# Patient Record
Sex: FEMALE | Race: BLACK OR AFRICAN AMERICAN | Employment: FULL TIME | URBAN - METROPOLITAN AREA
[De-identification: names, ages, dates, MRNs, and addresses within clinical notes are randomized per-mention and may not be internally consistent; named-entity substitution may affect disease eponyms.]

---

## 2017-02-10 DIAGNOSIS — F98.8 ADD (ATTENTION DEFICIT DISORDER): ICD-10-CM

## 2017-02-14 RX ORDER — DEXTROAMPHETAMINE SACCHARATE, AMPHETAMINE ASPARTATE, DEXTROAMPHETAMINE SULFATE AND AMPHETAMINE SULFATE 2.5; 2.5; 2.5; 2.5 MG/1; MG/1; MG/1; MG/1
TABLET ORAL
Qty: 60 TAB | Refills: 0 | Status: SHIPPED | OUTPATIENT
Start: 2017-02-14 | End: 2017-04-06 | Stop reason: SDUPTHER

## 2017-02-15 ENCOUNTER — TELEPHONE (OUTPATIENT)
Dept: FAMILY MEDICINE CLINIC | Age: 42
End: 2017-02-15

## 2017-02-15 NOTE — TELEPHONE ENCOUNTER
Called patient at [phone] (home)  (non-secure line) and unable to leave a message due to caller was unavailable at this time as per Guzman Araya. Patient needs to be informed that medication order Adderall is ready for .

## 2017-04-06 ENCOUNTER — OFFICE VISIT (OUTPATIENT)
Dept: FAMILY MEDICINE CLINIC | Age: 42
End: 2017-04-06

## 2017-04-06 VITALS
SYSTOLIC BLOOD PRESSURE: 140 MMHG | TEMPERATURE: 98.3 F | DIASTOLIC BLOOD PRESSURE: 80 MMHG | OXYGEN SATURATION: 98 % | HEART RATE: 110 BPM | RESPIRATION RATE: 16 BRPM | WEIGHT: 217 LBS | HEIGHT: 66 IN | BODY MASS INDEX: 34.87 KG/M2

## 2017-04-06 DIAGNOSIS — F98.8 ADD (ATTENTION DEFICIT DISORDER): ICD-10-CM

## 2017-04-06 DIAGNOSIS — E11.9 DIABETES MELLITUS WITHOUT COMPLICATION (HCC): ICD-10-CM

## 2017-04-06 DIAGNOSIS — F90.0 ATTENTION DEFICIT HYPERACTIVITY DISORDER (ADHD), PREDOMINANTLY INATTENTIVE TYPE: ICD-10-CM

## 2017-04-06 DIAGNOSIS — E11.9 DIABETES MELLITUS WITHOUT COMPLICATION (HCC): Primary | ICD-10-CM

## 2017-04-06 RX ORDER — SIMVASTATIN 10 MG/1
10 TABLET, FILM COATED ORAL
Qty: 30 TAB | Refills: 6 | Status: SHIPPED | OUTPATIENT
Start: 2017-04-06 | End: 2018-01-22 | Stop reason: SDUPTHER

## 2017-04-06 RX ORDER — DEXTROAMPHETAMINE SACCHARATE, AMPHETAMINE ASPARTATE, DEXTROAMPHETAMINE SULFATE AND AMPHETAMINE SULFATE 5; 5; 5; 5 MG/1; MG/1; MG/1; MG/1
TABLET ORAL
Qty: 60 TAB | Refills: 0 | Status: SHIPPED | OUTPATIENT
Start: 2017-04-06 | End: 2017-08-09 | Stop reason: SDUPTHER

## 2017-04-06 NOTE — PATIENT INSTRUCTIONS
Attention Deficit Hyperactivity Disorder (ADHD) in Adults: Care Instructions  Your Care Instructions  Attention deficit hyperactivity disorder, or ADHD, is a condition that makes it hard to pay attention. So you may have problems when you try to focus, get organized, and finish tasks. It might make you more active than other people. Or you might do things without thinking first.  ADHD is very common. It usually starts in early childhood. Many adults don't realize they have it until their children are diagnosed. Then they become aware of their own symptoms. Doctors don't know what causes ADHD. But it often runs in families. ADHD can be treated with medicines, behavior training, and counseling. Treatment can improve your life. Follow-up care is a key part of your treatment and safety. Be sure to make and go to all appointments, and call your doctor if you are having problems. It's also a good idea to know your test results and keep a list of the medicines you take. How can you care for yourself at home? · Learn all you can about ADHD. This will help you and your family understand it better. · Take your medicines exactly as prescribed. Call your doctor if you think you are having a problem with your medicine. You will get more details on the specific medicines your doctor prescribes. · If you miss a dose of your medicine, do not take an extra dose. · If your doctor suggests counseling, find a counselor you like and trust. Talk openly and honestly. Be willing to make some changes. · Find a support group for adults with ADHD. Talking to others with the same problems can help you feel better. It can also give you ideas about how to best cope with the condition. · Get rid of distractions at your work space. Keep your desk clean. Try not to face a window or busy hallway. · Use files, planners, and other tools to keep you organized. · Limit use of alcohol, and do not use illegal drugs.  People with ADHD tend to become addicted more easily than others. Tell your doctor if you need help to quit. Counseling, support groups, and sometimes medicines can help you stay free of alcohol or drugs. · Get at least 30 minutes of physical activity on most days of the week. Exercise has been shown to help people cope with ADHD. Walking is a good choice. You also may want to do other activities, such as running, swimming, cycling, or playing tennis or team sports. When should you call for help? Watch closely for changes in your health, and be sure to contact your doctor if:  · You feel sad a lot or cry all the time. · You have trouble sleeping, or you sleep too much. · You find it hard to concentrate, make decisions, or remember things. · You change how you normally eat. · You feel guilty for no reason. Where can you learn more? Go to http://jakob-niko.info/. Enter B196 in the search box to learn more about \"Attention Deficit Hyperactivity Disorder (ADHD) in Adults: Care Instructions. \"  Current as of: July 26, 2016  Content Version: 11.2  © 1438-9185 [employer]. Care instructions adapted under license by [employer] (which disclaims liability or warranty for this information). If you have questions about a medical condition or this instruction, always ask your healthcare professional. [address] any warranty or liability for your use of this information.

## 2017-04-06 NOTE — PROGRESS NOTES
1. Have you been to the ER, urgent care clinic since your last visit? Hospitalized since your last visit? No    2. Have you seen or consulted any other health care providers outside of the [facility] since your last visit? Include any pap smears or colon screening.  Yes Where: [facility] - gynecology

## 2017-04-06 NOTE — PROGRESS NOTES
HISTORY OF PRESENT ILLNESS  Tarah Coombs is a 37 y.o. female. HPI  Patient is here today for evaluation and treatment of: Diabetes    Diabetes:   States that recently she has had some elevated blood sugars; She inquires about if crestor could be the cause. She stopped the crestor and her blood sugars in the a.m. improved; She  She has been out her [place] [facility] [facility] [place] fro the last two weeks. She needs a refill. She also needs to see ophthalmology and needs  Diabetic foot exam and urine microalbumin. Plans to start exercising. She continues on glipizide. She thinks her adderall is not as effective as before; She has difficulty focussing and concentrating. Reports being at work and having difficulty concentrating at work. PMH,  Meds, Allergies, Family History, Social history reviewed        Current Outpatient Prescriptions:     dextroamphetamine-amphetamine (ADDERALL) 10 mg tablet, Earliest Fill Date: 2/14/17. Take 1 tab every morning. May take 1 tab between 12noon-3pm, Disp: 60 Tab, Rfl: 0    glipiZIDE (GLUCOTROL) 10 mg tablet, TAKE 1 TABLET BY MOUTH TWICE DAILY, Disp: 60 Tab, Rfl: 6    ergocalciferol (VITAMIN D2) 50,000 unit capsule, Take 1 Cap by mouth every seven (7) days. , Disp: 12 Cap, Rfl: 1    losartan (COZAAR) 50 mg tablet, Take 1 Tab by mouth daily. , Disp: 30 Tab, Rfl: 6    sitaGLIPtin (JANUVIA) 100 mg tablet, Take 1 Tab by mouth daily. , Disp: 30 Tab, Rfl: 5    Lancets (ONE TOUCH DELICA) misc, To use with one touch mini delica device. Check glucose 3 times daily, Disp: 1 Package, Rfl: 11    hydrochlorothiazide (HYDRODIURIL) 25 mg tablet, Take 1 Tab by mouth daily as needed. , Disp: 90 Tab, Rfl: 3    butalbital-acetaminophen-caffeine (FIORICET, ESGIC) -40 mg per tablet, 1-2 tabs every 4-6 hours as needed for headaches not to exceed 6 tabs in 24 hours, Disp: 60 Tab, Rfl: 0    glucose blood VI test strips (BLOOD GLUCOSE TEST) strip, To use with freestyle insulinx meter, Disp: 100 Strip, Rfl: 11    rosuvastatin (CRESTOR) 10 mg tablet, Take 1 Tab by mouth nightly., Disp: 30 Tab, Rfl: 5        Review of Systems   Constitutional: Negative for chills and fever. Cardiovascular: Negative for chest pain, claudication and leg swelling. Genitourinary: Negative for dysuria and urgency. Physical Exam   Visit Vitals    /80    Pulse (!) 110    Temp 98.3 °F (36.8 °C) (Oral)    Resp 16    Ht 5' 6\" (1.676 m)    Wt 217 lb (98.4 kg)    LMP 01/01/2007    SpO2 98%    BMI 35.02 kg/m2   General appearance: alert, cooperative, no distress, appears stated age  Neck: supple, symmetrical, trachea midline, no adenopathy, thyroid: not enlarged, symmetric, no tenderness/mass/nodules, no carotid bruit and no JVD  Lungs: clear to auscultation bilaterally  Heart: regular rate and rhythm, S1, S2 normal, no murmur, click, rub or gallop  Abdomen: soft, non-tender. Bowel sounds normal. No masses,  no organomegaly  Extremities: extremities normal, atraumatic, no cyanosis or edema   Sensory exam of the foot is normal, tested with the monofilament. Good pulses, no lesions or ulcers, good peripheral pulses. Lab Results   Component Value Date/Time    Cholesterol, total 238 12/14/2016 12:00 PM    HDL Cholesterol 41 12/14/2016 12:00 PM    LDL, calculated 163 12/14/2016 12:00 PM    VLDL, calculated 34 12/14/2016 12:00 PM    Triglyceride 171 12/14/2016 12:00 PM    CHOL/HDL Ratio 5.3 01/21/2016 09:00 AM     Lab Results   Component Value Date/Time    Hemoglobin A1c 12.4 12/14/2016 12:00 PM         ASSESSMENT and PLAN    ICD-10-CM ICD-9-CM    1. Diabetes mellitus without complication ([facility] [address]- not controlled E11.9 250.00 HEMOGLOBIN A1C WITH EAG      LIPID PANEL      METABOLIC PANEL, BASIC       DIABETES FOOT EXAM      MICROALBUMIN, UR, RAND W/ MICROALBUMIN/CREA RATIO      REFERRAL TO OPHTHALMOLOGY   2. Attention deficit hyperactivity disorder (ADHD), predominantly inattentive type- not controlled F90.0 314.00    3. ADD (attention deficit disorder) F98.8 314.00 dextroamphetamine-amphetamine (ADDERALL) 20 mg tablet     As above,    treatment plan as listed below  Orders Placed This Encounter    HEMOGLOBIN A1C WITH EAG    LIPID PANEL    METABOLIC PANEL, BASIC    MICROALBUMIN, UR, RAND W/ MICROALBUMIN/CREA RATIO    REFERRAL TO OPHTHALMOLOGY    HM DIABETES FOOT EXAM    SITagliptin (JANUVIA) 100 mg tablet    dextroamphetamine-amphetamine (ADDERALL) 20 mg tablet    simvastatin (ZOCOR) 10 mg tablet       above all stable unless otherwise noted  Increase adderall to 20 mg daily; may take another in afternoon as ordered  Refilled [employer]  Labs as ordered  Continue current meds as ordered  Follow-up Disposition:  Return in about 3 months (around 7/6/2017) for dm/adhd. An After Visit Summary was printed and given to the patient. This has been fully explained to the patient, who indicates understanding.

## 2017-04-06 NOTE — MR AVS SNAPSHOT
Visit Information Date & Time Provider Department Dept. Phone Encounter #  
 4/6/2017  6:00 PM Anjana Hoskins [facility] [phone] [id number] Follow-up Instructions Return in about 3 months (around 7/6/2017) for dm/adhd. Upcoming Health Maintenance Date Due  
 PAP AKA CERVICAL CYTOLOGY 7/29/2017 EYE EXAM RETINAL OR DILATED Q1 5/25/2017* HEMOGLOBIN A1C Q6M 6/14/2017 LIPID PANEL Q1 12/14/2017 FOOT EXAM Q1 4/6/2018 MICROALBUMIN Q1 4/6/2018 DTaP/Tdap/Td series (2 - Td) 1/1/2022 *Topic was postponed. The date shown is not the original due date. Allergies as of 4/6/2017  Review Complete On: 4/6/2017 By: Adan Rush LPN Severity Noted Reaction Type Reactions Lisinopril  12/29/2016    Cough Current Immunizations  Never Reviewed Name Date Influenza Vaccine PF 10/14/2015 TB Skin Test (PPD) Intradermal 12/14/2016  9:05 AM, 9/30/2015, 9/22/2015, 3/11/2013 Not reviewed this visit You Were Diagnosed With   
  
 Codes Comments [facility]    -  Primary ICD-10-CM: E11.9 ICD-9-CM: 250.00 Attention deficit hyperactivity disorder (ADHD), predominantly inattentive type     ICD-10-CM: F90.0 ICD-9-CM: 314.00   
 ADD (attention deficit disorder)     ICD-10-CM: F98.8 ICD-9-CM: 314.00 Vitals BP Pulse Temp Resp Height(growth percentile) Weight(growth percentile) 140/80 (!) 110 98.3 °F (36.8 °C) (Oral) 16 5' 6\" (1.676 m) 217 lb (98.4 kg) LMP SpO2 BMI OB Status Smoking Status 01/01/2007 98% 35.02 kg/m2 Hysterectomy Former Smoker Vitals History BMI and BSA Data Body Mass Index Body Surface Area 35.02 kg/m 2 2.14 m 2 Preferred Pharmacy Pharmacy Name Phone [address], [address] [facility] [phone] Your Updated Medication List  
  
   
 This list is accurate as of: 4/6/17  6:42 PM.  Always use your most recent med list.  
  
  
  
  
 butalbital-acetaminophen-caffeine -40 mg per tablet Commonly known as:  FIORICET, ESGIC  
1-2 tabs every 4-6 hours as needed for headaches not to exceed 6 tabs in 24 hours  
  
 dextroamphetamine-amphetamine 20 mg tablet Commonly known as:  ADDERALL Take 1 tab every morning. May take 1 tab between 12noon-3pm  
  
 ergocalciferol 50,000 unit capsule Commonly known as:  VITAMIN D2 Take 1 Cap by mouth every seven (7) days. glipiZIDE 10 mg tablet Commonly known as:  GLUCOTROL  
TAKE 1 TABLET BY MOUTH TWICE DAILY  
  
 glucose blood VI test strips strip Commonly known as:  blood glucose test  
To use with freestyle insulinx meter  
  
 hydroCHLOROthiazide 25 mg tablet Commonly known as:  HYDRODIURIL Take 1 Tab by mouth daily as needed. Lancets Misc Commonly known as: One Touch Lorelee Waylon To use with one touch mini delica device. Check glucose 3 times daily  
  
 losartan 50 mg tablet Commonly known as:  COZAAR Take 1 Tab by mouth daily. simvastatin 10 mg tablet Commonly known as:  ZOCOR Take 1 Tab by mouth nightly. SITagliptin 100 mg tablet Commonly known as:  Chemo Haggis Take 1 Tab by mouth daily. Prescriptions Printed Refills  
 dextroamphetamine-amphetamine (ADDERALL) 20 mg tablet 0 Sig: Take 1 tab every morning. May take 1 tab between 12noon-3pm  
 Class: Print Prescriptions Sent to Pharmacy Refills SITagliptin (JANUVIA) 100 mg tablet 6 Sig: Take 1 Tab by mouth daily. Class: Normal  
 Pharmacy: [address] [place] [address] #: [phone] Route: Oral  
 simvastatin (ZOCOR) 10 mg tablet 6 Sig: Take 1 Tab by mouth nightly.   
 Class: Normal  
 Pharmacy: [place] [address] [address] [facility]  #: [phone] Route: Oral  
  
We Performed the Following  DIABETES FOOT EXAM [HM7 Custom] REFERRAL TO OPHTHALMOLOGY [REF57 Custom] Comments:  
 Please evaluate patient for diabetic eye exam  
  
Follow-up Instructions Return in about 3 months (around 7/6/2017) for dm/adhd. To-Do List   
 04/06/2017 Lab:  HEMOGLOBIN A1C WITH EAG   
  
 04/06/2017 Lab:  LIPID PANEL   
  
 04/06/2017 Lab:  METABOLIC PANEL, BASIC   
  
 04/06/2017 Lab:  MICROALBUMIN, UR, RAND W/ MICROALBUMIN/CREA RATIO Referral Information Referral ID Referred By Referred To  
  
 [id number] [address], [address] Phone: [phone] Fax: [phone] Visits Status Start Date End Date 1 New Request 4/6/17 4/6/18 If your referral has a status of pending review or denied, additional information will be sent to support the outcome of this decision. Patient Instructions Attention Deficit Hyperactivity Disorder (ADHD) in Adults: Care Instructions Your Care Instructions Attention deficit hyperactivity disorder, or ADHD, is a condition that makes it hard to pay attention. So you may have problems when you try to focus, get organized, and finish tasks. It might make you more active than other people. Or you might do things without thinking first. 
ADHD is very common. It usually starts in early childhood. Many adults don't realize they have it until their children are diagnosed. Then they become aware of their own symptoms. Doctors don't know what causes ADHD. But it often runs in families. ADHD can be treated with medicines, behavior training, and counseling. Treatment can improve your life. Follow-up care is a key part of your treatment and safety.  Be sure to make and go to all appointments, and call your doctor if you are having problems. It's also a good idea to know your test results and keep a list of the medicines you take. How can you care for yourself at home? · Learn all you can about ADHD. This will help you and your family understand it better. · Take your medicines exactly as prescribed. Call your doctor if you think you are having a problem with your medicine. You will get more details on the specific medicines your doctor prescribes. · If you miss a dose of your medicine, do not take an extra dose. · If your doctor suggests counseling, find a counselor you like and trust. Talk openly and honestly. Be willing to make some changes. · Find a support group for adults with ADHD. Talking to others with the same problems can help you feel better. It can also give you ideas about how to best cope with the condition. · Get rid of distractions at your work space. Keep your desk clean. Try not to face a window or busy hallway. · Use files, planners, and other tools to keep you organized. · Limit use of alcohol, and do not use illegal drugs. People with ADHD tend to become addicted more easily than others. Tell your doctor if you need help to quit. Counseling, support groups, and sometimes medicines can help you stay free of alcohol or drugs. · Get at least 30 minutes of physical activity on most days of the week. Exercise has been shown to help people cope with ADHD. Walking is a good choice. You also may want to do other activities, such as running, swimming, cycling, or playing tennis or team sports. When should you call for help? Watch closely for changes in your health, and be sure to contact your doctor if: 
· You feel sad a lot or cry all the time. · You have trouble sleeping, or you sleep too much. · You find it hard to concentrate, make decisions, or remember things. · You change how you normally eat. · You feel guilty for no reason. Where can you learn more? Go to http://jakob-niko.info/. Enter B196 in the search box to learn more about \"Attention Deficit Hyperactivity Disorder (ADHD) in Adults: Care Instructions. \" Current as of: July 26, 2016 Content Version: 11.2 © 0645-1510 [employer]. Care instructions adapted under license by [employer] (which disclaims liability or warranty for this information). If you have questions about a medical condition or this instruction, always ask your healthcare professional. [place] [address] any warranty or liability for your use of this information. Introducing [address]! Dear Rosario Gunderson: Thank you for requesting a Zaplox account. Our records indicate that you already have an active Zaplox account. You can access your account anytime at [email or web address] Did you know that you can access your hospital and ER discharge instructions at any time in Zaplox? You can also review all of your test results from your hospital stay or ER visit. Additional Information If you have questions, please visit the Frequently Asked Questions section of the Zaplox website at [email or web address] Remember, Zaplox is NOT to be used for urgent needs. For medical emergencies, dial 911. Now available from your iPhone and Android! Please provide this summary of care documentation to your next provider. Your primary care clinician is listed as [address]. If you have any questions after today's visit, please call [phone].

## 2017-04-08 LAB
CREATININE, URINE: 42 MG/DL
MICROALB/CREAT RATIO, 140286: 2294.3 MCG/MG OF CREATININE (ref 0–30)
MICROALBUMIN,URINE RANDOM 140054: 963.6 UG/ML (ref 0.1–17)

## 2017-04-15 ENCOUNTER — HOSPITAL ENCOUNTER (OUTPATIENT)
Dept: LAB | Age: 42
Discharge: HOME | End: 2017-04-15

## 2017-04-15 LAB
ANION GAP SERPL CALC-SCNC: 15 MMOL/L
AVG GLU, 10930: 315 MG/DL (ref 91–123)
BUN SERPL-MCNC: 13 MG/DL (ref 6–22)
CALCIUM SERPL-MCNC: 9.5 MG/DL (ref 8.4–10.5)
CHLORIDE SERPL-SCNC: 100 MMOL/L (ref 98–110)
CHOLEST SERPL-MCNC: 219 MG/DL (ref 110–200)
CO2 SERPL-SCNC: 24 MMOL/L (ref 20–32)
CREAT SERPL-MCNC: 0.4 MG/DL (ref 0.5–1.2)
GFRAA, 66117: >60
GFRNA, 66118: >60
GLUCOSE SERPL-MCNC: 200 MG/DL (ref 65–99)
HBA1C MFR BLD HPLC: 12.6 % (ref 4.8–5.9)
HDLC SERPL-MCNC: 37 MG/DL (ref 40–59)
LDLC SERPL CALC-MCNC: 155 MG/DL (ref 50–99)
POTASSIUM SERPL-SCNC: 4.4 MMOL/L (ref 3.5–5.5)
SENTARA SPECIMEN COL,SENBCF: NORMAL
SODIUM SERPL-SCNC: 139 MMOL/L (ref 133–145)
TRIGL SERPL-MCNC: 138 MG/DL (ref 40–149)
VLDLC SERPL CALC-MCNC: 28 MG/DL (ref 8–30)

## 2017-04-15 PROCEDURE — 99001 SPECIMEN HANDLING PT-LAB: CPT | Performed by: FAMILY MEDICINE

## 2017-04-20 ENCOUNTER — OFFICE VISIT (OUTPATIENT)
Dept: FAMILY MEDICINE CLINIC | Age: 42
End: 2017-04-20

## 2017-04-20 VITALS
TEMPERATURE: 98.6 F | OXYGEN SATURATION: 98 % | HEART RATE: 92 BPM | WEIGHT: 217 LBS | RESPIRATION RATE: 16 BRPM | HEIGHT: 66 IN | SYSTOLIC BLOOD PRESSURE: 130 MMHG | DIASTOLIC BLOOD PRESSURE: 82 MMHG | BODY MASS INDEX: 34.87 KG/M2

## 2017-04-20 DIAGNOSIS — E11.9 DIABETES MELLITUS WITHOUT COMPLICATION (HCC): Primary | ICD-10-CM

## 2017-04-20 DIAGNOSIS — G43.909 MIGRAINE WITHOUT STATUS MIGRAINOSUS, NOT INTRACTABLE, UNSPECIFIED MIGRAINE TYPE: ICD-10-CM

## 2017-04-20 RX ORDER — LANCETS
EACH MISCELLANEOUS
Qty: 200 EACH | Refills: 11 | Status: SHIPPED | OUTPATIENT
Start: 2017-04-20 | End: 2018-12-11 | Stop reason: SDUPTHER

## 2017-04-20 RX ORDER — INSULIN DEGLUDEC 200 U/ML
10 INJECTION, SOLUTION SUBCUTANEOUS DAILY
Qty: 1 ADJUSTABLE DOSE PRE-FILLED PEN SYRINGE | Refills: 3 | Status: SHIPPED | OUTPATIENT
Start: 2017-04-20 | End: 2019-02-28 | Stop reason: SDUPTHER

## 2017-04-20 RX ORDER — BUTALBITAL, ACETAMINOPHEN AND CAFFEINE 50; 325; 40 MG/1; MG/1; MG/1
1 TABLET ORAL
Qty: 60 TAB | Refills: 0 | Status: SHIPPED | OUTPATIENT
Start: 2017-04-20 | End: 2019-02-03 | Stop reason: SDUPTHER

## 2017-04-20 NOTE — PROGRESS NOTES
HISTORY OF PRESENT ILLNESS  Heide Álvarez is a 40 y.o. female. HPI  Patient is here today for follow up on: Abnormal Lab Results    Results: lowest blood sugars have been in the 160's and the highest blood sugar was 194. This is better since resuming Januvia. She has already been on bydureon, trulicity and victoza. She has been unable to tolerate  Metformin. Diarrhea debilitated her with respect to having diarrhea. She is hear to develop a treatment plan for her uncontrolled diabetes. Pt also has a h/o migraines for which she takes fioricet. Fioricet is helpful and patients needs a refill on med. PMH,  Meds, Allergies, Family History, Social history reviewed        Current Outpatient Prescriptions:     SITagliptin (JANUVIA) 100 mg tablet, Take 1 Tab by mouth daily. , Disp: 30 Tab, Rfl: 6    dextroamphetamine-amphetamine (ADDERALL) 20 mg tablet, Take 1 tab every morning. May take 1 tab between 12noon-3pm, Disp: 60 Tab, Rfl: 0    simvastatin (ZOCOR) 10 mg tablet, Take 1 Tab by mouth nightly., Disp: 30 Tab, Rfl: 6    glipiZIDE (GLUCOTROL) 10 mg tablet, TAKE 1 TABLET BY MOUTH TWICE DAILY, Disp: 60 Tab, Rfl: 6    ergocalciferol (VITAMIN D2) 50,000 unit capsule, Take 1 Cap by mouth every seven (7) days. , Disp: 12 Cap, Rfl: 1    losartan (COZAAR) 50 mg tablet, Take 1 Tab by mouth daily. , Disp: 30 Tab, Rfl: 6    Lancets (ONE TOUCH DELICA) misc, To use with one touch mini delica device. Check glucose 3 times daily, Disp: 1 Package, Rfl: 11    hydrochlorothiazide (HYDRODIURIL) 25 mg tablet, Take 1 Tab by mouth daily as needed. , Disp: 90 Tab, Rfl: 3    butalbital-acetaminophen-caffeine (FIORICET, ESGIC) -40 mg per tablet, 1-2 tabs every 4-6 hours as needed for headaches not to exceed 6 tabs in 24 hours, Disp: 60 Tab, Rfl: 0    glucose blood VI test strips (BLOOD GLUCOSE TEST) strip, To use with freestyle insulinx meter, Disp: 100 Strip, Rfl: 11      PMH,  Meds, Allergies, Family History, Social history reviewed    Review of Systems   Constitutional: Negative for chills and fever. Cardiovascular: Negative for chest pain and palpitations. Physical Exam   Constitutional: She appears well-developed and well-nourished. No distress. Cardiovascular: Normal rate. Exam reveals no gallop and no friction rub. No murmur heard. Pulmonary/Chest: Breath sounds normal. No respiratory distress. She has no wheezes. She has no rales. Musculoskeletal: She exhibits no edema. Nursing note and vitals reviewed. Lab Results   Component Value Date/Time    Hemoglobin A1c 12.6 04/15/2017 09:15 AM     Lab Results   Component Value Date/Time    Glucose 200 04/15/2017 09:15 AM    Glucose (POC) 240 08/22/2014 09:38 AM     Lab Results   Component Value Date/Time    Sodium 139 04/15/2017 09:15 AM    Potassium 4.4 04/15/2017 09:15 AM    Chloride 100 04/15/2017 09:15 AM    CO2 24 04/15/2017 09:15 AM    Anion gap 15.0 04/15/2017 09:15 AM    Glucose 200 04/15/2017 09:15 AM    BUN 13 04/15/2017 09:15 AM    Creatinine 0.4 04/15/2017 09:15 AM    BUN/Creatinine ratio 19 01/21/2016 09:00 AM    GFR est AA >60 01/21/2016 09:00 AM    GFR est non-AA >60 01/21/2016 09:00 AM    Calcium 9.5 04/15/2017 09:15 AM       ASSESSMENT and PLAN    ICD-10-CM ICD-9-CM    1. Diabetes mellitus without complication (HCC) K07.3 250.00    2.  Migraine without status migrainosus, not intractable, unspecified migraine type G43.909 346.90 butalbital-acetaminophen-caffeine (FIORICET, ESGIC) -40 mg per tablet       As above, #1 not controlled  # 2 stable   treatment plan as listed below  Orders Placed This Encounter    insulin degludec (TRESIBA FLEXTOUCH U-200) 200 unit/mL (3 mL) inpn    Lancets misc    butalbital-acetaminophen-caffeine (FIORICET, ESGIC) -40 mg per tablet     Start tresiba as ordered  Refilled fioricet  Strict diabetic diet advised, risks reviewed  Follow-up Disposition:  Return in about 2 months (around 6/20/2017) for dm. An After Visit Summary was printed and given to the patient. This has been fully explained to the patient, who indicates understanding.

## 2017-04-20 NOTE — PATIENT INSTRUCTIONS

## 2017-04-20 NOTE — PROGRESS NOTES
1. Have you been to the ER, urgent care clinic since your last visit? Hospitalized since your last visit? No    2. Have you seen or consulted any other health care providers outside of the [address] [place] [address] since your last visit? Include any pap smears or colon screening.  No

## 2017-04-20 NOTE — MR AVS SNAPSHOT
Visit Information Date & Time Provider Department Dept. Phone Encounter #  
 4/20/2017  6:40 PM Lou Fink, [address] [phone] [id number] Follow-up Instructions Return in about 2 months (around 6/20/2017) for dm. Upcoming Health Maintenance Date Due  
 PAP AKA CERVICAL CYTOLOGY 7/29/2017 EYE EXAM RETINAL OR DILATED Q1 5/25/2017* HEMOGLOBIN A1C Q6M 10/15/2017 FOOT EXAM Q1 4/6/2018 MICROALBUMIN Q1 4/7/2018 LIPID PANEL Q1 4/15/2018 DTaP/Tdap/Td series (2 - Td) 1/1/2022 *Topic was postponed. The date shown is not the original due date. Allergies as of 4/20/2017  Review Complete On: 4/20/2017 By: Lou Fink MD  
  
 Severity Noted Reaction Type Reactions Lisinopril  12/29/2016    Cough Current Immunizations  Never Reviewed Name Date Influenza Vaccine PF 10/14/2015 TB Skin Test (PPD) Intradermal 12/14/2016  9:05 AM, 9/30/2015, 9/22/2015, 3/11/2013 Not reviewed this visit You Were Diagnosed With   
  
 Codes Comments [facility]    -  Primary ICD-10-CM: E11.9 ICD-9-CM: 250.00 Migraine without status migrainosus, not intractable, unspecified migraine type     ICD-10-CM: G43.909 ICD-9-CM: 346.90 Vitals BP Pulse Temp Resp Height(growth percentile) Weight(growth percentile) 130/82 92 98.6 °F (37 °C) (Oral) 16 5' 6\" (1.676 m) 217 lb (98.4 kg) LMP SpO2 BMI OB Status Smoking Status 01/01/2007 98% 35.02 kg/m2 Hysterectomy Former Smoker Vitals History BMI and BSA Data Body Mass Index Body Surface Area 35.02 kg/m 2 2.14 m 2 Preferred Pharmacy Pharmacy Name Phone [address], [address] [facility] [phone]  [place] [facility] [phone] Your Updated Medication List  
  
   
This list is accurate as of: 4/20/17  7:39 PM.  Always use your most recent med list.  
  
  
  
  
 butalbital-acetaminophen-caffeine -40 mg per tablet Commonly known as:  Stacey Ades Take 1 Tab by mouth every six (6) hours as needed for Pain. Max Daily Amount: 4 Tabs. dextroamphetamine-amphetamine 20 mg tablet Commonly known as:  ADDERALL Take 1 tab every morning. May take 1 tab between 12noon-3pm  
  
 ergocalciferol 50,000 unit capsule Commonly known as:  VITAMIN D2 Take 1 Cap by mouth every seven (7) days. glipiZIDE 10 mg tablet Commonly known as:  GLUCOTROL  
TAKE 1 TABLET BY MOUTH TWICE DAILY  
  
 glucose blood VI test strips strip Commonly known as:  blood glucose test  
To use with freestyle insulinx meter  
  
 hydroCHLOROthiazide 25 mg tablet Commonly known as:  HYDRODIURIL Take 1 Tab by mouth daily as needed. insulin degludec 200 unit/mL (3 mL) Inpn Commonly known as:  TRESIBA FLEXTOUCH U-200  
10 Units by SubCUTAneous route daily. * Lancets Misc Commonly known as: One Touch Verlan Sea To use with one touch mini delica device. Check glucose 3 times daily * Lancets Misc Blood sugar check TID  
  
 losartan 50 mg tablet Commonly known as:  COZAAR Take 1 Tab by mouth daily. simvastatin 10 mg tablet Commonly known as:  ZOCOR Take 1 Tab by mouth nightly. SITagliptin 100 mg tablet Commonly known as:  Yaritza Abhijeet Take 1 Tab by mouth daily. * Notice: This list has 2 medication(s) that are the same as other medications prescribed for you. Read the directions carefully, and ask your doctor or other care provider to review them with you. Prescriptions Printed Refills  
 butalbital-acetaminophen-caffeine (FIORICET, ESGIC) -40 mg per tablet 0 Sig: Take 1 Tab by mouth every six (6) hours as needed for Pain. Max Daily Amount: 4 Tabs. Class: Print Route: Oral  
  
Prescriptions Sent to Pharmacy  Refills  
 insulin degludec (TRESIBA FLEXTOUCH U-200) 200 unit/mL (3 mL) inpn 3  
 Sig: 10 Units by SubCUTAneous route daily. Class: Normal  
 Pharmacy: [address] [place] [address] [facility] Ph #: [phone] Route: SubCUTAneous Lancets misc 11 Sig: Blood sugar check TID Class: Normal  
 Pharmacy: [address] Ph #: [phone] Follow-up Instructions Return in about 2 months (around 6/20/2017) for dm. Patient Instructions Learning About Meal Planning for Diabetes Why plan your meals? Meal planning can be a key part of managing diabetes. Planning meals and snacks with the right balance of carbohydrate, protein, and fat can help you keep your blood sugar at the target level you set with your doctor. You don't have to eat special foods. You can eat what your family eats, including sweets once in a while. But you do have to pay attention to how often you eat and how much you eat of certain foods. You may want to work with a dietitian or a certified diabetes educator. He or she can give you tips and meal ideas and can answer your questions about meal planning. This health professional can also help you reach a healthy weight if that is one of your goals. What plan is right for you? Your dietitian or diabetes educator may suggest that you start with the plate format or carbohydrate counting. The plate format The plate format is a simple way to help you manage how you eat. You plan meals by learning how much space each food should take on a plate. Using the plate format helps you spread carbohydrate throughout the day. It can make it easier to keep your blood sugar level within your target range. It also helps you see if you're eating healthy portion sizes. To use the plate format, you put non-starchy vegetables on half your plate. Add meat or meat substitutes on one-quarter of the plate.  Put a grain or starchy vegetable (such as brown rice or a potato) on the final quarter of the plate. You can add a small piece of fruit and some low-fat or fat-free milk or yogurt, depending on your carbohydrate goal for each meal. 
Here are some tips for using the plate format: · Make sure that you are not using an oversized plate. A 9-inch plate is best. Many restaurants use larger plates. · Get used to using the plate format at home. Then you can use it when you eat out. · Write down your questions about using the plate format. Talk to your doctor, a dietitian, or a diabetes educator about your concerns. Carbohydrate counting With carbohydrate counting, you plan meals based on the amount of carbohydrate in each food. Carbohydrate raises blood sugar higher and more quickly than any other nutrient. It is found in desserts, breads and cereals, and fruit. It's also found in starchy vegetables such as potatoes and corn, grains such as rice and pasta, and milk and yogurt. Spreading carbohydrate throughout the day helps keep your blood sugar levels within your target range. Your daily amount depends on several things, including your weight, how active you are, which diabetes medicines you take, and what your goals are for your blood sugar levels. A registered dietitian or diabetes educator can help you plan how much carbohydrate to include in each meal and snack. A guideline for your daily amount of carbohydrate is: · 45 to 60 grams at each meal. That's about the same as 3 to 4 carbohydrate servings. · 15 to 20 grams at each snack. That's about the same as 1 carbohydrate serving. The Nutrition Facts label on packaged foods tells you how much carbohydrate is in a serving of the food. First, look at the serving size on the food label. Is that the amount you eat in a serving? All of the nutrition information on a food label is based on that serving size.  So if you eat more or less than that, you'll need to adjust the other numbers. Total carbohydrate is the next thing you need to look for on the label. If you count carbohydrate servings, one serving of carbohydrate is 15 grams. For foods that don't come with labels, such as fresh fruits and vegetables, you'll need a guide that lists carbohydrate in these foods. Ask your doctor, dietitian, or diabetes educator about books or other nutrition guides you can use. If you take insulin, you need to know how many grams of carbohydrate are in a meal. This lets you know how much rapid-acting insulin to take before you eat. If you use an insulin pump, you get a constant rate of insulin during the day. So the pump must be programmed at meals to give you extra insulin to cover the rise in blood sugar after meals. When you know how much carbohydrate you will eat, you can take the right amount of insulin. Or, if you always use the same amount of insulin, you need to make sure that you eat the same amount of carbohydrate at meals. If you need more help to understand carbohydrate counting and food labels, ask your doctor, dietitian, or diabetes educator. How do you get started with meal planning? Here are some tips to get started: 
· Plan your meals a week at a time. Don't forget to include snacks too. · Use cookbooks or online recipes to plan several main meals. Plan some quick meals for busy nights. You also can double some recipes that freeze well. Then you can save half for other busy nights when you don't have time to cook. · Make sure you have the ingredients you need for your recipes. If you're running low on basic items, put these items on your shopping list too. · List foods that you use to make breakfasts, lunches, and snacks. List plenty of fruits and vegetables. · Post this list on the refrigerator. Add to it as you think of more things you need. · Take the list to the store to do your weekly shopping. Follow-up care is a key part of your treatment and safety. Be sure to make and go to all appointments, and call your doctor if you are having problems. It's also a good idea to know your test results and keep a list of the medicines you take. Where can you learn more? Go to http://jakob-niko.info/. Viet Valdez in the search box to learn more about \"Learning About Meal Planning for Diabetes. \" Current as of: October 26, 2016 Content Version: 11.2 © 7339-1644 [employer]. Care instructions adapted under license by [employer] (which disclaims liability or warranty for this information). If you have questions about a medical condition or this instruction, always ask your healthcare professional. [address] any warranty or liability for your use of this information. Introducing [facility] & HEALTH SERVICES! Dear Shanta Antunez: Thank you for requesting a Sprio account. Our records indicate that you already have an active Sprio account. You can access your account anytime at [email or web address] Did you know that you can access your hospital and ER discharge instructions at any time in Sprio? You can also review all of your test results from your hospital stay or ER visit. Additional Information If you have questions, please visit the Frequently Asked Questions section of the Sprio website at [email or web address] Remember, Sprio is NOT to be used for urgent needs. For medical emergencies, dial 911. Now available from your iPhone and Android! Please provide this summary of care documentation to your next provider. Your primary care clinician is listed as [address]. If you have any questions after today's visit, please call [phone].

## 2017-04-25 RX ORDER — PEN NEEDLE, DIABETIC 31 GX3/16"
NEEDLE, DISPOSABLE MISCELLANEOUS
Qty: 100 PEN NEEDLE | Refills: 3 | Status: SHIPPED | OUTPATIENT
Start: 2017-04-25 | End: 2019-02-28 | Stop reason: SDUPTHER

## 2017-05-16 DIAGNOSIS — E55.9 HYPOVITAMINOSIS D: ICD-10-CM

## 2017-05-18 RX ORDER — ERGOCALCIFEROL 1.25 MG/1
CAPSULE ORAL
Qty: 12 CAP | Refills: 0 | Status: SHIPPED | OUTPATIENT
Start: 2017-05-18 | End: 2017-08-13 | Stop reason: SDUPTHER

## 2017-06-21 ENCOUNTER — OFFICE VISIT (OUTPATIENT)
Dept: FAMILY MEDICINE CLINIC | Age: 42
End: 2017-06-21

## 2017-06-21 VITALS
RESPIRATION RATE: 16 BRPM | HEART RATE: 103 BPM | DIASTOLIC BLOOD PRESSURE: 90 MMHG | WEIGHT: 207 LBS | SYSTOLIC BLOOD PRESSURE: 134 MMHG | TEMPERATURE: 98.2 F | OXYGEN SATURATION: 98 % | HEIGHT: 66 IN | BODY MASS INDEX: 33.27 KG/M2

## 2017-06-21 DIAGNOSIS — E55.9 VITAMIN D DEFICIENCY: ICD-10-CM

## 2017-06-21 DIAGNOSIS — E78.00 PURE HYPERCHOLESTEROLEMIA: ICD-10-CM

## 2017-06-21 DIAGNOSIS — E11.9 DIABETES MELLITUS WITHOUT COMPLICATION (HCC): Primary | ICD-10-CM

## 2017-06-21 LAB — HBA1C MFR BLD HPLC: 6.9 %

## 2017-06-21 NOTE — MR AVS SNAPSHOT
Visit Information Date & Time Provider Department Dept. Phone Encounter #  
 6/21/2017  1:00 PM Cody Ponce [facility] [phone] [id number] Follow-up Instructions Return in about 4 months (around 10/21/2017) for dm/htn. Upcoming Health Maintenance Date Due  
 EYE EXAM RETINAL OR DILATED Q1 4/1/2015 PAP AKA CERVICAL CYTOLOGY 7/29/2017 INFLUENZA AGE 9 TO ADULT 8/1/2017 HEMOGLOBIN A1C Q6M 10/15/2017 FOOT EXAM Q1 4/6/2018 MICROALBUMIN Q1 4/7/2018 LIPID PANEL Q1 4/15/2018 DTaP/Tdap/Td series (2 - Td) 1/1/2022 Allergies as of 6/21/2017  Review Complete On: 4/20/2017 By: Cody Ponce MD  
  
 Severity Noted Reaction Type Reactions Lisinopril  12/29/2016    Cough Current Immunizations  Never Reviewed Name Date Influenza Vaccine PF 10/14/2015 TB Skin Test (PPD) Intradermal 12/14/2016  9:05 AM, 9/30/2015, 9/22/2015, 3/11/2013 Not reviewed this visit You Were Diagnosed With   
  
 Codes Comments Diabetes mellitus without complication ([facility]    -  Primary ICD-10-CM: E11.9 ICD-9-CM: 250.00 Pure hypercholesterolemia     ICD-10-CM: E78.00 ICD-9-CM: 272.0 Vitamin D deficiency     ICD-10-CM: E55.9 ICD-9-CM: 268.9 Vitals BP Pulse Temp Resp Height(growth percentile) Weight(growth percentile) 134/90 (BP 1 Location: Right arm, BP Patient Position: Sitting) (!) 103 98.2 °F (36.8 °C) (Oral) 16 5' 6\" (1.676 m) 207 lb (93.9 kg) LMP SpO2 BMI OB Status Smoking Status 01/01/2007 98% 33.41 kg/m2 Hysterectomy Former Smoker BMI and BSA Data Body Mass Index Body Surface Area  
 33.41 kg/m 2 2.09 m 2 Preferred Pharmacy Pharmacy Name Phone [address], [address] [facility] [phone] [facility] [phone] Your Updated Medication List  
  
   
This list is accurate as of: 6/21/17  1:38 PM.  Always use your most recent med list.  
 butalbital-acetaminophen-caffeine -40 mg per tablet Commonly known as:  Ofilia Coahoma Take 1 Tab by mouth every six (6) hours as needed for Pain. Max Daily Amount: 4 Tabs. dextroamphetamine-amphetamine 20 mg tablet Commonly known as:  ADDERALL Take 1 tab every morning. May take 1 tab between 12noon-3pm  
  
 ergocalciferol 50,000 unit capsule Commonly known as:  ERGOCALCIFEROL  
TAKE 1 CAPSULE BY MOUTH EVERY 7 DAYS  
  
 glipiZIDE 10 mg tablet Commonly known as:  GLUCOTROL  
TAKE 1 TABLET BY MOUTH TWICE DAILY  
  
 glucose blood VI test strips strip Commonly known as:  blood glucose test  
To use with freestyle insulinx meter Check glucose three times daily. hydroCHLOROthiazide 25 mg tablet Commonly known as:  HYDRODIURIL Take 1 Tab by mouth daily as needed. insulin degludec 200 unit/mL (3 mL) Inpn Commonly known as:  TRESIBA FLEXTOUCH U-200  
10 Units by SubCUTAneous route daily. Insulin Needles (Disposable) 32 gauge x 5/32\" Ndle Commonly known as:  Miranda Pen Needle To be used once daily with Cre[employer] Circles Polite FlexTouch Pen. * Lancets Misc Commonly known as: One Touch Sandre Rumple To use with one touch mini delica device. Check glucose 3 times daily * Lancets Misc Blood sugar check TID  
  
 losartan 50 mg tablet Commonly known as:  COZAAR Take 1 Tab by mouth daily. simvastatin 10 mg tablet Commonly known as:  ZOCOR Take 1 Tab by mouth nightly. SITagliptin 100 mg tablet Commonly known as:  Sumit Starkeyer Take 1 Tab by mouth daily. * Notice: This list has 2 medication(s) that are the same as other medications prescribed for you. Read the directions carefully, and ask your doctor or other care provider to review them with you. We Performed the Following AMB POC HEMOGLOBIN A1C [29010 CPT(R)] Follow-up Instructions Return in about 4 months (around 10/21/2017) for dm/htn.   
  
To-Do List   
 06/28/2017 Lab:  ALT   
  
 06/28/2017 Lab:  AST   
  
 06/28/2017 Lab:  LIPID PANEL   
  
 06/28/2017 Lab:  METABOLIC PANEL, BASIC   
  
 06/28/2017 Lab:  MICROALBUMIN, UR, RAND W/ MICROALBUMIN/CREA RATIO   
  
 06/28/2017 Lab:  VITAMIN D, 25 HYDROXY Patient Instructions Learning About Diabetes Food Guidelines Your Care Instructions Meal planning is important to manage diabetes. It helps keep your blood sugar at a target level (which you set with your doctor). You don't have to eat special foods. You can eat what your family eats, including sweets once in a while. But you do have to pay attention to how often you eat and how much you eat of certain foods. You may want to work with a dietitian or a certified diabetes educator (CDE) to help you plan meals and snacks. A dietitian or CDE can also help you lose weight if that is one of your goals. What should you know about eating carbs? Managing the amount of carbohydrate (carbs) you eat is an important part of healthy meals when you have diabetes. Carbohydrate is found in many foods. · Learn which foods have carbs. And learn the amounts of carbs in different foods. ¨ Bread, cereal, pasta, and rice have about 15 grams of carbs in a serving. A serving is 1 slice of bread (1 ounce), ½ cup of cooked cereal, or 1/3 cup of cooked pasta or rice. ¨ Fruits have 15 grams of carbs in a serving. A serving is 1 small fresh fruit, such as an apple or orange; ½ of a banana; ½ cup of cooked or canned fruit; ½ cup of fruit juice; 1 cup of melon or raspberries; or 2 tablespoons of dried fruit. ¨ Milk and no-sugar-added yogurt have 15 grams of carbs in a serving. A serving is 1 cup of milk or 2/3 cup of no-sugar-added yogurt. ¨ Starchy vegetables have 15 grams of carbs in a serving.  A serving is ½ cup of mashed potatoes or sweet potato; 1 cup winter squash; ½ of a small baked potato; ½ cup of cooked beans; or ½ cup cooked corn or green peas. · Learn how much carbs to eat each day and at each meal. A dietitian or CDE can teach you how to keep track of the amount of carbs you eat. This is called carbohydrate counting. · If you are not sure how to count carbohydrate grams, use the Plate Method to plan meals. It is a good, quick way to make sure that you have a balanced meal. It also helps you spread carbs throughout the day. ¨ Divide your plate by types of foods. Put non-starchy vegetables on half the plate, meat or other protein food on one-quarter of the plate, and a grain or starchy vegetable in the final quarter of the plate. To this you can add a small piece of fruit and 1 cup of milk or yogurt, depending on how many carbs you are supposed to eat at a meal. 
· Try to eat about the same amount of carbs at each meal. Do not \"save up\" your daily allowance of carbs to eat at one meal. 
· Proteins have very little or no carbs per serving. Examples of proteins are beef, chicken, turkey, fish, eggs, tofu, cheese, cottage cheese, and peanut butter. A serving size of meat is 3 ounces, which is about the size of a deck of cards. Examples of meat substitute serving sizes (equal to 1 ounce of meat) are 1/4 cup of cottage cheese, 1 egg, 1 tablespoon of peanut butter, and ½ cup of tofu. How can you eat out and still eat healthy? · Learn to estimate the serving sizes of foods that have carbohydrate. If you measure food at home, it will be easier to estimate the amount in a serving of restaurant food. · If the meal you order has too much carbohydrate (such as potatoes, corn, or baked beans), ask to have a low-carbohydrate food instead. Ask for a salad or green vegetables. · If you use insulin, check your blood sugar before and after eating out to help you plan how much to eat in the future.  
· If you eat more carbohydrate at a meal than you had planned, take a walk or do other exercise. This will help lower your blood sugar. What else should you know? · Limit saturated fat, such as the fat from meat and dairy products. This is a healthy choice because people who have diabetes are at higher risk of heart disease. So choose lean cuts of meat and nonfat or low-fat dairy products. Use olive or canola oil instead of butter or shortening when cooking. · Don't skip meals. Your blood sugar may drop too low if you skip meals and take insulin or certain medicines for diabetes. · Check with your doctor before you drink alcohol. Alcohol can cause your blood sugar to drop too low. Alcohol can also cause a bad reaction if you take certain diabetes medicines. Follow-up care is a key part of your treatment and safety. Be sure to make and go to all appointments, and call your doctor if you are having problems. It's also a good idea to know your test results and keep a list of the medicines you take. Where can you learn more? Go to http://jakob-niko.info/. Enter J669 in the search box to learn more about \"Learning About Diabetes Food Guidelines. \" Current as of: March 13, 2017 Content Version: 11.3 © 0206-9804 [employer] Care instructions adapted under license by [employer] (which disclaims liability or warranty for this information). If you have questions about a medical condition or this instruction, always ask your healthcare professional. [address] any warranty or liability for your use of this information. Introducing [facility] & HEALTH SERVICES! Dear Thomos Sicard: Thank you for requesting a [employer] account. Our records indicate that you already have an active [employer] account. You can access your account anytime at [email or web address] Delivery [email or web address] ([email or web address]) Did you know that you can access your hospital and ER discharge instructions at any time in [employer]?   You can also review all of your test results from your hospital stay or ER visit. Additional Information If you have questions, please visit the Frequently Asked Questions section of the [employer] website at [email or web address] Remember, [employer] is NOT to be used for urgent needs. For medical emergencies, dial 911. Now available from your iPhone and Android! Please provide this summary of care documentation to your next provider. Your primary care clinician is listed as [address]. If you have any questions after today's visit, please call [phone].

## 2017-06-21 NOTE — PATIENT INSTRUCTIONS

## 2017-06-21 NOTE — PROGRESS NOTES
1. Have you been to the ER, urgent care clinic since your last visit? Hospitalized since your last visit? No    2. Have you seen or consulted any other health care providers outside of the [address] [place] [address] since your last visit? Include any pap smears or colon screening.  No

## 2017-06-21 NOTE — PROGRESS NOTES
HISTORY OF PRESENT ILLNESS  Lynn Lemus is a 40 y.o. female. HPI  Patient is here today for evaluation and treatment of: Diabetes    Diabetes:   Pt was started on tresiba at her last visit. She has tolerated med well; She did do a green smooth cleanse. She has lost 10 pounds since her last visit. Chio Ling Has not started exercising. Blood sugars have been in the 80s and 90s with the highest being around 132. She is not fasting. She feels much better than she previously had. Wt Readings from Last 3 Encounters:   06/21/17 207 lb (93.9 kg)   04/20/17 217 lb (98.4 kg)   04/06/17 217 lb (98.4 kg)     She has had replacement of her vitamin D. She is also due to have her cholesterol checked. She has no other complaints. She is on zocor for chol      Current Outpatient Prescriptions:     ergocalciferol (ERGOCALCIFEROL) 50,000 unit capsule, TAKE 1 CAPSULE BY MOUTH EVERY 7 DAYS, Disp: 12 Cap, Rfl: 0    Insulin Needles, Disposable, (REBEKAH PEN NEEDLE) 32 gauge x 5/32\" ndle, To be used once daily with Memonic FlexTouch Pen., Disp: 100 Pen Needle, Rfl: 3    glucose blood VI test strips (BLOOD GLUCOSE TEST) strip, To use with freestyle insulinx meter Check glucose three times daily. , Disp: 300 Strip, Rfl: 3    insulin degludec (TRESIBA FLEXTOUCH U-200) 200 unit/mL (3 mL) inpn, 10 Units by SubCUTAneous route daily. , Disp: 1 Adjustable Dose Pre-filled Pen Syringe, Rfl: 3    Lancets misc, Blood sugar check TID, Disp: 200 Each, Rfl: 11    butalbital-acetaminophen-caffeine (FIORICET, ESGIC) -40 mg per tablet, Take 1 Tab by mouth every six (6) hours as needed for Pain. Max Daily Amount: 4 Tabs., Disp: 60 Tab, Rfl: 0    SITagliptin (JANUVIA) 100 mg tablet, Take 1 Tab by mouth daily. , Disp: 30 Tab, Rfl: 6    dextroamphetamine-amphetamine (ADDERALL) 20 mg tablet, Take 1 tab every morning. May take 1 tab between 12noon-3pm, Disp: 60 Tab, Rfl: 0    simvastatin (ZOCOR) 10 mg tablet, Take 1 Tab by mouth nightly. , Disp: 30 Tab, Rfl: 6    glipiZIDE (GLUCOTROL) 10 mg tablet, TAKE 1 TABLET BY MOUTH TWICE DAILY, Disp: 60 Tab, Rfl: 6    losartan (COZAAR) 50 mg tablet, Take 1 Tab by mouth daily. , Disp: 30 Tab, Rfl: 6    Lancets (ONE TOUCH DELICA) misc, To use with one touch mini delica device. Check glucose 3 times daily, Disp: 1 Package, Rfl: 11    hydrochlorothiazide (HYDRODIURIL) 25 mg tablet, Take 1 Tab by mouth daily as needed. , Disp: 90 Tab, Rfl: 3        Review of Systems   Constitutional: Negative for chills and fever. Cardiovascular: Negative for chest pain and palpitations. Physical Exam   Constitutional: She appears well-developed and well-nourished. No distress. Neck: Neck supple. No thyromegaly present. Cardiovascular: Normal rate and regular rhythm. Exam reveals no gallop and no friction rub. No murmur heard. Pulmonary/Chest: Breath sounds normal. No respiratory distress. She has no wheezes. She has no rales. Musculoskeletal: She exhibits no edema. Nursing note and vitals reviewed. Visit Vitals    /90 (BP 1 Location: Right arm, BP Patient Position: Sitting)    Pulse (!) 103    Temp 98.2 °F (36.8 °C) (Oral)    Resp 16    Ht 5' 6\" (1.676 m)    Wt 207 lb (93.9 kg)    LMP 01/01/2007    SpO2 98%    BMI 33.41 kg/m2       POC A1c 6.9%  ASSESSMENT and PLAN    ICD-10-CM ICD-9-CM    1. Diabetes mellitus without complication ([facility] [address]- much improved E11.9 250.00 AMB POC HEMOGLOBIN Z2W      METABOLIC PANEL, BASIC      MICROALBUMIN, UR, RAND W/ MICROALBUMIN/CREA RATIO   2. Pure hypercholesterolemia- for lab orders only E78.00 272.0 LIPID PANEL      AST      ALT   3. Vitamin D deficiency- for lab orders only E55.9 268.9 VITAMIN D, 25 HYDROXY       As above,   above all stable unless otherwise noted  Labs as ordered  Continue current meds as ordered  Follow-up Disposition:  Return in about 4 months (around 10/21/2017) for dm/htn.   An After Visit Summary was printed and given to the patient. This has been fully explained to the patient, who indicates understanding.

## 2017-06-28 ENCOUNTER — HOSPITAL ENCOUNTER (OUTPATIENT)
Dept: MAMMOGRAPHY | Age: 42
Discharge: HOME | End: 2017-06-28
Attending: OBSTETRICS & GYNECOLOGY
Payer: COMMERCIAL

## 2017-06-28 DIAGNOSIS — E78.00 PURE HYPERCHOLESTEROLEMIA: ICD-10-CM

## 2017-06-28 DIAGNOSIS — E55.9 VITAMIN D DEFICIENCY: ICD-10-CM

## 2017-06-28 DIAGNOSIS — E11.9 DIABETES MELLITUS WITHOUT COMPLICATION (HCC): ICD-10-CM

## 2017-06-28 DIAGNOSIS — Z12.31 VISIT FOR SCREENING MAMMOGRAM: ICD-10-CM

## 2017-06-28 PROCEDURE — 77067 SCR MAMMO BI INCL CAD: CPT

## 2017-07-12 ENCOUNTER — LAB ONLY (OUTPATIENT)
Dept: FAMILY MEDICINE CLINIC | Age: 42
End: 2017-07-12

## 2017-07-12 ENCOUNTER — HOSPITAL ENCOUNTER (OUTPATIENT)
Dept: MAMMOGRAPHY | Age: 42
Discharge: HOME | End: 2017-07-12
Attending: OBSTETRICS & GYNECOLOGY
Payer: COMMERCIAL

## 2017-07-12 ENCOUNTER — HOSPITAL ENCOUNTER (OUTPATIENT)
Dept: ULTRASOUND IMAGING | Age: 42
Discharge: HOME | End: 2017-07-12
Attending: OBSTETRICS & GYNECOLOGY
Payer: COMMERCIAL

## 2017-07-12 DIAGNOSIS — E55.9 VITAMIN D DEFICIENCY: ICD-10-CM

## 2017-07-12 DIAGNOSIS — E78.5 HYPERLIPIDEMIA, UNSPECIFIED HYPERLIPIDEMIA TYPE: ICD-10-CM

## 2017-07-12 DIAGNOSIS — R92.8 ABNORMAL MAMMOGRAM: ICD-10-CM

## 2017-07-12 DIAGNOSIS — E11.9 TYPE 2 DIABETES MELLITUS WITHOUT COMPLICATION, WITHOUT LONG-TERM CURRENT USE OF INSULIN (HCC): Primary | ICD-10-CM

## 2017-07-12 PROCEDURE — 77065 DX MAMMO INCL CAD UNI: CPT

## 2017-07-13 LAB
25(OH)D3 SERPL-MCNC: 15.1 NG/ML (ref 32–100)
ALT SERPL-CCNC: 9 U/L (ref 5–40)
ANION GAP SERPL CALC-SCNC: 15 MMOL/L
AST SERPL W P-5'-P-CCNC: 13 U/L (ref 10–37)
BUN SERPL-MCNC: 11 MG/DL (ref 6–22)
CALCIUM SERPL-MCNC: 9.2 MG/DL (ref 8.4–10.5)
CHLORIDE SERPL-SCNC: 102 MMOL/L (ref 98–110)
CHOLEST SERPL-MCNC: 171 MG/DL (ref 110–200)
CO2 SERPL-SCNC: 24 MMOL/L (ref 20–32)
CREAT SERPL-MCNC: 0.4 MG/DL (ref 0.5–1.2)
CREATININE, URINE: 98 MG/DL
GFRAA, 66117: >60
GFRNA, 66118: >60
GLUCOSE SERPL-MCNC: 101 MG/DL (ref 65–99)
HDLC SERPL-MCNC: 37 MG/DL (ref 40–59)
LDLC SERPL CALC-MCNC: 115 MG/DL (ref 50–99)
MICROALB/CREAT RATIO, 140286: 1218.3 MCG/MG OF CREATININE (ref 0–30)
MICROALBUMIN,URINE RANDOM 140054: 1193.9 UG/ML (ref 0.1–17)
POTASSIUM SERPL-SCNC: 4.6 MMOL/L (ref 3.5–5.5)
SODIUM SERPL-SCNC: 141 MMOL/L (ref 133–145)
TRIGL SERPL-MCNC: 93 MG/DL (ref 40–149)
VLDLC SERPL CALC-MCNC: 19 MG/DL (ref 8–30)

## 2017-07-19 ENCOUNTER — HOSPITAL ENCOUNTER (OUTPATIENT)
Dept: MAMMOGRAPHY | Age: 42
Discharge: HOME | End: 2017-07-19
Attending: OBSTETRICS & GYNECOLOGY
Payer: COMMERCIAL

## 2017-07-19 DIAGNOSIS — R92.0 MAMMOGRAPHIC MICROCALCIFICATION: ICD-10-CM

## 2017-07-19 DIAGNOSIS — R92.8 ABNORMAL MAMMOGRAM: ICD-10-CM

## 2017-07-19 PROCEDURE — 88305 TISSUE EXAM BY PATHOLOGIST: CPT | Performed by: OBSTETRICS & GYNECOLOGY

## 2017-07-19 PROCEDURE — 74011000250 HC RX REV CODE- 250: Performed by: OBSTETRICS & GYNECOLOGY

## 2017-07-19 PROCEDURE — 19081 BX BREAST 1ST LESION STRTCTC: CPT

## 2017-07-19 PROCEDURE — 77065 DX MAMMO INCL CAD UNI: CPT

## 2017-07-19 RX ORDER — LIDOCAINE HYDROCHLORIDE 10 MG/ML
5 INJECTION, SOLUTION EPIDURAL; INFILTRATION; INTRACAUDAL; PERINEURAL
Status: COMPLETED | OUTPATIENT
Start: 2017-07-19 | End: 2017-07-19

## 2017-07-19 RX ORDER — LIDOCAINE HYDROCHLORIDE AND EPINEPHRINE 10; 10 MG/ML; UG/ML
1.5 INJECTION, SOLUTION INFILTRATION; PERINEURAL
Status: COMPLETED | OUTPATIENT
Start: 2017-07-19 | End: 2017-07-19

## 2017-07-19 RX ADMIN — LIDOCAINE HYDROCHLORIDE AND EPINEPHRINE 20 MG: 10; 10 INJECTION, SOLUTION INFILTRATION; PERINEURAL at 13:30

## 2017-07-19 RX ADMIN — LIDOCAINE HYDROCHLORIDE 3 ML: 10 INJECTION, SOLUTION EPIDURAL; INFILTRATION; INTRACAUDAL; PERINEURAL at 01:30

## 2017-07-24 ENCOUNTER — TELEPHONE (OUTPATIENT)
Dept: MAMMOGRAPHY | Age: 42
End: 2017-07-24

## 2017-07-24 NOTE — TELEPHONE ENCOUNTER
Called MsTucker Miguel Ángel Nath to inform her of benign breast biopsy results. Explained pathology findings and recommendation for follow up routine screening mammogram in 12 months. She expressed understanding and her results [facility] has been cancelled. Called referring provider to confirm they received the pathology report.

## 2017-07-26 RX ORDER — LOSARTAN POTASSIUM 50 MG/1
50 TABLET ORAL DAILY
Qty: 30 TAB | Refills: 3 | Status: SHIPPED | OUTPATIENT
Start: 2017-07-26 | End: 2017-12-21 | Stop reason: SDUPTHER

## 2017-07-26 NOTE — TELEPHONE ENCOUNTER
From: Sonja Mendoza  To: Jorje Gonzalez MD  Sent: 7/26/2017 3:54 PM EDT  Subject: Medication Renewal Request    Original authorizing provider: MD Ifrah Camarena.  Ulisses Russell would like a refill of the following medications:  losartan (COZAAR) 50 mg tablet Jorje Gonzalez MD]    Preferred pharmacy: [address], [address] [facility]    Comment:

## 2017-08-10 ENCOUNTER — TELEPHONE (OUTPATIENT)
Dept: FAMILY MEDICINE CLINIC | Age: 42
End: 2017-08-10

## 2017-08-13 DIAGNOSIS — E55.9 HYPOVITAMINOSIS D: ICD-10-CM

## 2017-08-13 RX ORDER — ERGOCALCIFEROL 1.25 MG/1
CAPSULE ORAL
Qty: 12 CAP | Refills: 0 | Status: SHIPPED | OUTPATIENT
Start: 2017-08-13 | End: 2017-11-11 | Stop reason: SDUPTHER

## 2017-10-11 ENCOUNTER — OFFICE VISIT (OUTPATIENT)
Dept: FAMILY MEDICINE CLINIC | Age: 42
End: 2017-10-11

## 2017-10-11 VITALS
SYSTOLIC BLOOD PRESSURE: 121 MMHG | WEIGHT: 208 LBS | DIASTOLIC BLOOD PRESSURE: 93 MMHG | RESPIRATION RATE: 18 BRPM | HEART RATE: 100 BPM | BODY MASS INDEX: 33.43 KG/M2 | HEIGHT: 66 IN | TEMPERATURE: 98.9 F | OXYGEN SATURATION: 99 %

## 2017-10-11 DIAGNOSIS — E78.00 PURE HYPERCHOLESTEROLEMIA: Primary | ICD-10-CM

## 2017-10-11 DIAGNOSIS — J06.9 ACUTE UPPER RESPIRATORY INFECTION: Primary | ICD-10-CM

## 2017-10-11 DIAGNOSIS — J02.9 SORE THROAT: ICD-10-CM

## 2017-10-11 LAB
S PYO AG THROAT QL: NEGATIVE
VALID INTERNAL CONTROL?: YES

## 2017-10-11 RX ORDER — AZITHROMYCIN 250 MG/1
TABLET, FILM COATED ORAL
Qty: 6 TAB | Refills: 0 | Status: SHIPPED | OUTPATIENT
Start: 2017-10-11 | End: 2017-10-16

## 2017-10-11 RX ORDER — LANCETS
EACH MISCELLANEOUS
Qty: 1 PACKAGE | Refills: 11 | Status: SHIPPED | OUTPATIENT
Start: 2017-10-11 | End: 2019-07-02

## 2017-10-11 NOTE — MR AVS SNAPSHOT
Visit Information Date & Time Provider Department Dept. Phone Encounter #  
 10/11/2017 10:00 AM Razia Reynoso[address] Antonella Day [id number] Follow-up Instructions Return if symptoms worsen or fail to improve. Upcoming Health Maintenance Date Due  
 PAP AKA CERVICAL CYTOLOGY 7/29/2017 INFLUENZA AGE 9 TO ADULT 8/1/2017 HEMOGLOBIN A1C Q6M 12/21/2017 FOOT EXAM Q1 4/6/2018 MICROALBUMIN Q1 7/13/2018 LIPID PANEL Q1 7/13/2018 EYE EXAM RETINAL OR DILATED Q1 7/19/2018 DTaP/Tdap/Td series (2 - Td) 1/1/2022 Allergies as of 10/11/2017  Review Complete On: 10/11/2017 By: Alicia Martinez LPN Severity Noted Reaction Type Reactions Lisinopril  12/29/2016    Cough Current Immunizations  Never Reviewed Name Date Influenza Vaccine PF 10/14/2015 TB Skin Test (PPD) Intradermal 12/14/2016  9:05 AM, 9/30/2015, 9/22/2015, 3/11/2013 Not reviewed this visit You Were Diagnosed With   
  
 Codes Comments Acute upper respiratory infection    -  Primary ICD-10-CM: J06.9 ICD-9-CM: 465.9 Sore throat     ICD-10-CM: J02.9 ICD-9-CM: 240 Vitals BP Pulse Temp Resp Height(growth percentile) Weight(growth percentile) (!) 121/93 (BP 1 Location: Left arm, BP Patient Position: Sitting) 100 98.9 °F (37.2 °C) (Oral) 18 5' 6\" (1.676 m) 208 lb (94.3 kg) LMP SpO2 BMI OB Status Smoking Status 01/01/2007 99% 33.57 kg/m2 Hysterectomy Former Smoker BMI and BSA Data Body Mass Index Body Surface Area  
 33.57 kg/m 2 2.1 m 2 Preferred Pharmacy Pharmacy Name Phone [address], [place] [address] [place] [address] [phone] [facility] [phone] Your Updated Medication List  
  
   
This list is accurate as of: 10/11/17 11:05 AM.  Always use your most recent med list.  
  
  
  
  
 azithromycin 250 mg tablet Commonly known as:  Wilder Daugherty Take 2 tablets today, then take 1 tablet daily  
  
 butalbital-acetaminophen-caffeine -40 mg per tablet Commonly known as:  Adri Pardon Take 1 Tab by mouth every six (6) hours as needed for Pain. Max Daily Amount: 4 Tabs. dextroamphetamine-amphetamine 20 mg tablet Commonly known as:  ADDERALL Take 1 Tab (20 mg total) by mouth two (2) times a day. Max Daily Amount: 40 mg  
  
 ergocalciferol 50,000 unit capsule Commonly known as:  ERGOCALCIFEROL  
TAKE 1 CAPSULE BY MOUTH EVERY 7 DAYS  
  
 glipiZIDE 10 mg tablet Commonly known as:  GLUCOTROL  
TAKE 1 TABLET BY MOUTH TWICE DAILY  
  
 glucose blood VI test strips strip Commonly known as:  blood glucose test  
To use with freestyle insulinx meter Check glucose three times daily. hydroCHLOROthiazide 25 mg tablet Commonly known as:  HYDRODIURIL Take 1 Tab by mouth daily as needed. insulin degludec 200 unit/mL (3 mL) Inpn Commonly known as:  TRESIBA FLEXTOUCH U-200  
10 Units by SubCUTAneous route daily. Insulin Needles (Disposable) 32 gauge x 5/32\" Ndle Commonly known as:  Miranda Pen Needle To be used once daily with Valetta Roscoe FlexTouch Pen. * Lancets Misc Blood sugar check TID * Lancets Misc To use with one touch mini delica device. Check glucose 3 times daily  
  
 losartan 50 mg tablet Commonly known as:  COZAAR Take 1 Tab by mouth daily. simvastatin 10 mg tablet Commonly known as:  ZOCOR Take 1 Tab by mouth nightly. SITagliptin 100 mg tablet Commonly known as:  Esa Eliseights Take 1 Tab by mouth daily. * Notice: This list has 2 medication(s) that are the same as other medications prescribed for you. Read the directions carefully, and ask your doctor or other care provider to review them with you. Prescriptions Sent to Pharmacy Refills Lancets misc 11 Sig: To use with one touch mini delica device. Check glucose 3 times daily  Class: Normal  
 Pharmacy: [id number] - Alfredo Miranda [address] [place] [address] AT [address]  [facility] [place] Ph #: [phone]  
 azithromycin (ZITHROMAX) 250 mg tablet 0 Sig: Take 2 tablets today, then take 1 tablet daily Class: Normal  
 Pharmacy: [facility] [address] [place] [address] Ph #: [phone] We Performed the Following AMB POC RAPID STREP A [01046 CPT(R)] Follow-up Instructions Return if symptoms worsen or fail to improve. Patient Instructions Upper Respiratory Infection (Cold): Care Instructions Your Care Instructions An upper respiratory infection, or URI, is an infection of the nose, sinuses, or throat. URIs are spread by coughs, sneezes, and direct contact. The common cold is the most frequent kind of URI. The flu and sinus infections are other kinds of URIs. Almost all URIs are caused by viruses. Antibiotics won't cure them. But you can treat most infections with home care. This may include drinking lots of fluids and taking over-the-counter pain medicine. You will probably feel better in 4 to 10 days. The doctor has checked you carefully, but problems can develop later. If you notice any problems or new symptoms, get medical treatment right away. Follow-up care is a key part of your treatment and safety. Be sure to make and go to all appointments, and call your doctor if you are having problems. It's also a good idea to know your test results and keep a list of the medicines you take. How can you care for yourself at home? · To prevent dehydration, drink plenty of fluids, enough so that your urine is light yellow or clear like water. Choose water and other caffeine-free clear liquids until you feel better. If you have kidney, heart, or liver disease and have to limit fluids, talk with your doctor before you increase the amount of fluids you drink. · Take an over-the-counter pain medicine, such as acetaminophen (Tylenol), ibuprofen (Advil, Motrin), or naproxen (Aleve). Read and follow all instructions on the label. · Before you use cough and cold medicines, check the label. These medicines may not be safe for young children or for people with certain health problems. · Be careful when taking over-the-counter cold or flu medicines and Tylenol at the same time. Many of these medicines have acetaminophen, which is Tylenol. Read the labels to make sure that you are not taking more than the recommended dose. Too much acetaminophen (Tylenol) can be harmful. · Get plenty of rest. 
· Do not smoke or allow others to smoke around you. If you need help quitting, talk to your doctor about stop-smoking programs and medicines. These can increase your chances of quitting for good. When should you call for help? Call 911 anytime you think you may need emergency care. For example, call if: 
· You have severe trouble breathing. Call your doctor now or seek immediate medical care if: 
· You seem to be getting much sicker. · You have new or worse trouble breathing. · You have a new or higher fever. · You have a new rash. Watch closely for changes in your health, and be sure to contact your doctor if: 
· You have a new symptom, such as a sore throat, an earache, or sinus pain. · You cough more deeply or more often, especially if you notice more mucus or a change in the color of your mucus. · You do not get better as expected. Where can you learn more? Go to http://jakob-niko.info/. Enter B382 in the search box to learn more about \"Upper Respiratory Infection (Cold): Care Instructions. \" Current as of: March 25, 2017 Content Version: 11.3 © 4960-2470 [employer], [employer].  Care instructions adapted under license by [employer] (which disclaims liability or warranty for this information). If you have questions about a medical condition or this instruction, always ask your healthcare professional. [address] any warranty or liability for your use of this information. Introducing [facility] HE[facility]! Dear Otilia Kurtz: Thank you for requesting a DooBop account. Our records indicate that you already have an active DooBop account. You can access your account anytime at [email or web address] Did you know that you can access your hospital and ER discharge instructions at any time in DooBop? You can also review all of your test results from your hospital stay or ER visit. Additional Information If you have questions, please visit the Frequently Asked Questions section of the DooBop website at [email or web address] Remember, DooBop is NOT to be used for urgent needs. For medical emergencies, dial 911. Now available from your iPhone and Android! Please provide this summary of care documentation to your next provider. Your primary care clinician is listed as [address]. If you have any questions after today's visit, please call [phone].

## 2017-10-11 NOTE — PROGRESS NOTES
HISTORY OF PRESENT ILLNESS  Joanna Dumont is a 40 y.o. female. HPI  Pt has had some pain in her throat X 2 days. + difficulty swallowing; no drainage. Right ear does hurt. + sick contacts. Pt has tried tylenol for sx , chloraseptic and salt water gargles. Chloraseptic helped more so. Current Outpatient Prescriptions:     Lancets misc, To use with one touch mini delica device. Check glucose 3 times daily, Disp: 1 Package, Rfl: 11    glipiZIDE (GLUCOTROL) 10 mg tablet, TAKE 1 TABLET BY MOUTH TWICE DAILY, Disp: 60 Tab, Rfl: 6    ergocalciferol (ERGOCALCIFEROL) 50,000 unit capsule, TAKE 1 CAPSULE BY MOUTH EVERY 7 DAYS, Disp: 12 Cap, Rfl: 0    dextroamphetamine-amphetamine (ADDERALL) 20 mg tablet, Take 1 Tab (20 mg total) by mouth two (2) times a day. Max Daily Amount: 40 mg, Disp: 60 Tab, Rfl: 0    losartan (COZAAR) 50 mg tablet, Take 1 Tab by mouth daily. , Disp: 30 Tab, Rfl: 3    Insulin Needles, Disposable, (REBEKAH PEN NEEDLE) 32 gauge x 5/32\" ndle, To be used once daily with Marii Snuffer FlexTouch Pen., Disp: 100 Pen Needle, Rfl: 3    glucose blood VI test strips (BLOOD GLUCOSE TEST) strip, To use with freestyle insulinx meter Check glucose three times daily. , Disp: 300 Strip, Rfl: 3    insulin degludec (TRESIBA FLEXTOUCH U-200) 200 unit/mL (3 mL) inpn, 10 Units by SubCUTAneous route daily. , Disp: 1 Adjustable Dose Pre-filled Pen Syringe, Rfl: 3    Lancets misc, Blood sugar check TID, Disp: 200 Each, Rfl: 11    butalbital-acetaminophen-caffeine (FIORICET, ESGIC) -40 mg per tablet, Take 1 Tab by mouth every six (6) hours as needed for Pain. Max Daily Amount: 4 Tabs., Disp: 60 Tab, Rfl: 0    SITagliptin (JANUVIA) 100 mg tablet, Take 1 Tab by mouth daily. , Disp: 30 Tab, Rfl: 6    simvastatin (ZOCOR) 10 mg tablet, Take 1 Tab by mouth nightly., Disp: 30 Tab, Rfl: 6    hydrochlorothiazide (HYDRODIURIL) 25 mg tablet, Take 1 Tab by mouth daily as needed. , Disp: 90 Tab, Rfl: 3    PMH,  Meds, Allergies, Family History, Social history reviewed      Review of Systems   Constitutional: Positive for chills. Negative for fever. Gastrointestinal: Negative for diarrhea and vomiting. Musculoskeletal:        Legs have been achy       Physical Exam   Constitutional: She appears well-developed and well-nourished. No distress. Cardiovascular: Normal rate and regular rhythm. Exam reveals no gallop and no friction rub. No murmur heard. Pulmonary/Chest: Breath sounds normal. No respiratory distress. She has no wheezes. She has no rales. Nursing note and vitals reviewed. + pustular exudate is noted on the right tonsillar pillars. TMs clear bilaterally;  Nares patent  Visit Vitals    BP (!) 121/93 (BP 1 Location: Left arm, BP Patient Position: Sitting)    Pulse 100    Temp 98.9 °F (37.2 °C) (Oral)    Resp 18    Ht 5' 6\" (1.676 m)    Wt 208 lb (94.3 kg)    LMP 01/01/2007    SpO2 99%    BMI 33.57 kg/m2     Strep A neg    ASSESSMENT and PLAN    ICD-10-CM ICD-9-CM    1. Acute upper respiratory infection J06.9 465.9 azithromycin (ZITHROMAX) 250 mg tablet   2. Sore throat J02.9 462 AMB POC RAPID STREP A      azithromycin (ZITHROMAX) 250 mg tablet       As above,   New   treatment plan as listed below  Orders Placed This Encounter    AMB POC RAPID STREP A    Lancets misc    azithromycin (ZITHROMAX) 250 mg tablet     Follow-up Disposition:  Return if symptoms worsen or fail to improve. An After Visit Summary was printed and given to the patient. This has been fully explained to the patient, who indicates understanding. May consider watching sx over the next few days to see if sx improve then start abx.

## 2017-10-11 NOTE — PROGRESS NOTES
Chief Complaint   Patient presents with    Sore Throat    Ear Pain   Patient is here today for sore throat and ear pain x   1. Have you been to the ER, urgent care clinic since your last visit? Hospitalized since your last visit? No    2. Have you seen or consulted any other health care providers outside of the [address] [place] [address] since your last visit? Include any pap smears or colon screening.  No

## 2017-10-11 NOTE — LETTER
NOTIFICATION RETURN TO WORK / SCHOOL 
 
10/11/2017 11:06 AM 
 
Ms. Calista Cabrera [address] [facility] [id number] To Whom It May Concern: 
 
Calista Cabrera [facility] the [facility] She will return to work/school on: 10/13/2017 If there are questions or concerns please have the patient contact our office. Sincerely, Manish Hardin MD

## 2017-10-11 NOTE — PATIENT INSTRUCTIONS

## 2017-10-11 NOTE — PROGRESS NOTES
1. Have you been to the ER, urgent care clinic since your last visit? Hospitalized since your last visit? No    2. Have you seen or consulted any other health care providers outside of the [address] [place] [address] since your last visit? Include any pap smears or colon screening.  No

## 2017-11-01 ENCOUNTER — TELEPHONE (OUTPATIENT)
Dept: FAMILY MEDICINE CLINIC | Age: 42
End: 2017-11-01

## 2017-11-01 ENCOUNTER — CLINICAL SUPPORT (OUTPATIENT)
Dept: FAMILY MEDICINE CLINIC | Age: 42
End: 2017-11-01

## 2017-11-01 VITALS — TEMPERATURE: 98.6 F

## 2017-11-01 DIAGNOSIS — Z23 ENCOUNTER FOR IMMUNIZATION: Primary | ICD-10-CM

## 2017-11-01 NOTE — TELEPHONE ENCOUNTER
I received a insurance wellness form from pt to be signed by PCP [facility]

## 2017-11-11 DIAGNOSIS — E55.9 HYPOVITAMINOSIS D: ICD-10-CM

## 2017-11-11 RX ORDER — ERGOCALCIFEROL 1.25 MG/1
CAPSULE ORAL
Qty: 12 CAP | Refills: 0 | Status: SHIPPED | OUTPATIENT
Start: 2017-11-11 | End: 2018-02-06 | Stop reason: SDUPTHER

## 2018-02-06 DIAGNOSIS — E55.9 HYPOVITAMINOSIS D: ICD-10-CM

## 2018-02-07 RX ORDER — ERGOCALCIFEROL 1.25 MG/1
CAPSULE ORAL
Qty: 12 CAP | Refills: 0 | Status: SHIPPED | OUTPATIENT
Start: 2018-02-07 | End: 2018-05-16 | Stop reason: SDUPTHER

## 2018-05-02 ENCOUNTER — OFFICE VISIT (OUTPATIENT)
Dept: FAMILY MEDICINE CLINIC | Age: 43
End: 2018-05-02

## 2018-05-02 ENCOUNTER — HOSPITAL ENCOUNTER (OUTPATIENT)
Dept: LAB | Age: 43
Discharge: HOME | End: 2018-05-02

## 2018-05-02 DIAGNOSIS — E78.00 PURE HYPERCHOLESTEROLEMIA: ICD-10-CM

## 2018-05-02 DIAGNOSIS — E11.9 TYPE 2 DIABETES MELLITUS WITHOUT COMPLICATION, UNSPECIFIED WHETHER LONG TERM INSULIN USE (HCC): Primary | ICD-10-CM

## 2018-05-02 PROBLEM — E11.21 TYPE 2 DIABETES WITH NEPHROPATHY (HCC): Status: ACTIVE | Noted: 2018-05-02

## 2018-05-02 LAB — HBA1C MFR BLD HPLC: 7.5 %

## 2018-05-02 PROCEDURE — 99001 SPECIMEN HANDLING PT-LAB: CPT | Performed by: NURSE PRACTITIONER

## 2018-05-02 RX ORDER — SIMVASTATIN 10 MG/1
TABLET, FILM COATED ORAL
Qty: 90 TAB | Refills: 1 | Status: SHIPPED | OUTPATIENT
Start: 2018-05-02 | End: 2018-10-26 | Stop reason: SDUPTHER

## 2018-05-02 RX ORDER — LOSARTAN POTASSIUM 50 MG/1
TABLET ORAL
Qty: 90 TAB | Refills: 1 | Status: SHIPPED | OUTPATIENT
Start: 2018-05-02 | End: 2018-12-11 | Stop reason: ALTCHOICE

## 2018-05-02 RX ORDER — GLIPIZIDE 10 MG/1
TABLET ORAL
Qty: 180 TAB | Refills: 1 | Status: SHIPPED | OUTPATIENT
Start: 2018-05-02 | End: 2018-10-26 | Stop reason: SDUPTHER

## 2018-05-02 NOTE — PATIENT INSTRUCTIONS

## 2018-05-02 NOTE — MR AVS SNAPSHOT
[facility] 
 
 
 [address] [address] [address] [address] 
[phone] Patient: Joanna Lane MRN: [id number] CGR:[birth year] Visit Information Date & Time Provider Department Dept. Phone Encounter #  
 5/2/2018  7:00 AM Liane Gr NP [address] [place] [address] [id number] Follow-up Instructions Return in about 3 months (around 8/2/2018) for DM. Upcoming Health Maintenance Date Due  
 PAP AKA CERVICAL CYTOLOGY 7/29/2017 HEMOGLOBIN A1C Q6M 12/21/2017 FOOT EXAM Q1 4/6/2018 MICROALBUMIN Q1 7/13/2018 LIPID PANEL Q1 7/13/2018 EYE EXAM RETINAL OR DILATED Q1 7/19/2018 Influenza Age 5 to Adult 8/1/2018 DTaP/Tdap/Td series (2 - Td) 1/1/2022 Allergies as of 5/2/2018  Review Complete On: 5/2/2018 By: Liane Gr NP Severity Noted Reaction Type Reactions Lisinopril  12/29/2016    Cough Current Immunizations  Never Reviewed Name Date Influenza Vaccine (Quad) PF 11/1/2017 Influenza Vaccine PF 10/14/2015 TB Skin Test (PPD) Intradermal 12/14/2016  9:05 AM, 9/30/2015, 9/22/2015, 3/11/2013 Not reviewed this visit You Were Diagnosed With   
  
 Codes Comments Type 2 diabetes mellitus without complication, unspecified whether long term insulin use (HCC)    -  Primary ICD-10-CM: E11.9 ICD-9-CM: 250.00 Pure hypercholesterolemia     ICD-10-CM: E78.00 ICD-9-CM: 272.0 Vitals BP Pulse Resp Height(growth percentile) Weight(growth percentile) LMP  
 130/80 (BP 1 Location: Right arm, BP Patient Position: Sitting) 88 16 5' 6\" (1.676 m) 209 lb 12.8 oz (95.2 kg) 01/01/2007 SpO2 BMI OB Status Smoking Status 99% 33.86 kg/m2 Hysterectomy Former Smoker Vitals History BMI and BSA Data Body Mass Index Body Surface Area  
 33.86 kg/m 2 2.11 m 2 Preferred Pharmacy Pharmacy Name Phone [address], [facility] [phone] Your Updated Medication List  
  
   
This list is accurate as of 5/2/18  7:44 AM.  Always use your most recent med list.  
  
  
  
  
 butalbital-acetaminophen-caffeine -40 mg per tablet Commonly known as:  Rayray Garcia Take 1 Tab by mouth every six (6) hours as needed for Pain. Max Daily Amount: 4 Tabs. dextroamphetamine-amphetamine 20 mg tablet Commonly known as:  ADDERALL Take 1 Tab (20 mg total) by mouth two (2) times a day. Max Daily Amount: 40 mg  
  
 ergocalciferol 50,000 unit capsule Commonly known as:  ERGOCALCIFEROL  
TAKE 1 CAPSULE BY MOUTH EVERY 7 DAYS  
  
 glipiZIDE 10 mg tablet Commonly known as:  GLUCOTROL  
TAKE 1 TABLET BY MOUTH TWICE DAILY  
  
 glucose blood VI test strips strip Commonly known as:  blood glucose test  
To use with freestyle insulinx meter Check glucose three times daily. hydroCHLOROthiazide 25 mg tablet Commonly known as:  HYDRODIURIL Take 1 Tab by mouth daily as needed. insulin degludec 200 unit/mL (3 mL) Inpn Commonly known as:  TRESIBA FLEXTOUCH U-200  
10 Units by SubCUTAneous route daily. Insulin Needles (Disposable) 32 gauge x 5/32\" Ndle Commonly known as:  Miranda Pen Needle To be used once daily with [place] FlexTouch Pen. * Lancets Misc Blood sugar check TID * Lancets Misc To use with one touch mini delica device. Check glucose 3 times daily  
  
 losartan 50 mg tablet Commonly known as:  COZAAR  
TAKE 1 TABLET BY MOUTH DAILY  
  
 simvastatin 10 mg tablet Commonly known as:  ZOCOR  
TAKE 1 TABLET BY MOUTH EVERY NIGHT SITagliptin 100 mg tablet Commonly known as:  Erum Bodily TAKE 1 TABLET BY MOUTH DAILY * Notice: This list has 2 medication(s) that are the same as other medications prescribed for you.  Read the directions carefully, and ask your doctor or other care provider to review them with you. Prescriptions Sent to Pharmacy Refills SITagliptin (JANUVIA) 100 mg tablet 1 Sig: TAKE 1 TABLET BY MOUTH DAILY Class: Normal  
 Pharmacy: [address], [address] [place] [address] Ph #: [phone]  
 losartan (COZAAR) 50 mg tablet 1 Sig: TAKE 1 TABLET BY MOUTH DAILY Class: Normal  
 Pharmacy: [address], [address] [place] [address] Ph #: [phone]  
 glipiZIDE (GLUCOTROL) 10 mg tablet 1 Sig: TAKE 1 TABLET BY MOUTH TWICE DAILY Class: Normal  
 Pharmacy: [address], [address] [place] [address] Ph #: [phone]  
 simvastatin (ZOCOR) 10 mg tablet 1 Sig: TAKE 1 TABLET BY MOUTH EVERY NIGHT Class: Normal  
 Pharmacy: [address], [facility] Ph #: [phone] We Performed the Following AMB POC HEMOGLOBIN A1C [70416 CPT(R)]  DIABETES FOOT EXAM [[facility]] Follow-up Instructions Return in about 3 months (around 8/2/2018) for DM. To-Do List   
 05/02/2018 Lab:  LIPID PANEL   
  
 05/02/2018 Lab:  METABOLIC PANEL, COMPREHENSIVE   
  
 05/02/2018 Lab:  MICROALBUMIN, UR, RAND W/ MICROALB/CREAT RATIO Patient Instructions Nutrition Tips for Diabetes: After Your Visit Your Care Instructions A healthy diet is important to manage diabetes. It helps you lose weight (if you need to) and keep it off. It gives you the nutrition and energy your body needs and helps prevent heart disease. But a diet for diabetes does not mean that you have to eat special foods. You can eat what your family eats, including occasional sweets and other favorites. But you do have to pay attention to how often you eat and how much you eat of certain foods. The right plan for you will give you meals that help you keep your blood sugar at healthy levels. Try to eat a variety of foods and to spread carbohydrate throughout the day. Carbohydrate raises blood sugar higher and more quickly than any other nutrient does. Carbohydrate is found in sugar, breads and cereals, fruit, starchy vegetables such as potatoes and corn, and milk and yogurt. You may want to work with a dietitian or diabetes educator to help you plan meals and snacks. A dietitian or diabetes educator also can help you lose weight if that is one of your goals. The following tips can help you enjoy your meals and stay healthy. Follow-up care is a key part of your treatment and safety. Be sure to make and go to all appointments, and call your doctor if you are having problems. Its also a good idea to know your test results and keep a list of the medicines you take. How can you care for yourself at home? · Learn which foods have carbohydrate and how much carbohydrate to eat. A dietitian or diabetes educator can help you learn to keep track of how much carbohydrate you eat. · Spread carbohydrate throughout the day. Eat some carbohydrate at all meals, but do not eat too much at any one time. · Plan meals to include food from all the food groups. These are the food groups and some example portion sizes: ¨ Grains: 1 slice of bread (1 ounce), ½ cup of cooked cereal, and 1/3 cup of cooked pasta or rice. These have about 15 grams of carbohydrate in a serving. Choose whole grains such as whole wheat bread or crackers, oatmeal, and brown rice more often than refined grains. ¨ Fruit: 1 small fresh fruit, such as an apple or orange; ½ of a banana; ½ cup of chopped, cooked, or canned fruit; ½ cup of fruit juice; 1 cup of melon or raspberries; and 2 tablespoons of dried fruit. These have about 15 grams of carbohydrate in a serving. ¨ Dairy: 1 cup of nonfat or low-fat milk and 2/3 cup of plain yogurt. These have about 15 grams of carbohydrate in a serving. ¨ Protein foods: Beef, chicken, turkey, fish, eggs, tofu, cheese, cottage cheese, and peanut butter. A serving size of meat is 3 ounces, which is about the size of a deck of cards. Examples of meat substitute serving sizes (equal to 1 ounce of meat) are 1/4 cup of cottage cheese, 1 egg, 1 tablespoon of peanut butter, and ½ cup of tofu. These have very little or no carbohydrate per serving. ¨ Vegetables: Starchy vegetables such as ½ cup of cooked dried beans, peas, potatoes, or corn have about 15 grams of carbohydrate. Nonstarchy vegetables have very little carbohydrate, such as 1 cup of raw leafy vegetables (such as spinach), ½ cup of other vegetables (cooked or chopped), and 3/4 cup of vegetable juice. · Use the plate format to plan meals. It is a good, quick way to make sure that you have a balanced meal. It also helps you spread carbohydrate throughout the day. You divide your plate by types of foods. Put vegetables on half the plate, meat or meat substitutes on one-quarter of the plate, and a grain or starchy vegetable (such as brown rice or a potato) in the final quarter of the plate. To this you can add a small piece of fruit and 1 cup of milk or yogurt, depending on how much carbohydrate you are supposed to eat at a meal. 
· Talk to your dietitian or diabetes educator about ways to add limited amounts of sweets into your meal plan. You can eat these foods now and then, as long as you include the amount of carbohydrate they have in your daily carbohydrate allowance. · If you drink alcohol, limit it to no more than 1 drink a day for women and 2 drinks a day for men. If you are pregnant, no amount of alcohol is known to be safe. · Protein, fat, and fiber do not raise blood sugar as much as carbohydrate does. If you eat a lot of these nutrients in a meal, your blood sugar will rise more slowly than it would otherwise. · Limit saturated fats, such as those from meat and dairy products.  Try to replace it with monounsaturated fat, such as olive oil. This is a healthier choice because people who have diabetes are at higher-than-average risk of heart disease. But use a modest amount of olive oil. A tablespoon of olive oil has 14 grams of fat and 120 calories. · Exercise lowers blood sugar. If you take insulin by shots or pump, you can use less than you would if you were not exercising. Keep in mind that timing matters. If you exercise within 1 hour after a meal, your body may need less insulin for that meal than it would if you exercised 3 hours after the meal. Test your blood sugar to find out how exercise affects your need for insulin. · Exercise on most days of the week. Aim for at least 30 minutes. Exercise helps you stay at a healthy weight and helps your body use insulin. Walking is an easy way to get exercise. Gradually increase the amount you walk every day. You also may want to swim, bike, or do other activities. When you eat out · Learn to estimate the serving sizes of foods that have carbohydrate. If you measure food at home, it will be easier to estimate the amount in a serving of restaurant food. · If the meal you order has too much carbohydrate (such as potatoes, corn, or baked beans), ask to have a low-carbohydrate food instead. Ask for a salad or green vegetables. · If you use insulin, check your blood sugar before and after eating out to help you plan how much to eat in the future. · If you eat more carbohydrate at a meal than you had planned, take a walk or do other exercise. This will help lower your blood sugar. Where can you learn more? Go to [employer] Enter T379 in the search box to learn more about \"Nutrition Tips for Diabetes: After Your Visit. \"  
© 2698-9223 [employer].  Care instructions adapted under license by [employer] (which disclaims liability or warranty for this information). This care instruction is for use with your licensed healthcare professional. If you have questions about a medical condition or this instruction, always ask your healthcare professional. [address] any warranty or liability for your use of this information. Content Version: 39.9.013121; Current as of: June 4, 2014 Introducing [facility]! Dear Amna Hester: Thank you for requesting a [employer] account. Our records indicate that you already have an active [employer] account. You can access your account anytime at [email or web address] Did you know that you can access your hospital and ER discharge instructions at any time in [employer]? You can also review all of your test results from your hospital stay or ER visit. Additional Information If you have questions, please visit the Frequently Asked Questions section of the [employer] website at [email or web address] Remember, [employer] is NOT to be used for urgent needs. For medical emergencies, dial 911. Now available from your iPhone and Android! Please provide this summary of care documentation to your next provider. Your primary care clinician is listed as [address]. If you have any questions after today's visit, please call [phone].

## 2018-05-02 NOTE — PROGRESS NOTES
Chief Complaint   Patient presents with    Diabetes     Patient is here today for F/U DM. Pt sts here BS was 170 this a.m. 1. Have you been to the ER, urgent care clinic since your last visit? Hospitalized since your last visit? No    2. Have you seen or consulted any other health care providers outside of the [facility] since your last visit? Include any pap smears or colon screening.  No

## 2018-05-02 NOTE — PROGRESS NOTES
Subjective:    Pritesh John is a 44y.o. year old female seen for follow up of diabetes. She also has hypertension, hyperlipidemia and obesity. Diabetic Review of Systems - medication compliance: compliant all of the time, diabetic diet compliance: compliant all of the time, home glucose monitoring: is performed regularly, fasting values range 112, minimum reading is 51, maximum reading is 170, further diabetic ROS: no polyuria or polydipsia, no chest pain or dyspnea, no chest pain, dyspnea or TIA's, no numbness, tingling or pain in extremities, last eye exam approximately 1year ago, acute symptoms are none. Other symptoms and concerns: Patient reports she had episodes of hypoglycemia. Has not been taking insulin due to hypoglycemia    Patient Active Problem List   Diagnosis Code    Diabetes mellitus, type II ([facility] E11.9    Hyperlipidemia E78.5    ADHD (attention deficit hyperactivity disorder) F90.9     Current Outpatient Prescriptions   Medication Sig Dispense Refill    ergocalciferol (ERGOCALCIFEROL) 50,000 unit capsule TAKE 1 CAPSULE BY MOUTH EVERY 7 DAYS 12 Cap 0    simvastatin (ZOCOR) 10 mg tablet TAKE 1 TABLET BY MOUTH EVERY NIGHT 30 Tab 6    JANUVIA 100 mg tablet TAKE 1 TABLET BY MOUTH DAILY 30 Tab 3    losartan (COZAAR) 50 mg tablet TAKE 1 TABLET BY MOUTH DAILY 30 Tab 3    dextroamphetamine-amphetamine (ADDERALL) 20 mg tablet Take 1 Tab (20 mg total) by mouth two (2) times a day. Max Daily Amount: 40 mg 60 Tab 0    Lancets misc To use with one touch mini delica device. Check glucose 3 times daily 1 Package 11    glipiZIDE (GLUCOTROL) 10 mg tablet TAKE 1 TABLET BY MOUTH TWICE DAILY 60 Tab 6    Insulin Needles, Disposable, (REBEKAH PEN NEEDLE) 32 gauge x 5/32\" ndle To be used once daily with Tariq Garcia FlexTouch Pen. 100 Pen Needle 3    glucose blood VI test strips (BLOOD GLUCOSE TEST) strip To use with freestyle insulinx meter Check glucose three times daily.  300 Strip 3    insulin degludec (TRESIBA FLEXTOUCH U-200) 200 unit/mL (3 mL) inpn 10 Units by SubCUTAneous route daily. 1 Adjustable Dose Pre-filled Pen Syringe 3    Lancets misc Blood sugar check  Each 11    butalbital-acetaminophen-caffeine (FIORICET, ESGIC) -40 mg per tablet Take 1 Tab by mouth every six (6) hours as needed for Pain. Max Daily Amount: 4 Tabs. 60 Tab 0    hydrochlorothiazide (HYDRODIURIL) 25 mg tablet Take 1 Tab by mouth daily as needed. 90 Tab 3      Allergies   Allergen Reactions    Lisinopril Cough     Past Surgical History:   Procedure Laterality Date    HX GYN      hysterectomy    HX HYSTERECTOMY      2007    HX ORTHOPAEDIC  12/2014    knee    HX WISDOM TEETH EXTRACTION       Family History   Problem Relation Age of Onset    Diabetes Other     Heart Disease Other     Hypertension Other     Hypertension Mother     Diabetes Mother     High Cholesterol Mother     Diabetes Father     Hypertension Father     High Cholesterol Father       Lab Results   Component Value Date/Time    Cholesterol, total 171 07/13/2017 01:12 AM    HDL Cholesterol 37 (L) 07/13/2017 01:12 AM    LDL, calculated 115 (H) 07/13/2017 01:12 AM    VLDL, calculated 19 07/13/2017 01:12 AM    Triglyceride 93 07/13/2017 01:12 AM    CHOL/HDL Ratio 5.3 (H) 01/21/2016 09:00 AM     Lab Results   Component Value Date/Time    Sodium 141 07/13/2017 01:12 AM    Potassium 4.6 07/13/2017 01:12 AM    Chloride 102 07/13/2017 01:12 AM    CO2 24 07/13/2017 01:12 AM    Anion gap 15.0 07/13/2017 01:12 AM    Glucose 101 (H) 07/13/2017 01:12 AM    BUN 11 07/13/2017 01:12 AM    Creatinine 0.4 (L) 07/13/2017 01:12 AM    BUN/Creatinine ratio 19 01/21/2016 09:00 AM    GFR est AA >60 01/21/2016 09:00 AM    GFR est non-AA >60 01/21/2016 09:00 AM    Calcium 9.2 07/13/2017 01:12 AM    Bilirubin, total 0.2 01/21/2016 09:00 AM    AST (SGOT) 13 07/13/2017 01:12 AM    Alk.  phosphatase 78 01/21/2016 09:00 AM    Protein, total 7.2 01/21/2016 09:00 AM Albumin 3.2 (L) 01/21/2016 09:00 AM    Globulin 4.0 01/21/2016 09:00 AM    A-G Ratio 0.8 01/21/2016 09:00 AM    ALT (SGPT) 9 07/13/2017 01:12 AM     Lab Results   Component Value Date/Time    WBC 7.4 01/21/2016 09:00 AM    HGB 12.0 01/21/2016 09:00 AM    HCT 38.3 01/21/2016 09:00 AM    PLATELET 154 56/66/1272 09:00 AM    MCV 81.8 01/21/2016 09:00 AM     Wt Readings from Last 3 Encounters:   10/11/17 208 lb (94.3 kg)   06/21/17 207 lb (93.9 kg)   04/20/17 217 lb (98.4 kg)     Last Point of Care HGB A1C  Hemoglobin A1c (POC)   Date Value Ref Range Status   06/21/2017 6.9 % Final      BP Readings from Last 3 Encounters:   05/02/18 130/80   10/11/17 (!) 121/93   06/21/17 134/90       Last Point of Care Urine mircoalbumin  Results for orders placed or performed in visit on 10/11/17   AMB POC RAPID STREP A   Result Value Ref Range    VALID INTERNAL CONTROL POC Yes     Group A Strep Ag Negative Negative     Lab Results   Component Value Date/Time    Microalbumin/Creat ratio (mg/g creat) 503 01/21/2016 09:00 AM    Microalb/Creat ratio (ug/mg creat.) 1218.3 (H) 07/13/2017 01:12 AM    Microalbumin,urine random 63.50 (H) 01/21/2016 09:00 AM    Microalbumin urine (POC) 150 12/06/2013 12:30 PM       Last Diabetic Foot Exam on: 4/6/17        Objective:  Visit Vitals    LMP 01/01/2007     Awake and alert in no acute distress   Neck supple without lymphadenopathy, no carotid artery bruits auscultated bilaterally. No thyromegaly   Lungs clear throughout   S1 S2 RRR without ectopy or murmur auscultated.    Extremities: no clubbing, cyanosis, peripheral edema   Foot exam: monofilament no abnormalities, DP/PT pulses +2 bilaterally, ankle reflex +2 bilaterally   Integumentary: no rashes   Reviewed vital signs       Diabetic foot exam:   Left: Reflexes 2+     Vibratory sensation normal    Filament test normal sensation with micro filament   Pulse DP: 2+ (normal)   Pulse PT: 2+ (normal)   Deformities: None  Right: Reflexes 2+   Vibratory sensation normal   Filament test normal sensation with micro filament   Pulse DP: 2+ (normal)   Pulse PT: 2+ (normal)   Deformities: None      Assessment/Plan:    ICD-10-CM ICD-9-CM    1. Type 2 diabetes mellitus without complication, unspecified whether long term insulin use (HCC) E11.9 250.00 AMB POC HEMOGLOBIN A1C      HM DIABETES FOOT EXAM      MICROALBUMIN, UR, RAND W/ MICROALB/CREAT RATIO      METABOLIC PANEL, COMPREHENSIVE      LIPID PANEL      CANCELED: AMB POC GLUCOSE BLOOD, BY GLUCOSE MONITORING DEVICE   2. Pure hypercholesterolemia E78.00 272.0       stable  Issues reviewed with her: all medications, side effects and compliance discussed carefully and annual eye examinations at Ophthalmology discussed. I have discussed the diagnosis with the patient and the intended plan as seen in the above orders. The patient has received an after-visit summary and questions were answered concerning future plans. I have discussed medication side effects and warnings with the patient as well. Patient agreeable with above plan and verbalizes understanding. Follow-up Disposition:  Return in about 3 months (around 8/2/2018) for DM.

## 2018-05-03 VITALS
WEIGHT: 209.8 LBS | BODY MASS INDEX: 33.72 KG/M2 | HEART RATE: 88 BPM | TEMPERATURE: 98 F | SYSTOLIC BLOOD PRESSURE: 130 MMHG | RESPIRATION RATE: 16 BRPM | OXYGEN SATURATION: 99 % | DIASTOLIC BLOOD PRESSURE: 80 MMHG | HEIGHT: 66 IN

## 2018-05-03 LAB
ALBUMIN SERPL-MCNC: 3.5 G/DL (ref 3.5–5.5)
ALBUMIN/CREAT UR: 3049.4 MG/G CREAT (ref 0–30)
ALBUMIN/GLOB SERPL: 1.2 {RATIO} (ref 1.2–2.2)
ALP SERPL-CCNC: 56 IU/L (ref 39–117)
ALT SERPL-CCNC: 12 IU/L (ref 0–32)
AST SERPL-CCNC: 13 IU/L (ref 0–40)
BILIRUB SERPL-MCNC: 0.4 MG/DL (ref 0–1.2)
BUN SERPL-MCNC: 10 MG/DL (ref 6–24)
BUN/CREAT SERPL: 21 (ref 9–23)
CALCIUM SERPL-MCNC: 9.7 MG/DL (ref 8.7–10.2)
CHLORIDE SERPL-SCNC: 102 MMOL/L (ref 96–106)
CHOLEST SERPL-MCNC: 200 MG/DL (ref 100–199)
CO2 SERPL-SCNC: 25 MMOL/L (ref 18–29)
CREAT SERPL-MCNC: 0.47 MG/DL (ref 0.57–1)
CREAT UR-MCNC: 77.5 MG/DL
GFR SERPLBLD CREATININE-BSD FMLA CKD-EPI: 122 ML/MIN/1.73
GFR SERPLBLD CREATININE-BSD FMLA CKD-EPI: 141 ML/MIN/1.73
GLOBULIN SER CALC-MCNC: 3 G/DL (ref 1.5–4.5)
GLUCOSE SERPL-MCNC: 198 MG/DL (ref 65–99)
HDLC SERPL-MCNC: 42 MG/DL
LDLC SERPL CALC-MCNC: 138 MG/DL (ref 0–99)
MICROALBUMIN UR-MCNC: 2363.3 UG/ML
POTASSIUM SERPL-SCNC: 4.7 MMOL/L (ref 3.5–5.2)
PROT SERPL-MCNC: 6.5 G/DL (ref 6–8.5)
SODIUM SERPL-SCNC: 140 MMOL/L (ref 134–144)
TRIGL SERPL-MCNC: 99 MG/DL (ref 0–149)
VLDLC SERPL CALC-MCNC: 20 MG/DL (ref 5–40)

## 2018-05-07 DIAGNOSIS — R80.9 MICROALBUMINURIA: Primary | ICD-10-CM

## 2018-05-16 DIAGNOSIS — E55.9 HYPOVITAMINOSIS D: ICD-10-CM

## 2018-05-16 NOTE — TELEPHONE ENCOUNTER
From: Shilpi Otoole  To: Herbie Amor MD  Sent: 5/16/2018 10:09 AM EDT  Subject: Medication Renewal Request    Original authorizing provider: MD David Senior  [facility] Ask would like a refill of the following medications:  ergocalciferol (ERGOCALCIFEROL) 50,000 unit capsule Herbie Amor MD]    Preferred pharmacy: [address], [place] - [address] [place] [address] [facility]    Comment:

## 2018-05-17 RX ORDER — ERGOCALCIFEROL 1.25 MG/1
50000 CAPSULE ORAL
Qty: 12 CAP | Refills: 0 | Status: SHIPPED | OUTPATIENT
Start: 2018-05-17 | End: 2018-06-24 | Stop reason: SDUPTHER

## 2018-05-21 RX ORDER — INSULIN PUMP SYRINGE, 3 ML
EACH MISCELLANEOUS
Qty: 1 KIT | Refills: 1 | Status: SHIPPED | OUTPATIENT
Start: 2018-05-21 | End: 2021-10-05

## 2018-06-24 DIAGNOSIS — E55.9 HYPOVITAMINOSIS D: ICD-10-CM

## 2018-06-25 NOTE — TELEPHONE ENCOUNTER
From: Yasmine Larson  To: Emerson Lujan NP  Sent: 6/24/2018 8:42 PM EDT  Subject: Medication Renewal Request    Original authorizing provider: KYLE Toure would like a refill of the following medications:  ergocalciferol (ERGOCALCIFEROL) 50,000 unit capsule Emerson Lujan NP]    Preferred pharmacy: [address] [place] [address] AT [address]    Comment:

## 2018-06-26 RX ORDER — ERGOCALCIFEROL 1.25 MG/1
50000 CAPSULE ORAL
Qty: 12 CAP | Refills: 0 | Status: SHIPPED | OUTPATIENT
Start: 2018-06-26 | End: 2018-09-27 | Stop reason: SDUPTHER

## 2018-06-29 ENCOUNTER — HOSPITAL ENCOUNTER (OUTPATIENT)
Dept: ULTRASOUND IMAGING | Age: 43
Discharge: HOME | End: 2018-06-29
Attending: INTERNAL MEDICINE
Payer: COMMERCIAL

## 2018-06-29 DIAGNOSIS — R80.1 PERSISTENT PROTEINURIA, UNSPECIFIED: ICD-10-CM

## 2018-06-29 PROCEDURE — 76770 US EXAM ABDO BACK WALL COMP: CPT

## 2018-08-22 ENCOUNTER — HOSPITAL ENCOUNTER (OUTPATIENT)
Age: 43
Discharge: HOME | End: 2018-08-22
Attending: ORTHOPAEDIC SURGERY
Payer: COMMERCIAL

## 2018-08-22 DIAGNOSIS — M75.02 ADHESIVE CAPSULITIS OF LEFT SHOULDER: ICD-10-CM

## 2018-08-22 PROCEDURE — 73221 MRI JOINT UPR EXTREM W/O DYE: CPT

## 2018-08-24 ENCOUNTER — HOSPITAL ENCOUNTER (OUTPATIENT)
Dept: LAB | Age: 43
Discharge: HOME | End: 2018-08-24

## 2018-08-24 PROCEDURE — 99001 SPECIMEN HANDLING PT-LAB: CPT | Performed by: INTERNAL MEDICINE

## 2018-08-27 RX ORDER — AMOXICILLIN AND CLAVULANATE POTASSIUM 875; 125 MG/1; MG/1
1 TABLET, FILM COATED ORAL EVERY 12 HOURS
Qty: 20 TAB | Refills: 0 | Status: SHIPPED | OUTPATIENT
Start: 2018-08-27 | End: 2018-09-06

## 2018-08-29 RX ORDER — FLUCONAZOLE 150 MG/1
150 TABLET ORAL
Qty: 3 TAB | Refills: 0 | Status: SHIPPED | OUTPATIENT
Start: 2018-08-29 | End: 2018-09-05

## 2018-09-06 RX ORDER — FLUCONAZOLE 150 MG/1
150 TABLET ORAL
Qty: 3 TAB | Refills: 0 | Status: SHIPPED | OUTPATIENT
Start: 2018-09-06 | End: 2018-09-13

## 2018-09-08 ENCOUNTER — HOSPITAL ENCOUNTER (OUTPATIENT)
Dept: CT IMAGING | Age: 43
Discharge: HOME | End: 2018-09-08
Attending: INTERNAL MEDICINE
Payer: COMMERCIAL

## 2018-09-08 DIAGNOSIS — R80.1 PERSISTENT PROTEINURIA: ICD-10-CM

## 2018-09-08 DIAGNOSIS — N28.1 RENAL CYST: ICD-10-CM

## 2018-09-08 DIAGNOSIS — Q61.3 POLYCYSTIC KIDNEY: ICD-10-CM

## 2018-09-08 LAB — CREAT UR-MCNC: 0.5 MG/DL (ref 0.6–1.3)

## 2018-09-08 PROCEDURE — 74011636320 HC RX REV CODE- 636/320: Performed by: INTERNAL MEDICINE

## 2018-09-08 PROCEDURE — 74178 CT ABD&PLV WO CNTR FLWD CNTR: CPT

## 2018-09-08 PROCEDURE — 82565 ASSAY OF CREATININE: CPT

## 2018-09-08 RX ADMIN — IOPAMIDOL 100 ML: 612 INJECTION, SOLUTION INTRAVENOUS at 13:00

## 2018-09-26 ENCOUNTER — HOSPITAL ENCOUNTER (OUTPATIENT)
Dept: LAB | Age: 43
Discharge: HOME | End: 2018-09-26

## 2018-09-26 ENCOUNTER — HOSPITAL ENCOUNTER (OUTPATIENT)
Dept: LAB | Age: 43
Discharge: HOME | End: 2018-09-26
Payer: COMMERCIAL

## 2018-09-26 DIAGNOSIS — E11.9 DIABETES MELLITUS WITHOUT COMPLICATION (HCC): ICD-10-CM

## 2018-09-26 LAB
ATRIAL RATE: 83 BPM
CALCULATED P AXIS, ECG09: 25 DEGREES
CALCULATED R AXIS, ECG10: -10 DEGREES
CALCULATED T AXIS, ECG11: 12 DEGREES
DIAGNOSIS, 93000: NORMAL
P-R INTERVAL, ECG05: 150 MS
Q-T INTERVAL, ECG07: 374 MS
QRS DURATION, ECG06: 84 MS
QTC CALCULATION (BEZET), ECG08: 439 MS
VENTRICULAR RATE, ECG03: 83 BPM
XX-LABCORP SPECIMEN COL,LCBCF: NORMAL

## 2018-09-26 PROCEDURE — 99001 SPECIMEN HANDLING PT-LAB: CPT | Performed by: ORTHOPAEDIC SURGERY

## 2018-09-26 PROCEDURE — 93005 ELECTROCARDIOGRAM TRACING: CPT

## 2018-09-27 DIAGNOSIS — E55.9 HYPOVITAMINOSIS D: ICD-10-CM

## 2018-09-27 RX ORDER — ERGOCALCIFEROL 1.25 MG/1
CAPSULE ORAL
Qty: 12 CAP | Refills: 0 | Status: SHIPPED | OUTPATIENT
Start: 2018-09-27 | End: 2019-01-05 | Stop reason: SDUPTHER

## 2018-10-26 RX ORDER — SIMVASTATIN 10 MG/1
TABLET, FILM COATED ORAL
Qty: 90 TAB | Refills: 1 | Status: SHIPPED | OUTPATIENT
Start: 2018-10-26 | End: 2019-03-20 | Stop reason: ALTCHOICE

## 2018-10-26 RX ORDER — GLIPIZIDE 10 MG/1
TABLET ORAL
Qty: 180 TAB | Refills: 1 | Status: SHIPPED | OUTPATIENT
Start: 2018-10-26 | End: 2019-03-19 | Stop reason: SDUPTHER

## 2018-12-11 ENCOUNTER — HOSPITAL ENCOUNTER (OUTPATIENT)
Dept: GENERAL RADIOLOGY | Age: 43
Discharge: HOME | End: 2018-12-11
Payer: COMMERCIAL

## 2018-12-11 ENCOUNTER — OFFICE VISIT (OUTPATIENT)
Dept: FAMILY MEDICINE CLINIC | Age: 43
End: 2018-12-11

## 2018-12-11 VITALS
SYSTOLIC BLOOD PRESSURE: 138 MMHG | DIASTOLIC BLOOD PRESSURE: 85 MMHG | RESPIRATION RATE: 16 BRPM | WEIGHT: 225 LBS | TEMPERATURE: 98.1 F | BODY MASS INDEX: 36.16 KG/M2 | HEART RATE: 92 BPM | OXYGEN SATURATION: 97 % | HEIGHT: 66 IN

## 2018-12-11 DIAGNOSIS — R05.9 COUGH: ICD-10-CM

## 2018-12-11 DIAGNOSIS — J22 LOWER RESPIRATORY INFECTION (E.G., BRONCHITIS, PNEUMONIA, PNEUMONITIS, PULMONITIS): Primary | ICD-10-CM

## 2018-12-11 PROCEDURE — 71046 X-RAY EXAM CHEST 2 VIEWS: CPT

## 2018-12-11 RX ORDER — LANCETS
EACH MISCELLANEOUS
Qty: 200 EACH | Refills: 11 | Status: SHIPPED | OUTPATIENT
Start: 2018-12-11 | End: 2020-01-31 | Stop reason: SDUPTHER

## 2018-12-11 RX ORDER — LOSARTAN POTASSIUM 100 MG/1
100 TABLET ORAL DAILY
COMMUNITY
Start: 2018-12-05 | End: 2019-03-20 | Stop reason: ALTCHOICE

## 2018-12-11 RX ORDER — DOXYCYCLINE 100 MG/1
100 TABLET ORAL 2 TIMES DAILY
Qty: 20 TAB | Refills: 0 | Status: SHIPPED | OUTPATIENT
Start: 2018-12-11 | End: 2018-12-21

## 2018-12-11 RX ORDER — PROMETHAZINE HYDROCHLORIDE AND CODEINE PHOSPHATE 6.25; 1 MG/5ML; MG/5ML
5 SOLUTION ORAL
Qty: 120 ML | Refills: 0 | Status: SHIPPED | OUTPATIENT
Start: 2018-12-11 | End: 2019-02-28 | Stop reason: ALTCHOICE

## 2018-12-11 NOTE — PATIENT INSTRUCTIONS
Cough: Care Instructions  Your Care Instructions    A cough is your body's response to something that bothers your throat or airways. Many things can cause a cough. You might cough because of a cold or the flu, bronchitis, or asthma. Smoking, postnasal drip, allergies, and stomach acid that backs up into your throat also can cause coughs. A cough is a symptom, not a disease. Most coughs stop when the cause, such as a cold, goes away. You can take a few steps at home to cough less and feel better. Follow-up care is a key part of your treatment and safety. Be sure to make and go to all appointments, and call your doctor if you are having problems. It's also a good idea to know your test results and keep a list of the medicines you take. How can you care for yourself at home? · Drink lots of water and other fluids. This helps thin the mucus and soothes a dry or sore throat. Honey or lemon juice in hot water or tea may ease a dry cough. · Take cough medicine as directed by your doctor. · Prop up your head on pillows to help you breathe and ease a dry cough. · Try cough drops to soothe a dry or sore throat. Cough drops don't stop a cough. Medicine-flavored cough drops are no better than candy-flavored drops or hard candy. · Do not smoke. Avoid secondhand smoke. If you need help quitting, talk to your doctor about stop-smoking programs and medicines. These can increase your chances of quitting for good. When should you call for help? Call 911 anytime you think you may need emergency care.  For example, call if:    · You have severe trouble breathing.    Call your doctor now or seek immediate medical care if:    · You cough up blood.     · You have new or worse trouble breathing.     · You have a new or higher fever.     · You have a new rash.    Watch closely for changes in your health, and be sure to contact your doctor if:    · You cough more deeply or more often, especially if you notice more mucus or a change in the color of your mucus.     · You have new symptoms, such as a sore throat, an earache, or sinus pain.     · You do not get better as expected. Where can you learn more? Go to http://[employer].info/. Enter D279 in the search box to learn more about \"Cough: Care Instructions. \"  Current as of: December 6, 2017  Content Version: 11.8  © 7726-8952 [employer]. Care instructions adapted under license by [employer] (which disclaims liability or warranty for this information). If you have questions about a medical condition or this instruction, always ask your healthcare professional. [address] any warranty or liability for your use of this information.

## 2018-12-11 NOTE — PROGRESS NOTES
HISTORY OF PRESENT ILLNESS  Benja Yee is a 44 y.o. female. Cough   The history is provided by the patient. This is a new problem. Episode onset: 2 weeks. The problem has not changed since onset. Pertinent negatives include no chest pain and no headaches. Nothing relieves the symptoms. Treatments tried: mucinex. The treatment provided mild relief. Allergies   Allergen Reactions    Lisinopril Cough     Current Outpatient Medications   Medication Sig Dispense Refill    losartan (COZAAR) 100 mg tablet Take 100 mg by mouth daily.  glipiZIDE (GLUCOTROL) 10 mg tablet TAKE 1 TABLET TWICE A  Tab 1    simvastatin (ZOCOR) 10 mg tablet TAKE 1 TABLET EVERY NIGHT 90 Tab 1    SITagliptin (JANUVIA) 100 mg tablet TAKE 1 TABLET DAILY 90 Tab 1    ergocalciferol (ERGOCALCIFEROL) 50,000 unit capsule TAKE ONE CAPSULE BY MOUTH EVERY 7 DAYS 12 Cap 0    Blood-Glucose Meter (ONETOUCH VERIO FLEX START) monitoring kit Check glucose 3 times daily 1 Kit 1    glucose blood VI test strips (ONETOUCH VERIO) strip To use with verio meter. Check glucose 3 times per day 100 Strip 11    dextroamphetamine-amphetamine (ADDERALL) 20 mg tablet Take 1 Tab (20 mg total) by mouth two (2) times a day. Max Daily Amount: 40 mg 60 Tab 0    Lancets misc To use with one touch mini delica device. Check glucose 3 times daily 1 Package 11    Lancets misc Blood sugar check  Each 11    losartan (COZAAR) 50 mg tablet TAKE 1 TABLET BY MOUTH DAILY 90 Tab 1    Insulin Needles, Disposable, (REBEKAH PEN NEEDLE) 32 gauge x 5/32\" ndle To be used once daily with [employer] FlexTouch Pen. 100 Pen Needle 3    insulin degludec (TRESIBA FLEXTOUCH U-200) 200 unit/mL (3 mL) inpn 10 Units by SubCUTAneous route daily. 1 Adjustable Dose Pre-filled Pen Syringe 3    butalbital-acetaminophen-caffeine (FIORICET, ESGIC) -40 mg per tablet Take 1 Tab by mouth every six (6) hours as needed for Pain. Max Daily Amount: 4 Tabs.  60 Tab 0    hydrochlorothiazide (HYDRODIURIL) 25 mg tablet Take 1 Tab by mouth daily as needed. 80 Tab 3     Past Medical History:   Diagnosis Date    ADHD (attention deficit hyperactivity disorder)     Diabetes ([facility] [address]     Diabetes mellitus, type II ([facility]     History of echocardiogram 2015    EF 60-65%. No RWMA. Mild conc LVH. No significant valvular heart disease.  History of uterine fibroid     Holter monitor, normal 2015    Sinus rhythm, avg HR 99 bpm (range  bpm). Benign Holter study.  Hyperlipidemia     LLE venous duplex 2015    Left leg:  No DVT.      Social History     Socioeconomic History    Marital status:      Spouse name: Not on file    Number of children: Not on file    Years of education: Not on file    Highest education level: Not on file   Social Needs    Financial resource strain: Not on file    Food insecurity - worry: Not on file    Food insecurity - inability: Not on file    Transportation needs - medical: Not on file   [employer] needs - non-medical: Not on file   Occupational History    Not on file   Tobacco Use    Smoking status: Former Smoker     Last attempt to quit: 2000     Years since quittin.9    Smokeless tobacco: Never Used   Substance and Sexual Activity    Alcohol use: Yes     Comment: socially    Drug use: No    Sexual activity: Yes     Partners: Male     Birth control/protection: None     Comment:    Other Topics Concern     Service Not Asked    Blood Transfusions Not Asked    Caffeine Concern Yes     Comment: 1 cup a day    Occupational Exposure Not Asked    Hobby Hazards Not Asked    Sleep Concern Not Asked    Stress Concern Not Asked    Weight Concern Not Asked    Special Diet Not Asked    Back Care Not Asked    Exercise No    Bike Helmet Not Asked    Seat Belt Yes    Self-Exams Not Asked   Social History Narrative    ** Merged History Encounter **          Review of Systems Constitutional: Negative for chills and fever. HENT: Negative for congestion, ear pain, sinus pain and sore throat. Respiratory: Positive for cough (worse at night ). Negative for sputum production and wheezing. Pmh of bronchitis     Cardiovascular: Negative for chest pain. Neurological: Negative for headaches. /85 (BP 1 Location: Right arm)   Pulse 92   Temp 98.1 °F (36.7 °C) (Oral)   Resp 16   Ht 5' 6\" (1.676 m)   Wt 225 lb (102.1 kg)   LMP 01/01/2007   SpO2 97%   BMI 36.32 kg/m²   Physical Exam   Constitutional: She appears well-developed and well-nourished. No distress. HENT:   Head: Normocephalic and atraumatic. Right Ear: A middle ear effusion is present. Left Ear: A middle ear effusion is present. Nose: Mucosal edema present. Right sinus exhibits no maxillary sinus tenderness and no frontal sinus tenderness. Left sinus exhibits no maxillary sinus tenderness and no frontal sinus tenderness. Mouth/Throat: Uvula is midline, oropharynx is clear and moist and mucous membranes are normal.   Neck: Normal range of motion. Neck supple. Cardiovascular: Normal rate, regular rhythm and normal heart sounds. Exam reveals no gallop and no friction rub. No murmur heard. Pulmonary/Chest: Effort normal. She has decreased breath sounds in the left lower field. She has no wheezes. She has no rhonchi. She has no rales. Lymphadenopathy:     She has no cervical adenopathy. ASSESSMENT and PLAN    ICD-10-CM ICD-9-CM    1. Lower respiratory infection (e.g., bronchitis, pneumonia, pneumonitis, pulmonitis) J22 519.8 promethazine-codeine (PHENERGAN WITH CODEINE) 6.25-10 mg/5 mL syrup      doxycycline (ADOXA) 100 mg tablet   2. Cough R05 786.2 XR CHEST PA LAT   I have discussed the diagnosis with the patient and the intended plan as seen in the above orders. The patient has received an after-visit summary and questions were answered concerning future plans.   I have discussed medication side effects and warnings with the patient as well. Patient agreeable with above plan and verbalizes understanding. Follow-up Disposition:  Return if symptoms worsen or fail to improve.

## 2018-12-11 NOTE — PROGRESS NOTES
Pt is here for cough almost, nonproductive. 1. Have you been to the ER, urgent care clinic since your last visit? Hospitalized since your last visit? No    2. Have you seen or consulted any other health care providers outside of the [address] [place] [address] since your last visit? Include any pap smears or colon screening.  Yes Dr Julia Medellin 10/18

## 2019-01-05 DIAGNOSIS — E55.9 HYPOVITAMINOSIS D: ICD-10-CM

## 2019-01-11 RX ORDER — ERGOCALCIFEROL 1.25 MG/1
CAPSULE ORAL
Qty: 12 CAP | Refills: 0 | Status: SHIPPED | OUTPATIENT
Start: 2019-01-11 | End: 2019-04-14 | Stop reason: SDUPTHER

## 2019-01-24 ENCOUNTER — APPOINTMENT (OUTPATIENT)
Dept: CT IMAGING | Age: 44
End: 2019-01-24
Attending: EMERGENCY MEDICINE
Payer: COMMERCIAL

## 2019-01-24 ENCOUNTER — HOSPITAL ENCOUNTER (EMERGENCY)
Age: 44
Discharge: HOME | End: 2019-01-24
Attending: EMERGENCY MEDICINE
Payer: COMMERCIAL

## 2019-01-24 VITALS
HEIGHT: 66 IN | SYSTOLIC BLOOD PRESSURE: 161 MMHG | BODY MASS INDEX: 36.96 KG/M2 | HEART RATE: 96 BPM | TEMPERATURE: 98.2 F | WEIGHT: 230 LBS | DIASTOLIC BLOOD PRESSURE: 98 MMHG | OXYGEN SATURATION: 97 % | RESPIRATION RATE: 19 BRPM

## 2019-01-24 DIAGNOSIS — G43.009 MIGRAINE WITHOUT AURA AND WITHOUT STATUS MIGRAINOSUS, NOT INTRACTABLE: Primary | ICD-10-CM

## 2019-01-24 DIAGNOSIS — R53.83 FATIGUE, UNSPECIFIED TYPE: ICD-10-CM

## 2019-01-24 LAB
ALBUMIN SERPL-MCNC: 2.2 G/DL (ref 3.4–5)
ALBUMIN/GLOB SERPL: 0.5 {RATIO} (ref 0.8–1.7)
ALP SERPL-CCNC: 61 U/L (ref 45–117)
ALT SERPL-CCNC: 20 U/L (ref 13–56)
ANION GAP SERPL CALC-SCNC: 9 MMOL/L (ref 3–18)
APPEARANCE UR: CLEAR
AST SERPL-CCNC: 22 U/L (ref 15–37)
ATRIAL RATE: 92 BPM
BASOPHILS # BLD: 0 K/UL (ref 0–0.1)
BASOPHILS NFR BLD: 0 % (ref 0–2)
BILIRUB SERPL-MCNC: 0.2 MG/DL (ref 0.2–1)
BILIRUB UR QL: NEGATIVE
BUN SERPL-MCNC: 19 MG/DL (ref 7–18)
BUN/CREAT SERPL: 32 (ref 12–20)
CALCIUM SERPL-MCNC: 8.9 MG/DL (ref 8.5–10.1)
CALCULATED P AXIS, ECG09: 33 DEGREES
CALCULATED R AXIS, ECG10: 0 DEGREES
CALCULATED T AXIS, ECG11: 9 DEGREES
CHLORIDE SERPL-SCNC: 101 MMOL/L (ref 100–108)
CK MB CFR SERPL CALC: NORMAL % (ref 0–4)
CK MB SERPL-MCNC: <1 NG/ML (ref 5–25)
CK SERPL-CCNC: 54 U/L (ref 26–192)
CO2 SERPL-SCNC: 26 MMOL/L (ref 21–32)
COLOR UR: YELLOW
CREAT SERPL-MCNC: 0.59 MG/DL (ref 0.6–1.3)
DIAGNOSIS, 93000: NORMAL
DIFFERENTIAL METHOD BLD: ABNORMAL
EOSINOPHIL # BLD: 0.1 K/UL (ref 0–0.4)
EOSINOPHIL NFR BLD: 2 % (ref 0–5)
EPITH CASTS URNS QL MICRO: NORMAL /LPF (ref 0–5)
ERYTHROCYTE [DISTWIDTH] IN BLOOD BY AUTOMATED COUNT: 14.6 % (ref 11.6–14.5)
GLOBULIN SER CALC-MCNC: 4.2 G/DL (ref 2–4)
GLUCOSE SERPL-MCNC: 250 MG/DL (ref 74–99)
GLUCOSE UR STRIP.AUTO-MCNC: 500 MG/DL
HCT VFR BLD AUTO: 37.1 % (ref 35–45)
HGB BLD-MCNC: 12 G/DL (ref 12–16)
HGB UR QL STRIP: ABNORMAL
KETONES UR QL STRIP.AUTO: NEGATIVE MG/DL
LEUKOCYTE ESTERASE UR QL STRIP.AUTO: NEGATIVE
LYMPHOCYTES # BLD: 2.9 K/UL (ref 0.9–3.6)
LYMPHOCYTES NFR BLD: 36 % (ref 21–52)
MCH RBC QN AUTO: 25.5 PG (ref 24–34)
MCHC RBC AUTO-ENTMCNC: 32.3 G/DL (ref 31–37)
MCV RBC AUTO: 78.9 FL (ref 74–97)
MONOCYTES # BLD: 0.5 K/UL (ref 0.05–1.2)
MONOCYTES NFR BLD: 6 % (ref 3–10)
NEUTS SEG # BLD: 4.7 K/UL (ref 1.8–8)
NEUTS SEG NFR BLD: 56 % (ref 40–73)
NITRITE UR QL STRIP.AUTO: NEGATIVE
P-R INTERVAL, ECG05: 148 MS
PH UR STRIP: 6 [PH] (ref 5–8)
PLATELET # BLD AUTO: 284 K/UL (ref 135–420)
PMV BLD AUTO: 9.5 FL (ref 9.2–11.8)
POTASSIUM SERPL-SCNC: 3.9 MMOL/L (ref 3.5–5.5)
PROT SERPL-MCNC: 6.4 G/DL (ref 6.4–8.2)
PROT UR STRIP-MCNC: >1000 MG/DL
Q-T INTERVAL, ECG07: 370 MS
QRS DURATION, ECG06: 86 MS
QTC CALCULATION (BEZET), ECG08: 457 MS
RBC # BLD AUTO: 4.7 M/UL (ref 4.2–5.3)
RBC #/AREA URNS HPF: NORMAL /HPF (ref 0–5)
SODIUM SERPL-SCNC: 136 MMOL/L (ref 136–145)
SP GR UR REFRACTOMETRY: 1.02 (ref 1–1.03)
TROPONIN I SERPL-MCNC: <0.02 NG/ML (ref 0–0.06)
UROBILINOGEN UR QL STRIP.AUTO: 0.2 EU/DL (ref 0.2–1)
VENTRICULAR RATE, ECG03: 92 BPM
WBC # BLD AUTO: 8.2 K/UL (ref 4.6–13.2)
WBC URNS QL MICRO: NORMAL /HPF (ref 0–4)

## 2019-01-24 PROCEDURE — 96374 THER/PROPH/DIAG INJ IV PUSH: CPT

## 2019-01-24 PROCEDURE — 80053 COMPREHEN METABOLIC PANEL: CPT

## 2019-01-24 PROCEDURE — 74011250636 HC RX REV CODE- 250/636: Performed by: EMERGENCY MEDICINE

## 2019-01-24 PROCEDURE — 93005 ELECTROCARDIOGRAM TRACING: CPT

## 2019-01-24 PROCEDURE — 85025 COMPLETE CBC W/AUTO DIFF WBC: CPT

## 2019-01-24 PROCEDURE — 81001 URINALYSIS AUTO W/SCOPE: CPT

## 2019-01-24 PROCEDURE — 82550 ASSAY OF CK (CPK): CPT

## 2019-01-24 PROCEDURE — 96375 TX/PRO/DX INJ NEW DRUG ADDON: CPT

## 2019-01-24 PROCEDURE — 96361 HYDRATE IV INFUSION ADD-ON: CPT

## 2019-01-24 PROCEDURE — 99283 EMERGENCY DEPT VISIT LOW MDM: CPT

## 2019-01-24 RX ORDER — MECLIZINE HCL 12.5 MG 12.5 MG/1
50 TABLET ORAL
Status: DISCONTINUED | OUTPATIENT
Start: 2019-01-24 | End: 2019-01-24

## 2019-01-24 RX ORDER — KETOROLAC TROMETHAMINE 30 MG/ML
30 INJECTION, SOLUTION INTRAMUSCULAR; INTRAVENOUS
Status: COMPLETED | OUTPATIENT
Start: 2019-01-24 | End: 2019-01-24

## 2019-01-24 RX ORDER — KETOROLAC TROMETHAMINE 30 MG/ML
30 INJECTION, SOLUTION INTRAMUSCULAR; INTRAVENOUS
Status: DISCONTINUED | OUTPATIENT
Start: 2019-01-24 | End: 2019-01-24

## 2019-01-24 RX ORDER — DIPHENHYDRAMINE HYDROCHLORIDE 50 MG/ML
50 INJECTION, SOLUTION INTRAMUSCULAR; INTRAVENOUS ONCE
Status: COMPLETED | OUTPATIENT
Start: 2019-01-24 | End: 2019-01-24

## 2019-01-24 RX ORDER — METOCLOPRAMIDE HYDROCHLORIDE 5 MG/ML
10 INJECTION INTRAMUSCULAR; INTRAVENOUS
Status: COMPLETED | OUTPATIENT
Start: 2019-01-24 | End: 2019-01-24

## 2019-01-24 RX ORDER — ONDANSETRON 2 MG/ML
4 INJECTION INTRAMUSCULAR; INTRAVENOUS
Status: DISCONTINUED | OUTPATIENT
Start: 2019-01-24 | End: 2019-01-24

## 2019-01-24 RX ORDER — DIAZEPAM 5 MG/1
10 TABLET ORAL
Status: DISCONTINUED | OUTPATIENT
Start: 2019-01-24 | End: 2019-01-24

## 2019-01-24 RX ADMIN — DIPHENHYDRAMINE HYDROCHLORIDE 50 MG: 50 INJECTION INTRAMUSCULAR; INTRAVENOUS at 10:59

## 2019-01-24 RX ADMIN — METOCLOPRAMIDE 10 MG: 5 INJECTION, SOLUTION INTRAMUSCULAR; INTRAVENOUS at 10:59

## 2019-01-24 RX ADMIN — SODIUM CHLORIDE 1000 ML: 900 INJECTION, SOLUTION INTRAVENOUS at 10:59

## 2019-01-24 RX ADMIN — KETOROLAC TROMETHAMINE 30 MG: 30 INJECTION, SOLUTION INTRAMUSCULAR; INTRAVENOUS at 12:52

## 2019-01-24 NOTE — ED PROVIDER NOTES
EMERGENCY DEPARTMENT HISTORY AND PHYSICAL EXAM 
 
Date: 1/24/2019 Patient Name: Marylee Franklin History of Presenting Illness Chief Complaint Patient presents with  
 Headache  Nausea  Fatigue History Provided By: Patient Chief Complaint: headache Duration: 1 Days Timing:  Acute Location: neuro, abd 
Quality: dizziness Severity: Moderate Modifying Factors: h/o migraines Associated Symptoms: fatigue, photophobia, nausea Additional History (Context): Marylee Franklin is a 44 y.o. female with diabetes, hypertension, obesity and migraines, ADHD who presents with headache since @2am.  H/o migraines. Also c/o photophobia, nausea, fatigue. VALE feels like one of her bad migraines. Prior hysterectomy. Took Tylenol w/o relief at 4:30a. PCP: Jeffery Corbett MD 
 
Current Facility-Administered Medications Medication Dose Route Frequency Provider Last Rate Last Dose  ketorolac (TORADOL) injection 30 mg  30 mg IntraVENous NOW Marge Lambert PA Current Outpatient Medications Medication Sig Dispense Refill  ergocalciferol (ERGOCALCIFEROL) 50,000 unit capsule TAKE ONE CAPSULE BY MOUTH EVERY 7 DAYS 12 Cap 0  
 losartan (COZAAR) 100 mg tablet Take 100 mg by mouth daily.  glucose blood VI test strips (ONETOUCH VERIO) strip To use with verio meter. Check glucose 3 times per day 100 Strip 11  
 lancets misc Blood sugar check  Each 11  
 glipiZIDE (GLUCOTROL) 10 mg tablet TAKE 1 TABLET TWICE A  Tab 1  
 simvastatin (ZOCOR) 10 mg tablet TAKE 1 TABLET EVERY NIGHT 90 Tab 1  
 dextroamphetamine-amphetamine (ADDERALL) 20 mg tablet Take 1 Tab (20 mg total) by mouth two (2) times a day. Max Daily Amount: 40 mg 60 Tab 0  
 Lancets misc To use with one touch mini delica device. Check glucose 3 times daily 1 Package 11  Insulin Needles, Disposable, (REBEKAH PEN NEEDLE) 32 gauge x 5/32\" ndle To be used once daily with Neyda Ronal FlexTouch Pen. 100 Pen Needle 3  
 insulin degludec (TRESIBA FLEXTOUCH U-200) 200 unit/mL (3 mL) inpn 10 Units by SubCUTAneous route daily. 1 Adjustable Dose Pre-filled Pen Syringe 3  
 SITagliptin (JANUVIA) 100 mg tablet TAKE 1 TABLET DAILY 90 Tab 1  promethazine-codeine (PHENERGAN WITH CODEINE) 6.25-10 mg/5 mL syrup Take 5 mL by mouth four (4) times daily as needed for Cough. Max Daily Amount: 20 mL. 120 mL 0  Blood-Glucose Meter (ONETOUCH VERIO FLEX START) monitoring kit Check glucose 3 times daily 1 Kit 1  
 butalbital-acetaminophen-caffeine (FIORICET, ESGIC) -40 mg per tablet Take 1 Tab by mouth every six (6) hours as needed for Pain. Max Daily Amount: 4 Tabs. 60 Tab 0 Past History Past Medical History: 
Past Medical History:  
Diagnosis Date  ADHD (attention deficit hyperactivity disorder)  Diabetes ([facility] [address]  Diabetes mellitus, type II ([facility] [address]  History of echocardiogram 2015 EF 60-65%. No RWMA. Mild conc LVH. No significant valvular heart disease.  History of uterine fibroid  Holter monitor, normal 2015 Sinus rhythm, avg HR 99 bpm (range  bpm). Benign Holter study.  Hyperlipidemia  LLE venous duplex 2015 Left leg:  No DVT. Past Surgical History: 
Past Surgical History:  
Procedure Laterality Date  HX GYN    
 hysterectomy  HX HYSTERECTOMY    
   HX ORTHOPAEDIC  2014  
 knee  HX WISDOM TEETH EXTRACTION Family History: 
Family History Problem Relation Age of Onset  Diabetes Other  Heart Disease Other  Hypertension Other  Hypertension Mother  Diabetes Mother  High Cholesterol Mother  Diabetes Father  Hypertension Father  High Cholesterol Father Social History: 
Social History Tobacco Use  Smoking status: Former Smoker Last attempt to quit: 2000 Years since quittin.0  Smokeless tobacco: Never Used Substance Use Topics  Alcohol use: Yes Comment: socially  Drug use: No  
 
 
Allergies: Allergies Allergen Reactions  Lisinopril Cough Review of Systems Review of Systems Constitutional: Positive for fatigue. Negative for fever. Eyes: Positive for photophobia. Gastrointestinal: Positive for nausea. Negative for vomiting. Neurological: Positive for headaches. Negative for dizziness, tremors, seizures, syncope, facial asymmetry, speech difficulty, weakness, light-headedness and numbness. All other systems reviewed and are negative. All Other Systems Negative Physical Exam  
 
Vitals:  
 01/24/19 1006 BP: (!) 161/98 Pulse: 96  
Resp: 19 Temp: 98.2 °F (36.8 °C) SpO2: 97% Weight: 104.3 kg (230 lb) Height: 5' 6\" (1.676 m) Physical Exam  
Constitutional: She is oriented to person, place, and time. She appears well-developed. HENT:  
Head: Normocephalic and atraumatic. Eyes: EOM are normal. Pupils are equal, round, and reactive to light. No nystagmus. Neck: No JVD present. No tracheal deviation present. No thyromegaly present. Cardiovascular: Normal rate, regular rhythm and normal heart sounds. Exam reveals no gallop and no friction rub. No murmur heard. Pulmonary/Chest: Effort normal and breath sounds normal. No stridor. No respiratory distress. She has no wheezes. She has no rales. She exhibits no tenderness. Abdominal: Soft. She exhibits no distension and no mass. There is no tenderness. There is no rebound and no guarding. Musculoskeletal: She exhibits no edema or tenderness. Lymphadenopathy:  
  She has no cervical adenopathy. Neurological: She is alert and oriented to person, place, and time. No cranial nerve deficit. Coordination normal.  
Negative Romberg. No tremor. Skin: Skin is warm and dry. No rash noted. No erythema. No pallor. Psychiatric: She has a normal mood and affect.  Her behavior is normal. Thought content normal.  
Nursing note and vitals reviewed. Diagnostic Study Results Labs - Recent Results (from the past 12 hour(s)) CBC WITH AUTOMATED DIFF Collection Time: 01/24/19 11:22 AM  
Result Value Ref Range WBC 8.2 4.6 - 13.2 K/uL  
 RBC 4.70 4.20 - 5.30 M/uL  
 HGB 12.0 12.0 - 16.0 g/dL HCT 37.1 35.0 - 45.0 % MCV 78.9 74.0 - 97.0 FL  
 MCH 25.5 24.0 - 34.0 PG  
 MCHC 32.3 31.0 - 37.0 g/dL  
 RDW 14.6 (H) 11.6 - 14.5 % PLATELET 227 285 - 789 K/uL MPV 9.5 9.2 - 11.8 FL  
 NEUTROPHILS 56 40 - 73 % LYMPHOCYTES 36 21 - 52 % MONOCYTES 6 3 - 10 % EOSINOPHILS 2 0 - 5 % BASOPHILS 0 0 - 2 %  
 ABS. NEUTROPHILS 4.7 1.8 - 8.0 K/UL  
 ABS. LYMPHOCYTES 2.9 0.9 - 3.6 K/UL  
 ABS. MONOCYTES 0.5 0.05 - 1.2 K/UL  
 ABS. EOSINOPHILS 0.1 0.0 - 0.4 K/UL  
 ABS. BASOPHILS 0.0 0.0 - 0.1 K/UL  
 DF AUTOMATED METABOLIC PANEL, COMPREHENSIVE Collection Time: 01/24/19 11:22 AM  
Result Value Ref Range Sodium 136 136 - 145 mmol/L Potassium 3.9 3.5 - 5.5 mmol/L Chloride 101 100 - 108 mmol/L  
 CO2 26 21 - 32 mmol/L Anion gap 9 3.0 - 18 mmol/L Glucose 250 (H) 74 - 99 mg/dL BUN 19 (H) 7.0 - 18 MG/DL Creatinine 0.59 (L) 0.6 - 1.3 MG/DL  
 BUN/Creatinine ratio 32 (H) 12 - 20 GFR est AA >60 >60 ml/min/1.73m2 GFR est non-AA >60 >60 ml/min/1.73m2 Calcium 8.9 8.5 - 10.1 MG/DL Bilirubin, total 0.2 0.2 - 1.0 MG/DL  
 ALT (SGPT) 20 13 - 56 U/L  
 AST (SGOT) 22 15 - 37 U/L Alk. phosphatase 61 45 - 117 U/L Protein, total 6.4 6.4 - 8.2 g/dL Albumin 2.2 (L) 3.4 - 5.0 g/dL Globulin 4.2 (H) 2.0 - 4.0 g/dL A-G Ratio 0.5 (L) 0.8 - 1.7 CARDIAC PANEL,(CK, CKMB & TROPONIN) Collection Time: 01/24/19 11:22 AM  
Result Value Ref Range CK 54 26 - 192 U/L  
 CK - MB <1.0 <3.6 ng/ml CK-MB Index  0.0 - 4.0 % CALCULATION NOT PERFORMED WHEN RESULT IS BELOW LINEAR LIMIT  Troponin-I, QT <0.02 0.00 - 0.06 NG/ML  
URINALYSIS W/ RFLX MICROSCOPIC  
 Collection Time: 01/24/19 11:25 AM  
Result Value Ref Range Color YELLOW Appearance CLEAR Specific gravity 1.019 1.005 - 1.030    
 pH (UA) 6.0 5.0 - 8.0 Protein >1,000 (A) NEG mg/dL Glucose 500 (A) NEG mg/dL Ketone NEGATIVE  NEG mg/dL Bilirubin NEGATIVE  NEG Blood TRACE (A) NEG Urobilinogen 0.2 0.2 - 1.0 EU/dL Nitrites NEGATIVE  NEG Leukocyte Esterase NEGATIVE  NEG    
URINE MICROSCOPIC ONLY Collection Time: 01/24/19 11:25 AM  
Result Value Ref Range WBC NONE 0 - 4 /hpf  
 RBC 0 to 3 0 - 5 /hpf Epithelial cells 3+ 0 - 5 /lpf Radiologic Studies - No orders to display CT Results  (Last 48 hours) None CXR Results  (Last 48 hours) None Medical Decision Making I am the first provider for this patient. I reviewed the vital signs, available nursing notes, past medical history, past surgical history, family history and social history. Vital Signs-Reviewed the patient's vital signs. Records Reviewed: Nursing Notes Procedures: 
Procedures Provider Notes (Medical Decision Making): treat migraine. Feeling much improved. Ready for discharge. MED RECONCILIATION: 
Current Facility-Administered Medications Medication Dose Route Frequency  ketorolac (TORADOL) injection 30 mg  30 mg IntraVENous NOW Current Outpatient Medications Medication Sig  ergocalciferol (ERGOCALCIFEROL) 50,000 unit capsule TAKE ONE CAPSULE BY MOUTH EVERY 7 DAYS  losartan (COZAAR) 100 mg tablet Take 100 mg by mouth daily.  glucose blood VI test strips (ONETOUCH VERIO) strip To use with verio meter. Check glucose 3 times per day  lancets misc Blood sugar check TID  glipiZIDE (GLUCOTROL) 10 mg tablet TAKE 1 TABLET TWICE A DAY  simvastatin (ZOCOR) 10 mg tablet TAKE 1 TABLET EVERY NIGHT  dextroamphetamine-amphetamine (ADDERALL) 20 mg tablet Take 1 Tab (20 mg total) by mouth two (2) times a day. Max Daily Amount: 40 mg  Lancets misc To use with one touch mini delica device. Check glucose 3 times daily  Insulin Needles, Disposable, (REBEKAH PEN NEEDLE) 32 gauge x 5/32\" ndle To be used once daily with [place] FlexTouch Pen.  insulin degludec (TRESIBA FLEXTOUCH U-200) 200 unit/mL (3 mL) inpn 10 Units by SubCUTAneous route daily.  SITagliptin (JANUVIA) 100 mg tablet TAKE 1 TABLET DAILY  promethazine-codeine (PHENERGAN WITH CODEINE) 6.25-10 mg/5 mL syrup Take 5 mL by mouth four (4) times daily as needed for Cough. Max Daily Amount: 20 mL.  Blood-Glucose Meter (ONETOUCH VERIO FLEX START) monitoring kit Check glucose 3 times daily  butalbital-acetaminophen-caffeine (FIORICET, ESGIC) -40 mg per tablet Take 1 Tab by mouth every six (6) hours as needed for Pain. Max Daily Amount: 4 Tabs. Disposition: 
home DISCHARGE NOTE:  
12:26 PM 
 
Pt has been reexamined. Patient has no new complaints, changes, or physical findings. Care plan outlined and precautions discussed. Results of labs were reviewed with the patient. All medications were reviewed with the patient; will d/c home with reassurance. All of pt's questions and concerns were addressed. Patient was instructed and agrees to follow up with PCP, as well as to return to the ED upon further deterioration. Patient is ready to go home. Follow-up Information Follow up With Specialties Details Why Contact Info Padmini Ureña MD Family Practice Schedule an appointment as soon as possible for a visit in 1 day  [address] [facility] [address] [facility] [address] [phone] [id number] [facility] EMERGENCY DEPT Emergency Medicine  If symptoms worsen return immediately [id number] [facility] [id number] [phone] Current Discharge Medication List  
  
 
 
 
Diagnosis Clinical Impression: 1. Migraine without aura and without status migrainosus, not intractable 2. Fatigue, unspecified type

## 2019-01-24 NOTE — ED NOTES
Yasmine Larson is a 44 y.o. female that was discharged in stable condition. The patients diagnosis, condition and treatment were explained to  patient and aftercare instructions were given. The patient verbalized understanding. Patient armband removed and shredded.

## 2019-01-24 NOTE — DISCHARGE INSTRUCTIONS
Patient Education        Fatigue: Care Instructions  Your Care Instructions    Fatigue is a feeling of tiredness, exhaustion, or lack of energy. You may feel fatigue because of too much or not enough activity. It can also come from stress, lack of sleep, boredom, and poor diet. Many medical problems, such as viral infections, can cause fatigue. Emotional problems, especially depression, are often the cause of fatigue. Fatigue is most often a symptom of another problem. Treatment for fatigue depends on the cause. For example, if you have fatigue because you have a certain health problem, treating this problem also treats your fatigue. If depression or anxiety is the cause, treatment may help. Follow-up care is a key part of your treatment and safety. Be sure to make and go to all appointments, and call your doctor if you are having problems. It's also a good idea to know your test results and keep a list of the medicines you take. How can you care for yourself at home? · Get regular exercise. But don't overdo it. Go back and forth between rest and exercise. · Get plenty of rest.  · Eat a healthy diet. Do not skip meals, especially breakfast.  · Reduce your use of caffeine, tobacco, and alcohol. Caffeine is most often found in coffee, tea, cola drinks, and chocolate. · Limit medicines that can cause fatigue. This includes tranquilizers and cold and allergy medicines. When should you call for help? Watch closely for changes in your health, and be sure to contact your doctor if:    · You have new symptoms such as fever or a rash.     · Your fatigue gets worse.     · You have been feeling down, depressed, or hopeless. Or you may have lost interest in things that you usually enjoy.     · You are not getting better as expected. Where can you learn more? Go to http://jakob-niko.info/. Enter F191 in the search box to learn more about \"Fatigue: Care Instructions. \"  Current as of: September 23, 2018  Content Version: 11.9  © 5772-0670 [employer]. Care instructions adapted under license by [employer] (which disclaims liability or warranty for this information). If you have questions about a medical condition or this instruction, always ask your healthcare professional. [address] any warranty or liability for your use of this information. Patient Education        Migraine Headache: Care Instructions  Your Care Instructions  Migraines are painful, throbbing headaches that often start on one side of the head. They may cause nausea and vomiting and make you sensitive to light, sound, or smell. Without treatment, migraines can last from 4 hours to a few days. Medicines can help prevent migraines or stop them after they have started. Your doctor can help you find which ones work best for you. Follow-up care is a key part of your treatment and safety. Be sure to make and go to all appointments, and call your doctor if you are having problems. It's also a good idea to know your test results and keep a list of the medicines you take. How can you care for yourself at home? · Do not drive if you have taken a prescription pain medicine. · Rest in a quiet, dark room until your headache is gone. Close your eyes, and try to relax or go to sleep. Don't watch TV or read. · Put a cold, moist cloth or cold pack on the painful area for 10 to 20 minutes at a time. Put a thin cloth between the cold pack and your skin. · Use a warm, moist towel or a heating pad set on low to relax tight shoulder and neck muscles. · Have someone gently massage your neck and shoulders. · Take your medicines exactly as prescribed. Call your doctor if you think you are having a problem with your medicine. You will get more details on the specific medicines your doctor prescribes. · Be careful not to take pain medicine more often than the instructions allow.  You could get worse or more frequent headaches when the medicine wears off. To prevent migraines  · Keep a headache diary so you can figure out what triggers your headaches. Avoiding triggers may help you prevent headaches. Record when each headache began, how long it lasted, and what the pain was like. (Was it throbbing, aching, stabbing, or dull?) Write down any other symptoms you had with the headache, such as nausea, flashing lights or dark spots, or sensitivity to bright light or loud noise. Note if the headache occurred near your period. List anything that might have triggered the headache. Triggers may include certain foods (chocolate, cheese, wine) or odors, smoke, bright light, stress, or lack of sleep. · If your doctor has prescribed medicine for your migraines, take it as directed. You may have medicine that you take only when you get a migraine and medicine that you take all the time to help prevent migraines. ? If your doctor has prescribed medicine for when you get a headache, take it at the first sign of a migraine, unless your doctor has given you other instructions. ? If your doctor has prescribed medicine to prevent migraines, take it exactly as prescribed. Call your doctor if you think you are having a problem with your medicine. · Find healthy ways to deal with stress. Migraines are most common during or right after stressful times. Take time to relax before and after you do something that has caused a migraine in the past.  · Try to keep your muscles relaxed by keeping good posture. Check your jaw, face, neck, and shoulder muscles for tension. Try to relax them. When you sit at a desk, change positions often. And make sure to stretch for 30 seconds each hour. · Get plenty of sleep and exercise. · Eat meals on a regular schedule. Avoid foods and drinks that often trigger migraines.  These include chocolate, alcohol (especially red wine and port), aspartame, monosodium glutamate (MSG), and some additives found in foods (such as hot dogs, dan, cold cuts, aged cheeses, and pickled foods). · Limit caffeine. Don't drink too much coffee, tea, or soda. But don't quit caffeine suddenly. That can also give you migraines. · Do not smoke or allow others to smoke around you. If you need help quitting, talk to your doctor about stop-smoking programs and medicines. These can increase your chances of quitting for good. · If you are taking birth control pills or hormone therapy, talk to your doctor about whether they are triggering your migraines. When should you call for help? Call 911 anytime you think you may need emergency care. For example, call if:    · You have signs of a stroke. These may include:  ? Sudden numbness, paralysis, or weakness in your face, arm, or leg, especially on only one side of your body. ? Sudden vision changes. ? Sudden trouble speaking. ? Sudden confusion or trouble understanding simple statements. ? Sudden problems with walking or balance. ? A sudden, severe headache that is different from past headaches.    Call your doctor now or seek immediate medical care if:    · You have new or worse nausea and vomiting.     · You have a new or higher fever.     · Your headache gets much worse.    Watch closely for changes in your health, and be sure to contact your doctor if:    · You are not getting better after 2 days (48 hours). Where can you learn more? Go to http://jakob-niko.info/. Enter C368 in the search box to learn more about \"Migraine Headache: Care Instructions. \"  Current as of: Izzy 3, 2018  Content Version: 11.9  © 3130-2848 [employer]. Care instructions adapted under license by [employer] (which disclaims liability or warranty for this information).  If you have questions about a medical condition or this instruction, always ask your healthcare professional. [place] [address] any warranty or liability for your use of this information.

## 2019-02-02 ENCOUNTER — HOSPITAL ENCOUNTER (OUTPATIENT)
Dept: LAB | Age: 44
Discharge: HOME | End: 2019-02-02
Payer: COMMERCIAL

## 2019-02-02 LAB
ALBUMIN SERPL-MCNC: 2.8 G/DL (ref 3.4–5)
ALBUMIN/GLOB SERPL: 0.7 {RATIO} (ref 0.8–1.7)
ALP SERPL-CCNC: 69 U/L (ref 45–117)
ALT SERPL-CCNC: 15 U/L (ref 13–56)
ANION GAP SERPL CALC-SCNC: 7 MMOL/L (ref 3–18)
AST SERPL-CCNC: 12 U/L (ref 15–37)
BILIRUB SERPL-MCNC: 0.2 MG/DL (ref 0.2–1)
BUN SERPL-MCNC: 13 MG/DL (ref 7–18)
BUN/CREAT SERPL: 22 (ref 12–20)
CALCIUM SERPL-MCNC: 8.7 MG/DL (ref 8.5–10.1)
CHLORIDE SERPL-SCNC: 106 MMOL/L (ref 100–108)
CO2 SERPL-SCNC: 25 MMOL/L (ref 21–32)
CREAT SERPL-MCNC: 0.59 MG/DL (ref 0.6–1.3)
GLOBULIN SER CALC-MCNC: 3.9 G/DL (ref 2–4)
GLUCOSE SERPL-MCNC: 193 MG/DL (ref 74–99)
POTASSIUM SERPL-SCNC: 3.7 MMOL/L (ref 3.5–5.5)
PROT SERPL-MCNC: 6.7 G/DL (ref 6.4–8.2)
SODIUM SERPL-SCNC: 138 MMOL/L (ref 136–145)

## 2019-02-02 PROCEDURE — 36415 COLL VENOUS BLD VENIPUNCTURE: CPT

## 2019-02-02 PROCEDURE — 80053 COMPREHEN METABOLIC PANEL: CPT

## 2019-02-03 DIAGNOSIS — G43.909 MIGRAINE WITHOUT STATUS MIGRAINOSUS, NOT INTRACTABLE, UNSPECIFIED MIGRAINE TYPE: ICD-10-CM

## 2019-02-03 RX ORDER — BUTALBITAL, ACETAMINOPHEN AND CAFFEINE 50; 325; 40 MG/1; MG/1; MG/1
TABLET ORAL
Qty: 60 TAB | Refills: 2 | Status: SHIPPED | OUTPATIENT
Start: 2019-02-03 | End: 2019-07-07 | Stop reason: SDUPTHER

## 2019-02-06 ENCOUNTER — HOSPITAL ENCOUNTER (OUTPATIENT)
Dept: PHYSICAL THERAPY | Age: 44
Discharge: HOME | End: 2019-02-06
Payer: COMMERCIAL

## 2019-02-06 PROCEDURE — 97110 THERAPEUTIC EXERCISES: CPT

## 2019-02-06 PROCEDURE — 97161 PT EVAL LOW COMPLEX 20 MIN: CPT

## 2019-02-06 PROCEDURE — 97140 MANUAL THERAPY 1/> REGIONS: CPT

## 2019-02-06 NOTE — PROGRESS NOTES
In [address] [place] [address], [address] [place], [address] [address] 
[phone] fax Plan of Care/ Statement of Necessity for Physical Therapy Services Patient name: Yasmine Larson Start of Care: 2019 Referral source: Paula Bright MD : 1975 Medical Diagnosis: Pain in left shoulder [M25.512] S/P shoulder surgery [Z98.890] Payor: Taina Conception / Plan:  [facility] / Product Type: PPO /  Onset Date:2018, RCR 10/2018, ROCHELLE 19 Treatment Diagnosis: decrease tolerance to ADLS and activities due to left shoulder pain, LOM, decrease strength Prior Hospitalization: see medical history Provider#: [id number] Medications: Verified on Patient summary List  
 Comorbidities:  DM, arthritis, Left knee scope and left shoulder RCR Prior Level of Function: Prior to ROCHELLE- was not having pain but was having LOM since left RCR Oct 2018. Household chores, community activities, working all were tolerate The Plan of Care and following information is based on the information from the initial evaluation. Assessment/ key information:  45 YO female diagnosed as above and with S/S consistent with above diagnosis presents to skilled outpatient PT. CCO decrease ROM and pain left shoulder S/P ROCHELLE 19. She notes a left RCR, acromioplasty and bicep tendon repair 10/2018. Pain today is 7/10. Previous Treatment/Compliance: PT, Surgery, medication, ice and heat ROM:  [] Unable to assess at this time AROM                                                              PROM 
  Left Right   Left Right Flexion 75 150 Flexion 135    
Extension     Extension      
Scaption/ABD 65 160 Scaptin/    
ER @ 0 Degrees 40 90 ER @ 0 Degrees 40    
ER @ 90 Degrees     ER @ 90 Degrees      
IR @ 0 Degrees abdomen abdomen IR @ 0 Degrees 20    
  
Right UE HBH able, HBB to lumbar region     Left UE HBH to ear with difficulty with pain  , HBB to hip bone with pain Pain is 7, FOTO 49, generalized TTP left shoulder/pec insertion Patient demonstrates the potential to make gains with improved ROM, strength, endurance/activity tolerance, functional FOTO survey score  and all within a reasonable time frame so as to increase their functional independence with ADLs and activities for carryover to  Improved quality of life, tolerance to work demands, household chores and community activities. Patient requires skilled Physical Therapy so as to monitor their response to and modify their treatment plan accordingly. Patient appears to be an appropriate candidate for skilled outpatient Physical Therapy. Evaluation Complexity History MEDIUM  Complexity : 1-2 comorbidities / personal factors will impact the outcome/ POC ; Examination MEDIUM Complexity : 3 Standardized tests and measures addressing body structure, function, activity limitation and / or participation in recreation  ;Presentation LOW Complexity : Stable, uncomplicated  ;Clinical Decision Making MEDIUM Complexity : FOTO score of 26-74 Overall Complexity Rating: LOW Problem List: pain affecting function, decrease ROM, decrease strength, decrease ADL/ functional abilitiies, decrease activity tolerance, decrease flexibility/ joint mobility, decrease transfer abilities and other FOTO 49 Treatment Plan may include any combination of the following: Therapeutic exercise, Therapeutic activities, Neuromuscular re-education, Physical agent/modality, Manual therapy, Patient education, Self Care training, Functional mobility training, Home safety training and Stair training Patient / Family readiness to learn indicated by: asking questions, trying to perform skills and interest 
Persons(s) to be included in education: patient (P) Barriers to Learning/Limitations: None Patient Goal (s):  get complete ROM and decrease pain. Return to PLOF  
 Patient Self Reported Health Status: fair Rehabilitation Potential: good Short Term Goals: To be accomplished in 5 treatments: 
 1 patient will have established and be I with HEP to aid with progression of skilled PT program 
 EVAL issued CURRENT  
 2 patient will have pain 5/10 to aid with increase tolerance to ADLS and activities at home and work EVAL 7 CURRENT Long Term Goals: To be accomplished in 12 treatments: 
 1 patient will have pain 3/10 to aid with increase tolerance to ADLS and activities at home and work EVAL 7 CURRENT 
 2 patient will have AROM Left shoulder F 125, , ER 50 to aid with increase tolerance to overhead reaching at home and work EVAL AROM Left shoulder F 75, abd 65 ER 40 CURRENT 
 3 patient will have MMT left shoulder MMG 4,4+/5 to aid with overhead reaching and moderate activities at home EVAL NA Due to pain CURRENT 
 4 patient will have FOTO 64 to show significant improvement with projected function at home and work EVAL 49 CURRENT Frequency / Duration: Patient to be seen 2-3 times per week for 12 treatments. Patient/ Caregiver education and instruction: Diagnosis, prognosis, self care, activity modification and exercises 
 [x]  Plan of care has been reviewed with PTA Andrew Lundborg, PT 2/6/2019 8:15 AM 
________________________________________________________________________ I certify that the above Therapy Services are being furnished while the patient is under my care. I agree with the treatment plan and certify that this therapy is necessary. [de-identified] Signature:____________Date:_________TIME:________ 
 
[employer], Date and Time must be completed for valid certification ** Please sign and return to In [address] [place] [address], [address], [address] [place], [place] [address] [address] 
[phone] fax

## 2019-02-06 NOTE — PROGRESS NOTES
PT DAILY TREATMENT NOTE/SHOULDER EVAL 10-18 Patient Name: Johan Ferguson Date:2019 : 1975 [x]  Patient  Verified Payor: BLUE CROSS / Plan: [facility] / Product Type: PPO / In [id number]:Out time:855 Total Treatment Time (min): 45 Visit #: 1 of 12 Medicare/BCBS Only Total Timed Codes (min):  25 1:1 Treatment Time:  25 Treatment Area: Pain in left shoulder [M25.512] S/P shoulder surgery [Z98.890] SUBJECTIVE Pain Level (0-10 scale): 7 [x]constant []intermittent []improving [x]worsening []no change since onset WORSE raising the arm, better with Ice, heat, meds, movement Any medication changes, allergies to medications, adverse drug reactions, diagnosis change, or new procedure performed?: [x] No    [] Yes (see summary sheet for update) Subjective functional status/changes:    
PLOF: Prior to ROCHELLE- was not having pain but was having LOM since left RCR Oct 2018. Household chores, community activities, working all were tolerated Limitations to PLOF: pain , decrease ROM Mechanism of Injury: 2018 insideous onset left shoulder involvement Current symptoms/Complaints: 43 YO female diagnosed as above and with S/S consistent with above diagnosis presents to skilled outpatient PT. CCO decrease ROM and pain left shoulder S/P ROCHELLE 19. She notes a left RCR, acromioplasty and bicep tendon repair 10/2018. Pain today is 7/10. Previous Treatment/Compliance: PT, Surgery, medication, ice and heat PMHx/Surgical Hx: DM, arthritis, Left knee scope and left shoulder RCR Work Hx: Full time, Utilization review for [employer] Living Situation: lives in a apartment, first floor, not alone Pt Goals: get complete ROM and decrease pain. Return to PLOF Barriers: [x]pain []financial []time []transportation []other Motivation: GOOD Substance use: []Alcohol []Tobacco []other:  
FABQ Score: []low []elevate Cognition: A & O x 4    Other: OBJECTIVE/EXAMINATION 
 Domestic Life: work, household chores, community activities Activity/Recreational Limitations: pain and LOM Mobility: I Self Care: at times needs assistance with dressing,  
 
 
 
Modality rationale: decrease inflammation and decrease pain to improve the patients ability to aid with increase tolerance to ADLs and activities Type Additional Details  
[] Estim:  []Unatt       []IFC  []Premod []Other:  []w/ice   []w/heat Position: Location:  
[] Estim: []Att    []TENS instruct  []NMES []Other:  []w/US   []w/ice   []w/heat Position: Location:  
[]  Traction: [] Cervical       []Lumbar 
                     [] Prone          []Supine []Intermittent   []Continuous Lbs: 
[] before manual 
[] after manual  
[]  Ultrasound: []Continuous   [] Pulsed []1MHz   []3MHz Location: 
W/cm2:  
[]  Iontophoresis with dexamethasone Location: [] Take home patch  
[] In clinic [x]  Ice post  10 minutes concurrent with exercise   []  heat 
[]  Ice massage 
[]  Laser  
[]  Anodyne Position:reclined Location:left shoulder  
[]  Laser with stim 
[]  Other: Position: Location:  
[]  Vasopneumatic Device Pressure:       [] lo [] med [] hi  
Temperature: [] lo [] med [] hi  
[] Skin assessment post-treatment:  []intact []redness- no adverse reaction 
  []redness  adverse reaction:  
 
20 min [x]Eval                  []Re-Eval  
 
 
15 min Therapeutic Exercise:  [x] See flow sheet :  
Rationale: increase ROM and improve coordination to improve the patients ability to aid with increase tolerance to ADLS and activities 
 
 min Therapeutic Activity:  []  See flow sheet :  
Rationale:   to improve the patients ability to  
  
 min Neuromuscular Re-education:  []  See flow sheet :  
Rationale:   to improve the patients ability to  
 
10 min Manual Therapy:  BASIL MARINELLI Mobs for IR ER  
 Rationale: decrease pain, increase ROM and increase tissue extensibility to aid with increase tolerance to ADLS and activities 
 
 min Gait Training:  ___ feet with ___ device on level surfaces with ___ level of assist  
Rationale: With 
 [] TE 
 [] TA 
 [] neuro 
 [] other: Patient Education: [x] Review HEP [] Progressed/Changed HEP based on:  
[] positioning   [] body mechanics   [] transfers   [] heat/ice application   
[] other:   
 
Other Objective/Functional Measures:  
 
Physical Therapy Evaluation - Shoulder Posture: [] Poor    [x] Fair    [] Good    Describe: 
 
ROM:  [] Unable to assess at this time AROM                                                              PROM Left Right  Left Right Flexion 75 150 Flexion 135 Extension   Extension Scaption/ABD 65 160 Scaptin/ ER @ 0 Degrees 40 90 ER @ 0 Degrees 40 ER @ 90 Degrees   ER @ 90 Degrees IR @ 0 Degrees abdomen abdomen IR @ 0 Degrees 20 Right UE HBH able, HBB to lumbar region     Left UE [address] to ear with difficulty with pain  , HBB to hip bone with pain End Feel / Painful Arc: 
 
Strength:   [] Unable to assess at this time L (1-5) R (1-5) Pain Flexors NA 5 [x] Yes  L  [] No  
Abductors NA 4+ [x] Yes L   [] No  
External Rotators NA 4+ [x] Yes  L  [] No  
Internal Rotators NA 5 [x] Yes  L  [] No  
Supraspinatus   [] Yes   [] No  
Serratus Anterior   [] Yes   [] No  
Lower Trapezius   [] Yes   [] No  
Elbow Flexion   [] Yes   [] No  
Elbow Extension   [] Yes   [] No  
 
 
Scapulohumoral Control / Rhythm: 
Able to eccentrically lower with good control? Left: [x] Yes   [] No     Right: [x] Yes   [] No 
 
Accessory Motions: 
 
Palpation [] Min  [] Mod  [] Severe    Location: 
[] Min  [] Mod  [] Severe    Location: 
[] Min  [] Mod  [] Severe    Location: 
 
Optional Tests: Sensation Left Right Reflexes Left Right Biceps (C5)   Biceps (C5) Glynn Radial(C6-7)   Brachioradialis (C6) Glynn Ulnar(C8-T1)   Triceps (C7) Adson's Test  [] Pos   [] Neg Yergason's Test [] Pos   [] Neg 
Jodie's Test  [] Pos   [] Neg Riley's Sign [] Pos   [] Neg Neer's Test  [] Pos   [] Neg Clunk Test  [] Pos   [] Neg 
Hawkin's Test  [] Pos   [] Neg AC Joint  [] Pos   [] Neg 
Speed's Test  [] Pos   [] Neg SC Joint  [] Pos   [] Neg 
Empty Can  [] Pos   [] Neg Pectoral Tightness [] Pos   [] Neg Anterior Apprehension [] Pos   [] Neg  
Posterior Apprehension [] Pos   [] Neg Other Tests / Comments:  
  
 
Pain Level (0-10 scale) post treatment: 7 ASSESSMENT/Changes in Function: Patient demonstrates the potential to make gains with improved ROM, strength, endurance/activity tolerance, functional FOTO survey score  and all within a reasonable time frame so as to increase their functional independence with ADLs and activities for carryover to  Improved quality of life, tolerance to work demands, household chores and community activities. Patient requires skilled Physical Therapy so as to monitor their response to and modify their treatment plan accordingly. Patient appears to be an appropriate candidate for skilled outpatient Physical Therapy. Patient will continue to benefit from skilled PT services to modify and progress therapeutic interventions, address ROM deficits, address strength deficits, analyze and address soft tissue restrictions, analyze and cue movement patterns, analyze and modify body mechanics/ergonomics, assess and modify postural abnormalities and instruct in home and community integration to attain remaining goals. [x]  See Plan of Care 
[]  See progress note/recertification 
[]  See Discharge Summary Progress towards goals / Updated goals: PLAN [x]  Upgrade activities as tolerated     [x]  Continue plan of care []  Update interventions per flow sheet      
[]  Discharge due to:_ 
[]  Other:_ Cherise May, PT 2/6/2019  8:16 AM

## 2019-02-08 ENCOUNTER — HOSPITAL ENCOUNTER (OUTPATIENT)
Dept: PHYSICAL THERAPY | Age: 44
Discharge: HOME | End: 2019-02-08
Payer: COMMERCIAL

## 2019-02-08 PROCEDURE — 97110 THERAPEUTIC EXERCISES: CPT

## 2019-02-08 PROCEDURE — 97140 MANUAL THERAPY 1/> REGIONS: CPT

## 2019-02-08 NOTE — PROGRESS NOTES
PT DAILY TREATMENT NOTE 10-18 Patient Name: Chapito Lam Date:2019 : 1975 [x]  Patient  Verified Payor: BLUE CROSS / Plan: [place] / Product Type: PPO / In time:8:30  Out time:9:11 Total Treatment Time (min): 41 Visit #: 2 of 12 Medicare/BCBS Only Total Timed Codes (min):  31 1:1 Treatment Time:  31  
 
 
Treatment Area: Pain in left shoulder [M25.512] S/P shoulder surgery [Z98.890] SUBJECTIVE Pain Level (0-10 scale): 5 Any medication changes, allergies to medications, adverse drug reactions, diagnosis change, or new procedure performed?: [x] No    [] Yes (see summary sheet for update) Subjective functional status/changes:   [] No changes reported States it is sore, she is frustrated it did not hurt this bad before the manipulation OBJECTIVE Modality rationale: decrease edema, decrease inflammation and decrease pain to improve the patients ability to ease with tolerance to ADLs Type Additional Details  
[] Estim:  []Unatt       []IFC  []Premod []Other:  []w/ice   []w/heat Position: Location:  
[] Estim: []Att    []TENS instruct  []NMES []Other:  []w/US   []w/ice   []w/heat Position: Location:  
[]  Traction: [] Cervical       []Lumbar 
                     [] Prone          []Supine []Intermittent   []Continuous Lbs: 
[] before manual 
[] after manual  
[]  Ultrasound: []Continuous   [] Pulsed []1MHz   []3MHz W/cm2: 
Location:  
[]  Iontophoresis with dexamethasone Location: [] Take home patch  
[] In clinic [x]  Ice     []  heat 
[]  Ice massage 
[]  Laser  
[]  Anodyne Position: incline Location: left shoulder  
[]  Laser with stim 
[]  Other:  Position: Location:  
[]  Vasopneumatic Device Pressure:       [] lo [] med [] hi  
Temperature: [] lo [] med [] hi  
[] Skin assessment post-treatment:  []intact []redness- no adverse reaction []redness  adverse reaction:  
 
 
20 min Therapeutic Exercise:  [x] See flow sheet :  
Rationale: increase ROM, increase strength and increase proprioception to improve the patients ability to perform daily household chores. 11 min Manual Therapy:  PROM with overpressure flexion/ abd/ IR/ ER, LAD with oscillation Rationale: decrease pain, increase ROM and increase tissue extensibility to assist with overhead activities such as grooming. With 
 [] TE 
 [] TA 
 [] neuro 
 [] other: Patient Education: [x] Review HEP [] Progressed/Changed HEP based on:  
[] positioning   [] body mechanics   [] transfers   [] heat/ice application   
[] other:   
 
Other Objective/Functional Measures: Tolerated initiation of therex well- requires assist from other UE for wall slides No pain noted with manual therapy- just discomfort Pain Level (0-10 scale) post treatment: 6 
 
ASSESSMENT/Changes in Function: Patient continues to show improvements with signs/ symptoms however still demonstrates a decrease in strength, impaired ROM,  and an increase in pain with functional activities. Patient will continue to benefit from skilled PT services to modify and progress therapeutic interventions, address functional mobility deficits, address strength deficits, analyze and cue movement patterns and analyze and modify body mechanics/ergonomics to attain remaining goals. [x]  See Plan of Care 
[]  See progress note/recertification 
[]  See Discharge Summary Progress towards goals / Updated goals: 
Short Term Goals: To be accomplished in 5 treatments: 
             1 patient will have established and be I with HEP to aid with progression of skilled PT program 
             EVAL issued CURRENT  
             2 patient will have pain 5/10 to aid with increase tolerance to ADLS and activities at home and work EVAL 7              CURRENT 
              
 Long Term Goals: To be accomplished in 12 treatments: 
             1 patient will have pain 3/10 to aid with increase tolerance to ADLS and activities at home and work EVAL 7 CURRENT 
             2 patient will have AROM Left shoulder F 125, , ER 50 to aid with increase tolerance to overhead reaching at home and work EVAL AROM Left shoulder F 75, abd 65 ER 40 CURRENT 
             3 patient will have MMT left shoulder MMG 4,4+/5 to aid with overhead reaching and moderate activities at home EVAL NA Due to pain CURRENT 
             4 patient will have FOTO 64 to show significant improvement with projected function at home and work EVAL 49 CURRENT 
 
 
 
PLAN [x]  Upgrade activities as tolerated     [x]  Continue plan of care 
[]  Update interventions per flow sheet      
[]  Discharge due to:_ 
[]  Other:_ Roshan Christie, PT 2/8/2019  8:10 AM 
 
Future Appointments Date Time Provider Glo Chavez 2/8/2019  8:30 AM Berlin Ayala, PT [facility]  
2/11/2019  4:30 PM Mikaela Shetty, PT [facility]  
2/13/2019  4:00 PM Evelio Alvarez PTA [facility]  
2/15/2019  4:00 PM Berlin Ayala, PT [facility]

## 2019-02-11 ENCOUNTER — HOSPITAL ENCOUNTER (OUTPATIENT)
Dept: PHYSICAL THERAPY | Age: 44
Discharge: HOME | End: 2019-02-11
Payer: COMMERCIAL

## 2019-02-11 PROCEDURE — 97140 MANUAL THERAPY 1/> REGIONS: CPT

## 2019-02-11 PROCEDURE — 97110 THERAPEUTIC EXERCISES: CPT

## 2019-02-11 NOTE — PROGRESS NOTES
PT DAILY TREATMENT NOTE 10-18 Patient Name: Sandra Pino Date:2019 : 1975 [x]  Patient  Verified Payor: BLUE CROSS / [place]: [place] [employer] / Product Type: PPO / In time:402  Out time:450 Total Treatment Time (min): 48 Visit #: 3 of 12 Medicare/BCBS Only Total Timed Codes (min):  38 1:1 Treatment Time:  28 Treatment Area: Pain in left shoulder [M25.512] S/P shoulder surgery [Z98.890] SUBJECTIVE Pain Level (0-10 scale): 0 Any medication changes, allergies to medications, adverse drug reactions, diagnosis change, or new procedure performed?: [x] No    [] Yes (see summary sheet for update) Subjective functional status/changes:   [] No changes reported It is doing better. I am doing the execi OBJECTIVE Modality rationale: increase tissue extensibility to improve the patients ability to aid with increase tolerance to ADLs and activities Min Type Additional Details  
 [] Estim:  []Unatt       []IFC  []Premod []Other:  []w/ice   []w/heat Position: Location:  
 [] Estim: []Att    []TENS instruct  []NMES []Other:  []w/US   []w/ice   []w/heat Position: Location:  
 []  Traction: [] Cervical       []Lumbar 
                     [] Prone          []Supine []Intermittent   []Continuous Lbs: 
[] before manual 
[] after manual  
 []  Ultrasound: []Continuous   [] Pulsed []1MHz   []3MHz W/cm2: 
Location:  
 []  Iontophoresis with dexamethasone Location: [] Take home patch  
[] In clinic  
10 [x]  Ice  post   []  heat 
[]  Ice massage 
[]  Laser  
[]  Anodyne Position:reclined Location:left shoulder  
 []  Laser with stim 
[]  Other:  Position: Location:  
 []  Vasopneumatic Device Pressure:       [] lo [] med [] hi  
Temperature: [] lo [] med [] hi  
[] Skin assessment post-treatment:  []intact []redness- no adverse reaction 
  []redness  adverse reaction: min []Eval                  []Re-Eval  
 
 
23 min Therapeutic Exercise:  [] See flow sheet :  
Rationale: increase ROM, increase strength and improve coordination to improve the patients ability to aid with increase tolerance to ADLs and activities 
 
 min Therapeutic Activity:  []  See flow sheet :  
Rationale:   to improve the patients ability to  
  
 min Neuromuscular Re-education:  []  See flow sheet :  
Rationale:   to improve the patients ability to  
 
15 min Manual Therapy:  PROM AAROM all planes left shoulder with mobs for IR ER , LAD OSCILL Rationale: increase ROM and increase tissue extensibility to aid with increase tolerance to ADLS and activities 
 
 min Gait Training:  ___ feet with ___ device on level surfaces with ___ level of assist  
Rationale: With 
 [] TE 
 [] TA 
 [] neuro 
 [] other: Patient Education: [x] Review HEP [] Progressed/Changed HEP based on:  
[] positioning   [] body mechanics   [] transfers   [] heat/ice application   
[x] other: due to copay decrease to 2X week. VC to emphasize ER IR stretches and self mobs left shoulder Other Objective/Functional Measures: VC exercises and technique Pain Level (0-10 scale) post treatment: 0 
 
ASSESSMENT/Changes in Function: tolerated well. Patient will continue to benefit from skilled PT services to modify and progress therapeutic interventions, address ROM deficits, address strength deficits, analyze and address soft tissue restrictions, analyze and cue movement patterns, analyze and modify body mechanics/ergonomics, assess and modify postural abnormalities and instruct in home and community integration to attain remaining goals. [x]  See Plan of Care 
[]  See progress note/recertification 
[]  See Discharge Summary Progress towards goals / Updated goals: 
Short Term Goals: To be accomplished in 5 treatments: 
             1 patient will have established and be I with HEP to aid with progression of skilled PT program 
             EVAL issued 
             NTMWQRI  met 2/11/19 
             2 patient will have pain 5/10 to aid with increase tolerance to ADLS and activities at home and work 
             EVAL 7 
             CURRENT 0 2/11/19 
  
[address] [facility] [address] be accomplished in 12 treatments: 
             1 patient will have pain 3/10 to aid with increase tolerance to ADLS and activities at home and work 
             EVAL 7 
             PZAXWRI 
             2 patient will have AROM Left shoulder F 125, , ER 50 to aid with increase tolerance to overhead reaching at home and work 
             EVAL AROM Left shoulder F 75, abd 65 ER 40 
             QDUNAOI 
             3 patient will have MMT left shoulder MMG 4,4+/5 to aid with overhead reaching and moderate activities at home  
             EVAL NA Due to pain 
             NHIYOPI 
             4 patient will have FOTO 64 to show significant improvement with projected function at home and work 
             EVAL 26 [id number] [x]  Upgrade activities as tolerated     [x]  Continue plan of care 
[]  Update interventions per flow sheet      
[]  Discharge due to:_ 
[]  Other:_ Melina Morgan, PT 2/11/2019  4:05 PM 
 
Future Appointments Date Time Provider Glo Chavez 2/11/2019  4:30 PM Chadd Maya, PT [facility]  
2/13/2019  4:00 PM Edith Hebert, PTA [facility]  
2/15/2019  4:00 PM Cameron Monson, PT [facility]

## 2019-02-13 ENCOUNTER — HOSPITAL ENCOUNTER (OUTPATIENT)
Dept: PHYSICAL THERAPY | Age: 44
Discharge: HOME | End: 2019-02-13
Payer: COMMERCIAL

## 2019-02-13 PROCEDURE — 97110 THERAPEUTIC EXERCISES: CPT

## 2019-02-13 PROCEDURE — 97140 MANUAL THERAPY 1/> REGIONS: CPT

## 2019-02-13 NOTE — PROGRESS NOTES
PT DAILY TREATMENT NOTE 10-18 Patient Name: Cherelle Forrest Date:2019 : 1975 [x]  Patient  Verified Payor: BLUE CROSS / Plan: [place] [employer] / Product Type: PPO / In time: 3:58 Out time: 4:35 Total Treatment Time (min): 38 Visit #: 4 of 12 Medicare/BCBS Only Total Timed Codes (min):  38 1:1 Treatment Time:  23 Treatment Area: Pain in left shoulder [M25.512] S/P shoulder surgery [Z98.890] SUBJECTIVE Pain Level (0-10 scale): 0 Any medication changes, allergies to medications, adverse drug reactions, diagnosis change, or new procedure performed?: [x] No    [] Yes (see summary sheet for update) Subjective functional status/changes:   [] No changes reported No pain OBJECTIVE Modality rationale: decrease edema, decrease inflammation, decrease pain and increase tissue extensibility to improve the patients ability to perform ADL Min Type Additional Details  
 [] Estim:  []Unatt       []IFC  []Premod []Other:  []w/ice   []w/heat Position: Location:  
 [] Estim: []Att    []TENS instruct  []NMES []Other:  []w/US   []w/ice   []w/heat Position: Location:  
 []  Traction: [] Cervical       []Lumbar 
                     [] Prone          []Supine []Intermittent   []Continuous Lbs: 
[] before manual 
[] after manual  
 []  Ultrasound: []Continuous   [] Pulsed []1MHz   []3MHz W/cm2: 
Location:  
 []  Iontophoresis with dexamethasone Location: [] Take home patch  
[] In clinic  
 []  Ice     []  heat 
[]  Ice massage 
[]  Laser  
[]  Anodyne Position: Location:  
 []  Laser with stim 
[]  Other:  Position: Location:  
 []  Vasopneumatic Device Pressure:       [] lo [] med [] hi  
Temperature: [] lo [] med [] hi  
[x] Skin assessment post-treatment:  [x]intact []redness- no adverse reaction 
  []redness  adverse reaction:  
 
 min []Eval                  []Re-Eval  
 23 min Therapeutic Exercise:  [x] See flow sheet :  
Rationale: increase ROM and increase strength to improve the patients ability to perform ADL 
 
 min Therapeutic Activity:  []  See flow sheet :  
Rationale:   to improve the patients ability to  
  
 min Neuromuscular Re-education:  []  See flow sheet :  
Rationale:   to improve the patients ability to  
 
15 min Manual Therapy: [address] [facility] [address]  Mob with distraction Rationale: decrease pain, increase ROM, increase tissue extensibility and decrease edema  to perform ADL  
 
 min Gait Training:  ___ feet with ___ device on level surfaces with ___ level of assist  
Rationale: With 
 [x] TE 
 [] TA 
 [] neuro 
 [] other: Patient Education: [x] Review HEP [] Progressed/Changed HEP based on:  
[] positioning   [] body mechanics   [] transfers   [] heat/ice application   
[] other:   
 
Other Objective/Functional Measures:  Experienced  Pain following  Inferior/posterior  Cap  Stretch Pain Level (0-10 scale) post treatment:0  
 
ASSESSMENT/Changes in Function: Minimal/mod  Tightness  Full shoulder  Flex/ER. Pt  Requested  To  Use  CP  At  Home. Patient will continue to benefit from skilled PT services to address functional mobility deficits, address ROM deficits, address strength deficits, analyze and address soft tissue restrictions, analyze and cue movement patterns and instruct in home and community integration to attain remaining goals. [x]  See Plan of Care 
[]  See progress note/recertification 
[]  See Discharge Summary Progress towards goals / Updated goals: 
Short Term Goals: To be accomplished in 5 treatments: 
             1 patient will have established and be I with HEP to aid with progression of skilled PT program 
Jessica Bolivar issued 
             SNLASBK  met 2/11/19 
             2 patient will have pain 5/10 to aid with increase tolerance to ADLS and activities at home and work 
             EVAL 7 
              JAIME 0 2/11/19 
  
Long Term Goals: To be accomplished in 12 treatments: 
             1 patient will have pain 3/10 to aid with increase tolerance to ADLS and activities at home and work 
             EVAL 7 
             LOSRTJJ 
             2 patient will have AROM Left shoulder F 125, , ER 50 to aid with increase tolerance to overhead reaching at home and work 
             EVAL AROM Left shoulder F 75, abd 65 ER 40 
             GNCDEYC 
             3 patient will have MMT left shoulder MMG 4,4+/5 to aid with overhead reaching and moderate activities at home  
             EVAL NA Due to pain 
             LKVOQFH 
             4 patient will have FOTO 64 to show significant improvement with projected function at home and work 
             EVAL 97 [id number] 
[]  Upgrade activities as tolerated     [x]  Continue plan of care 
[]  Update interventions per flow sheet      
[]  Discharge due to:_ 
[]  Other:_   
 
Chloe Coombs, ALBINA 2/13/2019  4:18 PM 
 
No future appointments.

## 2019-02-15 ENCOUNTER — APPOINTMENT (OUTPATIENT)
Dept: PHYSICAL THERAPY | Age: 44
End: 2019-02-15
Payer: COMMERCIAL

## 2019-02-27 ENCOUNTER — HOSPITAL ENCOUNTER (OUTPATIENT)
Dept: PHYSICAL THERAPY | Age: 44
Discharge: HOME | End: 2019-02-27
Payer: COMMERCIAL

## 2019-02-27 PROCEDURE — 97110 THERAPEUTIC EXERCISES: CPT

## 2019-02-27 PROCEDURE — 97140 MANUAL THERAPY 1/> REGIONS: CPT

## 2019-02-27 NOTE — PROGRESS NOTES
PT DAILY TREATMENT NOTE 10-18 Patient Name: Yasmine Larson Date:2019 : 1975 [x]  Patient  Verified Payor: BLUE CROSS / Plan: [place] [employer] / Product Type: PPO / In time:5:10  Out time:5:33 Total Treatment Time (min): 23 Visit #: 5 of 12 Medicare/BCBS Only Total Timed Codes (min):  23 1:1 Treatment Time:  21 Treatment Area: Pain in left shoulder [M25.512] S/P shoulder surgery [Z98.890] SUBJECTIVE Pain Level (0-10 scale): \"tight\" Any medication changes, allergies to medications, adverse drug reactions, diagnosis change, or new procedure performed?: [x] No    [] Yes (see summary sheet for update) Subjective functional status/changes:   [] No changes reported Patient had eyes dilated today and is having difficulty with sight / is very tired OBJECTIVE 13 min Therapeutic Exercise:  [x] See flow sheet :  
Rationale: increase ROM, increase strength and increase proprioception to improve the patients ability to perform daily household chores. 10 min Manual Therapy:  LAD with oscillation, PROM with overpressure flexion/ abd/ IR/ ER Rationale: decrease pain, increase ROM and increase tissue extensibility to assist with overhead activities such as cooking/ cleaning With 
 [] TE 
 [] TA 
 [] neuro 
 [] other: Patient Education: [x] Review HEP [] Progressed/Changed HEP based on:  
[] positioning   [] body mechanics   [] transfers   [] heat/ice application   
[] other:   
 
Other Objective/Functional Measures: Tolerated all therex well Modified due to fatigue/ eye appointment earlier today Focus on ROM therex per flow sheet Pain Level (0-10 scale) post treatment: 0 - reports feeling better after manual  
 
ASSESSMENT/Changes in Function: Patient continues to show improvements with signs/ symptoms however still demonstrates a decrease in strength, impaired ROM,and an increase in pain with functional activities. Patient will continue to benefit from skilled PT services to modify and progress therapeutic interventions, address functional mobility deficits, address strength deficits, analyze and cue movement patterns and analyze and modify body mechanics/ergonomics to attain remaining goals. [x]  See Plan of Care 
[]  See progress note/recertification 
[]  See Discharge Summary Progress towards goals / Updated goals: 
Short Term Goals: To be accomplished in 5 treatments: 
             1 patient will have established and be I with HEP to aid with progression of skilled PT program 
             EVAL issued 
             HPONFJW  met 2/11/19 
             2 patient will have pain 5/10 to aid with increase tolerance to ADLS and activities at home and work 
             EVAL 7 
             CURRENT 0 2/11/19 
  
[address] be accomplished in 12 treatments: 
             1 patient will have pain 3/10 to aid with increase tolerance to ADLS and activities at home and work 
             EVAL 7 
             BMVNXEW 
             2 patient will have AROM Left shoulder F 125, , ER 50 to aid with increase tolerance to overhead reaching at home and work 
             EVAL AROM Left shoulder F 75, abd 65 ER 40 
             FIKPFYA 
             3 patient will have MMT left shoulder MMG 4,4+/5 to aid with overhead reaching and moderate activities at home  
             EVAL NA Due to pain 
             ODLEIUL 
             4 patient will have FOTO 64 to show significant improvement with projected function at home and work 
             EVAL 97 [id number] [x]  Upgrade activities as tolerated     [x]  Continue plan of care 
[]  Update interventions per flow sheet      
[]  Discharge due to:_ 
[]  Other:_ Adan Fulton, PT 2/27/2019  12:48 PM 
 
Future Appointments Date Time Provider Glo Chavez 2/27/2019  5:00 PM [facility]  
 2/28/2019  7:20 AM Cam Martinez, NP [address]  
3/4/2019  5:00 PM Mattie Leyden, PTA [facility]  
3/6/2019  5:30 PM Mattie Leyden, PTA [facility]  
3/11/2019  5:00 PM Mattie Leyden, PTA [facility]  
3/13/2019  5:30 PM Corin Crystal, PTA [facility]  
3/18/2019  5:00 PM Mattie Leyden, PTA [facility]  
3/20/2019  5:30 PM Corin Crystal, PTA [facility]  
3/25/2019  5:00 PM Corin Crystal, PTA [facility]  
3/27/2019  5:30 PM Corin Crystal, PTA [facility]

## 2019-02-28 ENCOUNTER — OFFICE VISIT (OUTPATIENT)
Dept: FAMILY MEDICINE CLINIC | Age: 44
End: 2019-02-28

## 2019-02-28 VITALS
BODY MASS INDEX: 37.12 KG/M2 | OXYGEN SATURATION: 97 % | WEIGHT: 231 LBS | HEIGHT: 66 IN | DIASTOLIC BLOOD PRESSURE: 90 MMHG | RESPIRATION RATE: 16 BRPM | TEMPERATURE: 98.2 F | HEART RATE: 95 BPM | SYSTOLIC BLOOD PRESSURE: 136 MMHG

## 2019-02-28 DIAGNOSIS — M25.50 ARTHRALGIA, UNSPECIFIED JOINT: ICD-10-CM

## 2019-02-28 DIAGNOSIS — F98.8 ATTENTION DEFICIT DISORDER (ADD) WITHOUT HYPERACTIVITY: ICD-10-CM

## 2019-02-28 DIAGNOSIS — E78.00 PURE HYPERCHOLESTEROLEMIA: ICD-10-CM

## 2019-02-28 DIAGNOSIS — R63.5 ABNORMAL WEIGHT GAIN: ICD-10-CM

## 2019-02-28 DIAGNOSIS — E11.9 TYPE 2 DIABETES MELLITUS WITHOUT COMPLICATION, UNSPECIFIED WHETHER LONG TERM INSULIN USE (HCC): Primary | ICD-10-CM

## 2019-02-28 DIAGNOSIS — E66.9 OBESITY, CLASS II, BMI 35-39.9: ICD-10-CM

## 2019-02-28 LAB — HBA1C MFR BLD HPLC: 9.5 %

## 2019-02-28 RX ORDER — INSULIN DEGLUDEC 200 U/ML
10 INJECTION, SOLUTION SUBCUTANEOUS DAILY
Qty: 1 ADJUSTABLE DOSE PRE-FILLED PEN SYRINGE | Refills: 3 | Status: SHIPPED | OUTPATIENT
Start: 2019-02-28 | End: 2019-04-07 | Stop reason: SDUPTHER

## 2019-02-28 RX ORDER — PEN NEEDLE, DIABETIC 31 GX3/16"
NEEDLE, DISPOSABLE MISCELLANEOUS
Qty: 100 PEN NEEDLE | Refills: 3 | Status: SHIPPED | OUTPATIENT
Start: 2019-02-28 | End: 2020-05-18

## 2019-02-28 RX ORDER — DEXTROAMPHETAMINE SACCHARATE, AMPHETAMINE ASPARTATE, DEXTROAMPHETAMINE SULFATE AND AMPHETAMINE SULFATE 5; 5; 5; 5 MG/1; MG/1; MG/1; MG/1
20 TABLET ORAL 2 TIMES DAILY
Qty: 60 TAB | Refills: 0 | Status: SHIPPED | OUTPATIENT
Start: 2019-02-28 | End: 2019-04-12 | Stop reason: SDUPTHER

## 2019-02-28 NOTE — PROGRESS NOTES
Subjective:    Olinda Gill is a 44y.o. year old female seen for follow up of diabetes. She also has hypertension, hyperlipidemia and obesity. Diabetic Review of Systems - medication compliance: compliant all of the time, diabetic diet compliance: noncompliant some of the time, home glucose monitoring: is performed regularly, fasting values range 120-160, further diabetic ROS: no polyuria or polydipsia, no chest pain, dyspnea or TIA's, no numbness, tingling or pain in extremities, last eye exam 2/27/19, acute symptoms are none. Other symptoms and concerns: patient reports she was seen optometrist last week and informed she has bleeding behind her eyes and referred to an ophthalmologist.  Reports she was was seen yesterday she was informed she had mild edema and her blood vessels were leaking. She is scheduled to see a retinopathy specialist.  Patient reports she has been having multiple joint for several years. Positive family history of RA in mother. She also admits to AM stiffiness. Patient Active Problem List   Diagnosis Code    Diabetes mellitus, type II ([facility] [address] E11.9    Hyperlipidemia E78.5    ADHD (attention deficit hyperactivity disorder) F90.9    Type 2 diabetes with nephropathy ([facility] [address] E11.21     Current Outpatient Medications   Medication Sig Dispense Refill    butalbital-acetaminophen-caffeine (FIORICET, ESGIC) -40 mg per tablet May take 1-2 tabs po every 4-6 hrs as needed for headache. Not to exceed 6 tabs in 24 hrs 60 Tab 2    SITagliptin (JANUVIA) 100 mg tablet TAKE 1 TABLET DAILY 90 Tab 1    ergocalciferol (ERGOCALCIFEROL) 50,000 unit capsule TAKE ONE CAPSULE BY MOUTH EVERY 7 DAYS 12 Cap 0    glucose blood VI test strips (ONETOUCH VERIO) strip To use with verio meter.   Check glucose 3 times per day 100 Strip 11    lancets misc Blood sugar check  Each 11    glipiZIDE (GLUCOTROL) 10 mg tablet TAKE 1 TABLET TWICE A  Tab 1    simvastatin (ZOCOR) 10 mg tablet TAKE 1 TABLET EVERY NIGHT 90 Tab 1    Blood-Glucose Meter (ONETOUCH VERIO FLEX START) monitoring kit Check glucose 3 times daily 1 Kit 1    Lancets misc To use with one touch mini delica device. Check glucose 3 times daily 1 Package 11    Insulin Needles, Disposable, (REBEKAH PEN NEEDLE) 32 gauge x 5/32\" ndle To be used once daily with Johnieezequielobi Banister FlexTouch Pen. 100 Pen Needle 3    losartan (COZAAR) 100 mg tablet Take 100 mg by mouth daily.  promethazine-codeine (PHENERGAN WITH CODEINE) 6.25-10 mg/5 mL syrup Take 5 mL by mouth four (4) times daily as needed for Cough. Max Daily Amount: 20 mL. 120 mL 0    dextroamphetamine-amphetamine (ADDERALL) 20 mg tablet Take 1 Tab (20 mg total) by mouth two (2) times a day. Max Daily Amount: 40 mg 60 Tab 0    insulin degludec (TRESIBA FLEXTOUCH U-200) 200 unit/mL (3 mL) inpn 10 Units by SubCUTAneous route daily.  1 Adjustable Dose Pre-filled Pen Syringe 3      Allergies   Allergen Reactions    Lisinopril Cough     Past Surgical History:   Procedure Laterality Date    HX GYN      hysterectomy    HX HYSTERECTOMY      2007    HX ORTHOPAEDIC  12/2014    knee    HX WISDOM TEETH EXTRACTION       Family History   Problem Relation Age of Onset    Diabetes Other     Heart Disease Other     Hypertension Other     Hypertension Mother     Diabetes Mother     High Cholesterol Mother     Diabetes Father     Hypertension Father     High Cholesterol Father       Lab Results   Component Value Date/Time    Cholesterol, total 200 (H) 05/02/2018 07:48 AM    HDL Cholesterol 42 05/02/2018 07:48 AM    LDL, calculated 138 (H) 05/02/2018 07:48 AM    VLDL, calculated 20 05/02/2018 07:48 AM    Triglyceride 99 05/02/2018 07:48 AM    CHOL/HDL Ratio 5.3 (H) 01/21/2016 09:00 AM     Lab Results   Component Value Date/Time    Sodium 138 02/02/2019 11:22 AM    Potassium 3.7 02/02/2019 11:22 AM    Chloride 106 02/02/2019 11:22 AM    CO2 25 02/02/2019 11:22 AM    Anion gap 7 02/02/2019 11:22 AM    Glucose 193 (H) 02/02/2019 11:22 AM    BUN 13 02/02/2019 11:22 AM    Creatinine 0.59 (L) 02/02/2019 11:22 AM    BUN/Creatinine ratio 22 (H) 02/02/2019 11:22 AM    GFR est AA >60 02/02/2019 11:22 AM    GFR est non-AA >60 02/02/2019 11:22 AM    Calcium 8.7 02/02/2019 11:22 AM    Bilirubin, total 0.2 02/02/2019 11:22 AM    AST (SGOT) 12 (L) 02/02/2019 11:22 AM    Alk. phosphatase 69 02/02/2019 11:22 AM    Protein, total 6.7 02/02/2019 11:22 AM    Albumin 2.8 (L) 02/02/2019 11:22 AM    Globulin 3.9 02/02/2019 11:22 AM    A-G Ratio 0.7 (L) 02/02/2019 11:22 AM    ALT (SGPT) 15 02/02/2019 11:22 AM     Lab Results   Component Value Date/Time    WBC 8.2 01/24/2019 11:22 AM    HGB 12.0 01/24/2019 11:22 AM    HCT 37.1 01/24/2019 11:22 AM    PLATELET 924 82/29/4345 11:22 AM    MCV 78.9 01/24/2019 11:22 AM     Wt Readings from Last 3 Encounters:   02/28/19 231 lb (104.8 kg)   01/24/19 230 lb (104.3 kg)   12/11/18 225 lb (102.1 kg)     Last Point of Care HGB A1C  Hemoglobin A1c (POC)   Date Value Ref Range Status   05/02/2018 7.5 % Final      BP Readings from Last 3 Encounters:   01/24/19 (!) 161/98   12/11/18 138/85   05/02/18 130/80     A1C 9.5    Last Point of Care Urine mircoalbumin  Results for orders placed or performed during the hospital encounter of 57/51/70   METABOLIC PANEL, COMPREHENSIVE   Result Value Ref Range    Sodium 138 136 - 145 mmol/L    Potassium 3.7 3.5 - 5.5 mmol/L    Chloride 106 100 - 108 mmol/L    CO2 25 21 - 32 mmol/L    Anion gap 7 3.0 - 18 mmol/L    Glucose 193 (H) 74 - 99 mg/dL    BUN 13 7.0 - 18 MG/DL    Creatinine 0.59 (L) 0.6 - 1.3 MG/DL    BUN/Creatinine ratio 22 (H) 12 - 20      GFR est AA >60 >60 ml/min/1.73m2    GFR est non-AA >60 >60 ml/min/1.73m2    Calcium 8.7 8.5 - 10.1 MG/DL    Bilirubin, total 0.2 0.2 - 1.0 MG/DL    ALT (SGPT) 15 13 - 56 U/L    AST (SGOT) 12 (L) 15 - 37 U/L    Alk.  phosphatase 69 45 - 117 U/L    Protein, total 6.7 6.4 - 8.2 g/dL    Albumin 2.8 (L) 3.4 - 5.0 g/dL    Globulin 3.9 2.0 - 4.0 g/dL    A-G Ratio 0.7 (L) 0.8 - 1.7       Lab Results   Component Value Date/Time    Microalbumin/Creat ratio (mg/g creat) 503 01/21/2016 09:00 AM    Microalb/Creat ratio (ug/mg creat.) 3,049.4 (H) 05/02/2018 07:48 AM    Microalbumin,urine random 63.50 (H) 01/21/2016 09:00 AM    Microalbumin urine (POC) 150 12/06/2013 12:30 PM       Last Diabetic Foot Exam on: 5/3/18        Objective:  Visit Vitals  Pulse 95   Temp 98.2 °F (36.8 °C) (Oral)   Resp 16   Ht 5' 6\" (1.676 m)   Wt 231 lb (104.8 kg)   LMP 01/01/2007   SpO2 97%   BMI 37.28 kg/m²     Awake and alert in no acute distress   Neck supple without lymphadenopathy, no carotid artery bruits auscultated bilaterally. No thyromegaly   Lungs clear throughout   S1 S2 RRR without ectopy or murmur auscultated. Extremities: no clubbing, cyanosis, peripheral edema   Foot exam:    Integumentary: no rashes   Reviewed vital signs           Assessment/Plan:    ICD-10-CM ICD-9-CM    1. Type 2 diabetes mellitus without complication, unspecified whether long term insulin use (HCC) E11.9 250.00 AMB POC HEMOGLOBIN A1C      insulin degludec (TRESIBA FLEXTOUCH U-200) 200 unit/mL (3 mL) inpn      MICROALBUMIN, UR, RAND W/ MICROALB/CREAT RATIO      LIPID PANEL      METABOLIC PANEL, COMPREHENSIVE   2. Pure hypercholesterolemia E78.00 272.0    3. Attention deficit disorder (ADD) without hyperactivity F98.8 314.00 dextroamphetamine-amphetamine (ADDERALL) 20 mg tablet   4. Abnormal weight gain R63.5 783.1 TSH 3RD GENERATION      T4, FREE   5.  Arthralgia, unspecified joint M25.50 719.40 RHEUMASSURE      SED RATE (ESR)      BETZY COMPREHENSIVE PANEL     Orders Placed This Encounter    MICROALBUMIN, UR, RAND W/ MICROALB/CREAT RATIO    LIPID PANEL    METABOLIC PANEL, COMPREHENSIVE    TSH 3RD GENERATION    T4, FREE    RHEUMASSURE    SED RATE (ESR)    BETZY COMPREHENSIVE PANEL    AMB POC HEMOGLOBIN A1C    insulin degludec (TRESIBA FLEXTOUCH U-200) 200 unit/mL (3 mL) inpn    dextroamphetamine-amphetamine (ADDERALL) 20 mg tablet    Insulin Needles, Disposable, (REBEKAH PEN NEEDLE) 32 gauge x 5/32\" ndle    flash glucose scanning reader (FREESTYLE NOLAN 14 DAY READER) misc    flash glucose sensor (FREESTYLE NOLAN 14 DAY SENSOR) kit      needs further observation, needs to follow diet more regularly  Issues reviewed with her: foot care discussed and Podiatry visits discussed, annual eye examinations at Ophthalmology discussed and glycohemoglobin and other lab monitoring discussed. I have discussed the diagnosis with the patient and the intended plan as seen in the above orders. The patient has received an after-visit summary and questions were answered concerning future plans. I have discussed medication side effects and warnings with the patient as well. Patient agreeable with above plan and verbalizes understanding. Follow-up Disposition:  Return in about 4 weeks (around 3/28/2019) for DM since resuming insulin.

## 2019-02-28 NOTE — PATIENT INSTRUCTIONS

## 2019-02-28 NOTE — PROGRESS NOTES
Pt is here for f/u on diabetes    1. Have you been to the ER, urgent care clinic since your last visit? Hospitalized since your last visit? Yes 1/24/19 [facility]  ED migraine    2. Have you seen or consulted any other health care providers outside of the [address] [place] [address] since your last visit? Include any pap smears or colon screening.  Yes Dr Geovanna Tirado 10/18, Dr Mil Patel 2/27/19

## 2019-03-04 ENCOUNTER — HOSPITAL ENCOUNTER (OUTPATIENT)
Dept: PHYSICAL THERAPY | Age: 44
Discharge: HOME | End: 2019-03-04
Payer: COMMERCIAL

## 2019-03-04 PROCEDURE — 97140 MANUAL THERAPY 1/> REGIONS: CPT

## 2019-03-04 PROCEDURE — 97110 THERAPEUTIC EXERCISES: CPT

## 2019-03-04 NOTE — PROGRESS NOTES
PT DAILY TREATMENT NOTE 10-18 Patient Name: Madelin Maxwell Date:3/4/2019 : 1975 [x]  Patient  Verified Payor: BLUE CROSS / Plan: [place] [employer] / Product Type: PPO / In time: 5:07  Out time:5:43 Total Treatment Time (min): 35 Visit #: 6 of 12 Medicare/BCBS Only Total Timed Codes (min):  38 1:1 Treatment Time:  45 Treatment Area: Pain in left shoulder [M25.512] S/P shoulder surgery [Z98.890] SUBJECTIVE Pain Level (0-10 scale): 0 Any medication changes, allergies to medications, adverse drug reactions, diagnosis change, or new procedure performed?: [x] No    [] Yes (see summary sheet for update) Subjective functional status/changes:   [] No changes reported No  pain OBJECTIVE Modality rationale: decrease edema, decrease inflammation, decrease pain and increase tissue extensibility to improve the patients ability to perform ADL Min Type Additional Details  
 [] Estim:  []Unatt       []IFC  []Premod []Other:  []w/ice   []w/heat Position: Location:  
 [] Estim: []Att    []TENS instruct  []NMES []Other:  []w/US   []w/ice   []w/heat Position: Location:  
 []  Traction: [] Cervical       []Lumbar 
                     [] Prone          []Supine []Intermittent   []Continuous Lbs: 
[] before manual 
[] after manual  
 []  Ultrasound: []Continuous   [] Pulsed []1MHz   []3MHz W/cm2: 
Location:  
 []  Iontophoresis with dexamethasone Location: [] Take home patch  
[] In clinic  
 []  Ice     []  heat 
[]  Ice massage 
[]  Laser  
[]  Anodyne Position: Location:  
 []  Laser with stim 
[]  Other:  Position: Location:  
 []  Vasopneumatic Device Pressure:       [] lo [] med [] hi  
Temperature: [] lo [] med [] hi  
[x] Skin assessment post-treatment:  [x]intact []redness- no adverse reaction 
  []redness  adverse reaction: min []Eval                  []Re-Eval  
 
 
25 min Therapeutic Exercise:  [] See flow sheet :  
Rationale: increase ROM and increase strength to improve the patients ability to perform ADL 
 
 min Therapeutic Activity:  []  See flow sheet :  
Rationale:   to improve the patients ability to  
  
 min Neuromuscular Re-education:  []  See flow sheet :  
Rationale:   to improve the patients ability to  
 
13 min Manual Therapy:  [address] mob with distraction Rationale: decrease pain, increase ROM, increase tissue extensibility and decrease edema  to perform ADL  
 
 min Gait Training:  ___ feet with ___ device on level surfaces with ___ level of assist  
Rationale: With 
 [x] TE 
 [] TA 
 [] neuro 
 [] other: Patient Education: [x] Review HEP [] Progressed/Changed HEP based on:  
[] positioning   [] body mechanics   [] transfers   [] heat/ice application   
[] other:   
 
Other Objective/Functional Measures: discomfort  Following there ex    AROM   FLex     135 ABD   120 Pain Level (0-10 scale) post treatment: 2 
 
ASSESSMENT/Changes in Function: Min/mod  Tightness  All  Plane. Pain with IR. Discussed  With pt on importance  Of  Performing  HEP  2  Xday. Improved  In AROM  Since  Initial eval. 
 
Patient will continue to benefit from skilled PT services to address functional mobility deficits, address ROM deficits, address strength deficits, analyze and address soft tissue restrictions and analyze and cue movement patterns to attain remaining goals. [x]  See Plan of Care 
[]  See progress note/recertification 
[]  See Discharge Summary Progress towards goals / Updated goals: 
Short Term Goals: To be accomplished in 5 treatments: 
             1 patient will have established and be I with HEP to aid with progression of skilled PT program 
Tyler Mata issued 
             BDSUAXU  met 2/11/19 
             2 patient will have pain 5/10 to aid with increase tolerance to ADLS and activities at home and work 
             EVAL 7 
             JRNWKMC 0 2/11/19 
  
Long Term Goals: To be accomplished in 12 treatments: 
             1 patient will have pain 3/10 to aid with increase tolerance to ADLS and activities at home and work 
             EVAL 7 
             RKLKTUL 
             2 patient will have AROM Left shoulder F 125, , ER 50 to aid with increase tolerance to overhead reaching at home and work 
             EVAL AROM Left shoulder F 75, abd 65 ER 40 
             CURRENT   Flex  135  ABD  120  
             3 patient will have MMT left shoulder MMG 4,4+/5 to aid with overhead reaching and moderate activities at home  
             EVAL NA Due to pain 
             WUZGAEN 
             4 patient will have FOTO 64 to show significant improvement with projected function at home and work 
             EVAL 49 
             FZCVZQS 
  
 
 
 
PLAN 
[]  Upgrade activities as tolerated     [x]  Continue plan of care 
[]  Update interventions per flow sheet      
[]  Discharge due to:_ 
[]  Other:_   
 
[employer], PTA 3/4/2019  5:30 PM 
 
Future Appointments Date Time Provider Glo Chavez 3/6/2019  5:30 PM Delmas Cumming, PTA [facility]  
3/11/2019  5:00 PM Delmas Cumming, PTA [facility]  
3/13/2019  5:30 PM Corin, Crystal, PTA [facility]  
3/18/2019  5:00 PM Delmas Cumming, PTA [facility]  
3/20/2019  5:30 PM Corin, Crystal, PTA [facility]  
3/25/2019  5:00 PM Corin, Crystal, PTA [facility]  
3/27/2019  5:30 PM Corin, Crystal, PTA [facility]

## 2019-03-06 ENCOUNTER — HOSPITAL ENCOUNTER (OUTPATIENT)
Dept: PHYSICAL THERAPY | Age: 44
Discharge: HOME | End: 2019-03-06
Payer: COMMERCIAL

## 2019-03-06 PROCEDURE — 97140 MANUAL THERAPY 1/> REGIONS: CPT

## 2019-03-06 PROCEDURE — 97110 THERAPEUTIC EXERCISES: CPT

## 2019-03-06 NOTE — PROGRESS NOTES
PT DAILY TREATMENT NOTE 10-18    Patient Name: Sonja Fill  Date:3/6/2019  : 1975  [x]  Patient  Verified  Payor: BLUE CROSS / Plan: [facility] [employer] [facility] [facility] [place] / Product Type: PPO /    In time:5:30  Out time:6:09  Total Treatment Time (min): 39  Visit #: 7 of 12    Medicare/BCBS Only   Total Timed Codes (min):  39 1:1 Treatment Time: 23       Treatment Area: Pain in left shoulder [M25.512]  S/P shoulder surgery [Z98.890]    SUBJECTIVE  Pain Level (0-10 scale): 0  Any medication changes, allergies to medications, adverse drug reactions, diagnosis change, or new procedure performed?: [x] No    [] Yes (see summary sheet for update)  Subjective functional status/changes:   [] No changes reported  No pain. Just  Tired.     OBJECTIVE    Modality rationale: decrease edema, decrease inflammation, decrease pain and increase tissue extensibility to improve the patients ability to perform ADL    Min Type Additional Details    [] Estim:  []Unatt       []IFC  []Premod                        []Other:  []w/ice   []w/heat  Position:  Location:    [] Estim: []Att    []TENS instruct  []NMES                    []Other:  []w/US   []w/ice   []w/heat  Position:  Location:    []  Traction: [] Cervical       []Lumbar                       [] Prone          []Supine                       []Intermittent   []Continuous Lbs:  [] before manual  [] after manual    []  Ultrasound: []Continuous   [] Pulsed                           []1MHz   []3MHz W/cm2:  Location:    []  Iontophoresis with dexamethasone         Location: [] Take home patch   [] In clinic    []  Ice     []  heat  []  Ice massage  []  Laser   []  Anodyne Position:  Location:    []  Laser with stim  []  Other:  Position:  Location:    []  Vasopneumatic Device Pressure:       [] lo [] med [] hi   Temperature: [] lo [] med [] hi   [x] Skin assessment post-treatment:  [x]intact []redness- no adverse reaction    []redness  adverse reaction:      min []Eval []Re-Eval       29 min Therapeutic Exercise:  [x] See flow sheet :   Rationale: increase ROM and increase strength to improve the patients ability to perform ADL     min Therapeutic Activity:  []  See flow sheet :   Rationale:   to improve the patients ability to       min Neuromuscular Re-education:  []  See flow sheet :   Rationale:   to improve the patients ability to     10 min Manual Therapy:  [facility] JT mob with distraction   Rationale: decrease pain, increase ROM, increase tissue extensibility and decrease edema  to perform ADL      min Gait Training:  ___ feet with ___ device on level surfaces with ___ level of assist   Rationale: With   [] TE   [] TA   [] neuro   [] other: Patient Education: [x] Review HEP    [] Progressed/Changed HEP based on:   [] positioning   [] body mechanics   [] transfers   [] heat/ice application    [] other:      Other Objective/Functional Measures: minimal  tightness     Pain Level (0-10 scale) post treatment: 0    ASSESSMENT/Changes in Function: Fair  Response  To each there ex. Patient will continue to benefit from skilled PT services to address functional mobility deficits, address ROM deficits, address strength deficits, analyze and address soft tissue restrictions and analyze and cue movement patterns to attain remaining goals.      [x]  See Plan of Care  []  See progress note/recertification  []  See Discharge Summary         Progress towards goals / Updated goals:  Short Term Goals: To be accomplished in 5 treatments:               1 patient will have established and be I with HEP to aid with progression of skilled PT program  Richie Loya issued               JLXCEZC  met 2/11/19               2 patient will have pain 5/10 to aid with increase tolerance to ADLS and activities at home and work               EVAL 7               MJOEEMV 0 2/11/19     [address] [facility] be accomplished in 12 treatments:               1 patient will have pain 3/10 to aid with increase tolerance to ADLS and activities at home and work               EVAL 7               ALXVRAP               2 patient will have AROM Left shoulder F 125, , ER 50 to aid with increase tolerance to overhead reaching at home and work               EVAL AROM Left shoulder F 75, abd 65 ER 40               CURRENT   Flex  135  ABD  120                3 patient will have MMT left shoulder MMG 4,4+/5 to aid with overhead reaching and moderate activities at home                EVAL NA Due to pain               CACJNZS               4 patient will have FOTO 64 to show significant improvement with projected function at home and work               EVAL 49      PLAN  []  Upgrade activities as tolerated     [x]  Continue plan of care  []  Update interventions per flow sheet       []  Discharge due to:_  [x]  Other:_REASSESS  MMT MELL Coombs PTA 3/6/2019  6:19 PM    Future Appointments   Date Time Provider Glo Chavez   3/11/2019  5:00 PM Claudene Hughes, PTA [facility]   3/13/2019  5:30 PM Claudene Hughes, PTA [facility]   3/18/2019  5:00 PM Claudene Hughes, PTA [facility]   3/20/2019  5:30 PM Chloe Coombs PTA [facility]   3/25/2019  5:00 PM Chloe Coombs PTA [facility]   3/27/2019  5:30 PM Chloe Coombs PTA [facility]

## 2019-03-11 ENCOUNTER — APPOINTMENT (OUTPATIENT)
Dept: PHYSICAL THERAPY | Age: 44
End: 2019-03-11
Payer: COMMERCIAL

## 2019-03-13 ENCOUNTER — HOSPITAL ENCOUNTER (OUTPATIENT)
Dept: PHYSICAL THERAPY | Age: 44
Discharge: HOME | End: 2019-03-13
Payer: COMMERCIAL

## 2019-03-13 PROCEDURE — 97110 THERAPEUTIC EXERCISES: CPT

## 2019-03-13 PROCEDURE — 97032 APPL MODALITY 1+ESTIM EA 15: CPT

## 2019-03-13 PROCEDURE — 97140 MANUAL THERAPY 1/> REGIONS: CPT

## 2019-03-13 NOTE — PROGRESS NOTES
PT DAILY TREATMENT NOTE 10-18    Patient Name: Marylee Franklin  Date:3/13/2019  : 1975  [x]  Patient  Verified  Payor: BLUE CROSS / Plan: [employer] [facility] / Product Type: PPO /    In time:  5:23Out time:6:17  Total Treatment Time (min): 53  Visit #:  8 of 12    Medicare/BCBS Only   Total Timed Codes (min):  53 1:1 Treatment Time:  48       Treatment Area: Pain in left shoulder [M25.512]  S/P shoulder surgery [Z98.890]    SUBJECTIVE  Pain Level (0-10 scale): 0  Any medication changes, allergies to medications, adverse drug reactions, diagnosis change, or new procedure performed?: [x] No    [] Yes (see summary sheet for update)  Subjective functional status/changes:   [] No changes reported  Still  Some  Pain at  Times.     OBJECTIVE    Modality rationale: decrease edema, decrease inflammation, decrease pain and increase tissue extensibility to improve the patients ability to perform ADL    Type Additional Details   [] Estim:  []Unatt       []IFC  []Premod                        []Other:  []w/ice   []w/heat  Position:  Location:   [x] Estim: [x]Att    []TENS instruct  []NMES         10           [x]Other: LTO []w/US   []w/ice   []w/heat  Position:supine  Location:left  shoulder   []  Traction: [] Cervical       []Lumbar                       [] Prone          []Supine                       []Intermittent   []Continuous Lbs:  [] before manual  [] after manual   []  Ultrasound: []Continuous   [] Pulsed                           []1MHz   []3MHz W/cm2:  Location:   []  Iontophoresis with dexamethasone         Location: [] Take home patch   [] In clinic   []  Ice     []  heat  []  Ice massage  []  Laser   []  Anodyne Position:  Location:   []  Laser with stim  []  Other:  Position:  Location:   []  Vasopneumatic Device Pressure:       [] lo [] med [] hi   Temperature: [] lo [] med [] hi   [x] Skin assessment post-treatment:  [x]intact []redness- no adverse reaction    []redness  adverse reaction: min []Eval                  []Re-Eval       38 min Therapeutic Exercise:  [] See flow sheet :   Rationale: increase ROM and increase strength to improve the patients ability to perform ADL     min Therapeutic Activity:  []  See flow sheet :   Rationale:   to improve the patients ability to       min Neuromuscular Re-education:  []  See flow sheet :   Rationale:   to improve the patients ability to     5 min Manual Therapy: [address]  J[address] mob with distraction    Rationale: decrease pain, increase ROM, increase tissue extensibility and decrease edema  to perform ADL     min Gait Training:  ___ feet with ___ device on level surfaces with ___ level of assist   Rationale: With   [x] TE   [] TA   [] neuro   [] other: Patient Education: [x] Review HEP    [] Progressed/Changed HEP based on:   [] positioning   [] body mechanics   [] transfers   [] heat/ice application    [] other: REASSESS GOALS     Other Objective/Functional Measures: FOTO:  63    MMT   Shoulder left  Flex  4-   ABD 3+       ER   4+   AROM Flex  135  ABD  120       Pain Level (0-10 scale) post treatment: 0    ASSESSMENT/Changes in Function: Mrs. Philip Renee  Reports 80-85%. Pain level  On average  Is  2. MMT and  ROM  Have  Improved. Patient will continue to benefit from skilled PT services to address functional mobility deficits, address ROM deficits, address strength deficits, analyze and address soft tissue restrictions, analyze and cue movement patterns and instruct in home and community integration to attain remaining goals.      [x]  See Plan of Care  []  See progress note/recertification  []  See Discharge Summary         Progress towards goals / Updated goals:  Short Term Goals: To be accomplished in 5 treatments:               1 patient will have established and be I with HEP to aid with progression of skilled PT program  Rosalie Manzanares issued               UKPJDOM  met 2/11/19               2 patient will have pain 5/10 to aid with increase tolerance to ADLS and activities at home and work               EVAL 7               CURRENT 2  3/13/19  [address] be accomplished in 12 treatments:               1 patient will have pain 3/10 to aid with increase tolerance to ADLS and activities at home and work               EVAL 7               CURRENT  2  3/13/19               2 patient will have AROM Left shoulder F 125, , ER 50 to aid with increase tolerance to overhead reaching at home and work               EVAL AROM Left shoulder F 75, abd 65 ER 40               MPGQITM   AROM  Flex  135  ABD  120  3/13/19  Short Term Goals: To be accomplished in 5 treatments:               1 patient will have established and be I with HEP to aid with progression of skilled PT program               EVAL issued               EVY  met 2/11/19               2 patient will have pain 5/10 to aid with increase tolerance to ADLS and activities at home and work               EVAL 7               CURRENT 0 2/11/19     [address] [facility] be accomplished in 12 treatments:               1 patient will have pain 3/10 to aid with increase tolerance to ADLS and activities at home and work               EVAL 7               HXKODIC               2 patient will have AROM Left shoulder F 125, , ER 50 to aid with increase tolerance to overhead reaching at home and work               EVAL AROM Left shoulder F 75, abd 65 ER 40               RIPKYTF   Flex  488  ABD  120                3 patient will have MMT left shoulder MMG 4,4+/5 to aid with overhead reaching and moderate activities at home                EVAL NA Due to pain               CURRENTMT   Shoulder left  Flex  4-   ABD 3+       ER   4+   AROM Flex  135  ABD  120   3/13/19                 4 patient will have FOTO 64 to show significant improvement with projected function at home and work               EVAL 52               Current  63   3/13/19                PLAN  []  Upgrade activities as tolerated []  Continue plan of care  []  Update interventions per flow sheet       []  Discharge due to:_  [x]  Other:_   FAX  MD  NOTE    Carmel Hooper, PTA 3/13/2019  5:54 PM    Future Appointments   Date Time Provider Glo Chavez   3/15/2019  7:30 AM Gabriela Delaney, PT [facility]   3/18/2019  5:00 PM Kenney Self, PTA [facility]   3/20/2019  5:30 PM Kenney Self, PTA [facility]   3/25/2019  5:00 PM Chloe Coombs, PTA [facility]   3/27/2019  5:30 PM Chloe Coombs, PTA [facility]

## 2019-03-14 NOTE — PROGRESS NOTES
[facility] [address], [address] [place] [address]  Neisha hooks, [address]  [phone] fax    Physician Update  [x] Progress Note  [] Discharge Summary  Patient name: Yusuf Green Start of Care: 19   Referral source: Darci Mcmanus MD : 1975   Medical/Treatment Diagnosis: Pain in left shoulder [M25.512]  S/P shoulder surgery [Z98.890]  Payor: BLUE CROSS / Plan: [address] [facility] [place] / Product Type: PPO /  Onset Date:2018, RCR 10/2018, ROCHELLE 19                  Prior Hospitalization: see medical history Provider#: [id number]   Medications: Verified on Patient Summary List    Comorbidities:  DM, arthritis, Left knee scope and left shoulder RCR   Prior Level of Function: Prior to ROCHELLE- was not having pain but was having LOM since left RCR Oct 2018. Household chores, community activities, working all were tolerate                Vis[facility] from [facility]: 8    Missed Visits: 1    Status at Evaluation/Last Progress Note: plan of care, evaluation  Progress towards Goals: pain 0, FOTO improved to 63. She has exercises for her HEP. MMT   Shoulder left  Flex  4-   ABD 3+       ER   4+   AROM Flex  135  ABD  120. She has 4  Visits on her original plan of care. Thank you for this referral . She demonstrates the potential to make further functional gains with strength.    Goals: to be achieved in 12 total treatments, 4 remain    1 patient will have MMT left shoulder MMG 4+,5/5 to aid with overhead reaching and moderate activities at home                PN MMT   Shoulder left  Flex  4-   ABD 3+   ER   4+                   CURRENT  4 patient will have FOTO 68 to show significant improvement with projected function at home and work            PN 63             CURRENT          ASSESSMENT/RECOMMENDATIONS:  [x]Continue therapy per initial plan/protocol at a frequency of  2-3 x per week for 12 total treatments 4 remain on plan  []Continue therapy with the following recommended changes:_____________________      _____________________________________________________________________  []Discontinue therapy progressing towards or have reached established goals  []Discontinue therapy due to lack of appreciable progress towards goals  []Discontinue therapy due to lack of attendance or compliance  []Await Physician's recommendations/decisions regarding therapy  []Other:________________________________________________________________    Thank you for this referral. Em Clements, PT 3/14/2019 7:58 AM  NOTE TO PHYSICIAN:  Dai Duke [address]   FAX TO [facility]: [phone]  If you are unable to process this request in 24 hours please contact our office: [phone]    ? I have read the above report and request that my patient continue as recommended. ? I have read the above report and request that my patient continue therapy with the following changes/special instructions:_____________________________________  ? I have read the above report and request that my patient be discharged from therapy.     [de-identified] Signature:____________Date:_________TIME:________    [employer], Date and Time must be completed for valid certification **

## 2019-03-15 ENCOUNTER — HOSPITAL ENCOUNTER (OUTPATIENT)
Dept: PHYSICAL THERAPY | Age: 44
Discharge: HOME | End: 2019-03-15
Payer: COMMERCIAL

## 2019-03-15 PROCEDURE — 97032 APPL MODALITY 1+ESTIM EA 15: CPT

## 2019-03-15 PROCEDURE — 97140 MANUAL THERAPY 1/> REGIONS: CPT

## 2019-03-15 PROCEDURE — 97110 THERAPEUTIC EXERCISES: CPT

## 2019-03-15 NOTE — PROGRESS NOTES
PT DAILY TREATMENT NOTE 10-18    Patient Name: Denilson Nelson  Date:3/15/2019  : 1975  [x]  Patient  Verified  Payor: BLUE CROSS / Plan: [employer] [facility] / Product Type: PPO /    In time:728  Out time:806  Total Treatment Time (min): 38  Visit #: 9 of 12    Medicare/BCBS Only   Total Timed Codes (min):  38 1:1 Treatment Time:  38       Treatment Area: Pain in left shoulder [M25.512]  S/P shoulder surgery [Z98.890]    SUBJECTIVE  Pain Level (0-10 scale): 0  Any medication changes, allergies to medications, adverse drug reactions, diagnosis change, or new procedure performed?: [x] No    [] Yes (see summary sheet for update)  Subjective functional status/changes:   [] No changes reported  I am doing alright. No new complaints.     OBJECTIVE    Modality rationale: increase tissue extensibility to improve the patients ability to aid with increase tolerance to ADLs and activities   Min Type Additional Details    [] Estim:  []Unatt       []IFC  []Premod                        []Other:  []w/ice   []w/heat  Position:  Location:   10 [x] Estim: [x]Att    []TENS instruct  []NMES                    [x]Other: LTO []w/US   []w/ice   []w/heat  Position:reclined  Location:left shoulder    []  Traction: [] Cervical       []Lumbar                       [] Prone          []Supine                       []Intermittent   []Continuous Lbs:  [] before manual  [] after manual    []  Ultrasound: []Continuous   [] Pulsed                           []1MHz   []3MHz W/cm2:  Location:    []  Iontophoresis with dexamethasone         Location: [] Take home patch   [] In clinic   PD []  Ice     []  heat  []  Ice massage  []  Laser   []  Anodyne Position:  Location:    []  Laser with stim  []  Other:  Position:  Location:    []  Vasopneumatic Device Pressure:       [] lo [] med [] hi   Temperature: [] lo [] med [] hi   [] Skin assessment post-treatment:  []intact []redness- no adverse reaction    []redness  adverse reaction: min []Eval                  []Re-Eval       20 min Therapeutic Exercise:  [x] See flow sheet :   Rationale: increase ROM, increase strength and improve coordination to improve the patients ability to aid with increase tolerance to ADLs and activities     min Therapeutic Activity:  []  See flow sheet :   Rationale:   to improve the patients ability to        min Neuromuscular Re-education:  []  See flow sheet :   Rationale:     8 min Manual Therapy:  LAD OSCILL, PROM all planes with gentle over stretch and mobs for IR,ER   Rationale: increase ROM and increase tissue extensibility to aid with increase tolerance to ADLS and activities     min Gait Training:  ___ feet with ___ device on level surfaces with ___ level of assist   Rationale: With   [] TE   [] TA   [] neuro   [] other: Patient Education: [x] Review HEP    [] Progressed/Changed HEP based on:   [] positioning   [] body mechanics   [] transfers   [] heat/ice application    [] other:      Other Objective/Functional Measures: VC exercises and technique. Pain Level (0-10 scale) post treatment: 0    ASSESSMENT/Changes in Function: tolerated well. Patient will continue to benefit from skilled PT services to modify and progress therapeutic interventions, address ROM deficits, address strength deficits, analyze and address soft tissue restrictions, analyze and cue movement patterns, analyze and modify body mechanics/ergonomics, assess and modify postural abnormalities and instruct in home and community integration to attain remaining goals.      [x]  See Plan of Care  [x]  See progress note/recertification  []  See Discharge Summary         Progress towards goals / Updated goals:    1 patient will have MMT left shoulder MMG 4+,5/5 to aid with overhead reaching and moderate activities at home                PN MMT   Shoulder left  Flex  4-   ABD 3+   ER   4+                CURRENT progressing 3/15/19  2 patient will have FOTO 68 to show significant improvement with projected function at home and work            PN [address]  [x]  Upgrade activities as tolerated     [x]  Continue plan of care  []  Update interventions per flow sheet       []  Discharge due to:_  []  Other:_      [place] Mo[place], PT 3/15/2019  7:31 AM    Future Appointments   Date Time Provider Glo Chavez   3/18/2019  5:00 PM Crystal Alfred PTA [facility]   3/20/2019  5:30 PM Crystal Alfred PTA [facility]   3/25/2019  5:00 PM Chloe Coombs, PTA [facility]   3/27/2019  5:30 PM Chloe Coombs, PTA [facility]

## 2019-03-18 ENCOUNTER — HOSPITAL ENCOUNTER (OUTPATIENT)
Dept: PHYSICAL THERAPY | Age: 44
Discharge: HOME | End: 2019-03-18
Payer: COMMERCIAL

## 2019-03-18 PROCEDURE — 97140 MANUAL THERAPY 1/> REGIONS: CPT

## 2019-03-18 PROCEDURE — 97110 THERAPEUTIC EXERCISES: CPT

## 2019-03-18 PROCEDURE — 97032 APPL MODALITY 1+ESTIM EA 15: CPT

## 2019-03-18 NOTE — PROGRESS NOTES
PT DAILY TREATMENT NOTE 10-18    Patient Name: Cherelle Forrest  Date:3/18/2019  : 1975  [x]  Patient  Verified  Payor: BLUE CROSS / Plan: [employer] [facility] / Product Type: PPO /    In time: 5:07  Out time:5:47  Total Treatment Time (min): 40  Visit #: 10 of 12    Medicare/BCBS Only   Total Timed Codes (min):   1:1 Treatment Time:         Treatment Area: Pain in left shoulder [M25.512]  S/P shoulder surgery [Z98.890]    SUBJECTIVE0  Pain Level (0-10 scale): 0  Any medication changes, allergies to medications, adverse drug reactions, diagnosis change, or new procedure performed?: [x] No    [] Yes (see summary sheet for update)  Subjective functional status/changes:   [] No changes reported  No pain.     OBJECTIVE    Modality rationale: decrease edema, decrease inflammation, decrease pain and increase tissue extensibility to improve the patients ability to perform ADL    Type Additional Details   [] Estim:  []Unatt       []IFC  []Premod                        []Other:  []w/ice   []w/heat  Position:  Location:   [x] Estim: [x]Att    []TENS instruct  []NMES               8     [x]Other: LTO []w/US   []w/ice   []w/heat  Position:seated  Location: left  pec  region   []  Traction: [] Cervical       []Lumbar                       [] Prone          []Supine                       []Intermittent   []Continuous Lbs:  [] before manual  [] after manual   []  Ultrasound: []Continuous   [] Pulsed                           []1MHz   []3MHz W/cm2:  Location:   []  Iontophoresis with dexamethasone         Location: [] Take home patch   [] In clinic   []  Ice     []  heat  []  Ice massage  []  Laser   []  Anodyne Position:  Location:   []  Laser with stim  []  Other:  Position:  Location:   []  Vasopneumatic Device Pressure:       [] lo [] med [] hi   Temperature: [] lo [] med [] hi   [x] Skin assessment post-treatment:  [x]intact []redness- no adverse reaction    []redness  adverse reaction:      min []Eval []Re-Eval       24 min Therapeutic Exercise:  [x] See flow sheet :   Rationale: increase ROM and increase strength to improve the patients ability to perform ADL      min Therapeutic Activity:  []  See flow sheet :   Rationale:   to improv the patients ability to       min Neuromuscular Re-education:  []  See flow sheet :   Rationale:   to improve the patients ability to     8 min Manual Therapy:  [address]  Mob  With distraction   Rationale: decrease pain, increase ROM, increase tissue extensibility and decrease edema  to perform ADL      min Gait Training:  ___ feet with ___ device on level surfaces with ___ level of assist   Rationale: With   [x] TE   [] TA   [] neuro   [] other: Patient Education: [x] Review HEP    [] Progressed/Changed HEP based on:   [] positioning   [] body mechanics   [] transfers   [] heat/ice application    [] other:      Other Objective/Functional Measures:tightness  Full  Shoulder Flex/IR      Pain Level (0-10 scale) post treatment: 2    ASSESSMENT/Changes in Function: Overall  Fair  Response  To each there ex. Patient will continue to benefit from skilled PT services to address functional mobility deficits, address ROM deficits, address strength deficits, analyze and address soft tissue restrictions and analyze and cue movement patterns to attain remaining goals.      [x]  See Plan of Care  []  See progress note/recertification  []  See Discharge Summary         Progress towards goals / Updated goals:    1 patient will have MMT left shoulder MMG 4+,5/5 to aid with overhead reaching and moderate activities at home                PN MMT   Shoulder left  Flex  4-   ABD 3+   ER   4+                CURRENT progressing 3/15/19  2 patient will have FOTO 68 to show significant improvement with projected function at home and work            PN 63           CURRENT           PLAN  []  Upgrade activities as tolerated     [x]  Continue plan of care  []  Update interventions per flow sheet       []  Discharge due to:_  []  Other:_      Chloe Coombs PTA 3/18/2019  5:22 PM    Future Appointments   Date Time Provider Glo Chavez   3/20/2019  5:30 PM Ruth Ann Guo PTA [facility]   3/25/2019  5:00 PM Ruth Ann Guo PTA [facility]   3/27/2019  5:30 PM Chloe Coombs PTA [facility]

## 2019-03-19 ENCOUNTER — HOSPITAL ENCOUNTER (OUTPATIENT)
Dept: LAB | Age: 44
Discharge: HOME | End: 2019-03-19
Payer: COMMERCIAL

## 2019-03-19 DIAGNOSIS — E11.9 TYPE 2 DIABETES MELLITUS WITHOUT COMPLICATION, UNSPECIFIED WHETHER LONG TERM INSULIN USE (HCC): ICD-10-CM

## 2019-03-19 DIAGNOSIS — M25.50 ARTHRALGIA, UNSPECIFIED JOINT: ICD-10-CM

## 2019-03-19 DIAGNOSIS — R63.5 ABNORMAL WEIGHT GAIN: ICD-10-CM

## 2019-03-19 LAB
ALBUMIN SERPL-MCNC: 2.9 G/DL (ref 3.4–5)
ALBUMIN/GLOB SERPL: 0.7 {RATIO} (ref 0.8–1.7)
ALP SERPL-CCNC: 79 U/L (ref 45–117)
ALT SERPL-CCNC: 17 U/L (ref 13–56)
ANION GAP SERPL CALC-SCNC: 8 MMOL/L (ref 3–18)
AST SERPL-CCNC: 7 U/L (ref 15–37)
BILIRUB SERPL-MCNC: 0.2 MG/DL (ref 0.2–1)
BUN SERPL-MCNC: 17 MG/DL (ref 7–18)
BUN/CREAT SERPL: 28 (ref 12–20)
CALCIUM SERPL-MCNC: 8.9 MG/DL (ref 8.5–10.1)
CHLORIDE SERPL-SCNC: 103 MMOL/L (ref 100–108)
CHOLEST SERPL-MCNC: 244 MG/DL
CO2 SERPL-SCNC: 26 MMOL/L (ref 21–32)
CREAT SERPL-MCNC: 0.61 MG/DL (ref 0.6–1.3)
ERYTHROCYTE [SEDIMENTATION RATE] IN BLOOD: 28 MM/HR (ref 0–20)
GLOBULIN SER CALC-MCNC: 4 G/DL (ref 2–4)
GLUCOSE SERPL-MCNC: 223 MG/DL (ref 74–99)
HDLC SERPL-MCNC: 51 MG/DL (ref 40–60)
HDLC SERPL: 4.8 {RATIO} (ref 0–5)
LDLC SERPL CALC-MCNC: 160 MG/DL (ref 0–100)
LIPID PROFILE,FLP: ABNORMAL
POTASSIUM SERPL-SCNC: 4.1 MMOL/L (ref 3.5–5.5)
PROT SERPL-MCNC: 6.9 G/DL (ref 6.4–8.2)
SODIUM SERPL-SCNC: 137 MMOL/L (ref 136–145)
T4 FREE SERPL-MCNC: 1.1 NG/DL (ref 0.7–1.5)
TRIGL SERPL-MCNC: 165 MG/DL (ref ?–150)
TSH SERPL DL<=0.05 MIU/L-ACNC: 2.8 UIU/ML (ref 0.36–3.74)
VLDLC SERPL CALC-MCNC: 33 MG/DL

## 2019-03-19 PROCEDURE — 80053 COMPREHEN METABOLIC PANEL: CPT

## 2019-03-19 PROCEDURE — 80061 LIPID PANEL: CPT

## 2019-03-19 PROCEDURE — 84439 ASSAY OF FREE THYROXINE: CPT

## 2019-03-19 PROCEDURE — 82043 UR ALBUMIN QUANTITATIVE: CPT

## 2019-03-19 PROCEDURE — 84443 ASSAY THYROID STIM HORMONE: CPT

## 2019-03-19 PROCEDURE — 83520 IMMUNOASSAY QUANT NOS NONAB: CPT

## 2019-03-19 PROCEDURE — 85652 RBC SED RATE AUTOMATED: CPT

## 2019-03-19 PROCEDURE — 36415 COLL VENOUS BLD VENIPUNCTURE: CPT

## 2019-03-19 PROCEDURE — 86225 DNA ANTIBODY NATIVE: CPT

## 2019-03-20 ENCOUNTER — APPOINTMENT (OUTPATIENT)
Dept: PHYSICAL THERAPY | Age: 44
End: 2019-03-20
Payer: COMMERCIAL

## 2019-03-20 LAB
CREAT UR-MCNC: 106 MG/DL (ref 30–125)
MICROALBUMIN UR-MCNC: 485 MG/DL (ref 0–3)
MICROALBUMIN/CREAT UR-RTO: 4575 MG/G (ref 0–30)

## 2019-03-21 LAB
CENTROMERE B AB SER-ACNC: <0.2 AI (ref 0–0.9)
CHROMATIN AB SERPL-ACNC: <0.2 AI (ref 0–0.9)
DSDNA AB SER-ACNC: 6 IU/ML (ref 0–9)
ENA JO1 AB SER-ACNC: <0.2 AI (ref 0–0.9)
ENA RNP AB SER-ACNC: 0.2 AI (ref 0–0.9)
ENA SCL70 AB SER-ACNC: <0.2 AI (ref 0–0.9)
ENA SM AB SER-ACNC: <0.2 AI (ref 0–0.9)
ENA SS-A AB SER-ACNC: <0.2 AI (ref 0–0.9)
ENA SS-B AB SER-ACNC: <0.2 AI (ref 0–0.9)
SEE BELOW, 164869: NORMAL

## 2019-03-25 ENCOUNTER — HOSPITAL ENCOUNTER (OUTPATIENT)
Dept: PHYSICAL THERAPY | Age: 44
Discharge: HOME | End: 2019-03-25
Payer: COMMERCIAL

## 2019-03-25 PROCEDURE — 97110 THERAPEUTIC EXERCISES: CPT

## 2019-03-25 NOTE — PROGRESS NOTES
PT DAILY TREATMENT NOTE 10-18 Patient Name: Nneka Delatorre Date:3/25/2019 : 1975 [x]  Patient  Verified Payor: BLUE CROSS / Plan: [facility] [employer] / Product Type: PPO / In time:  5:08 Out time:5:45 Total Treatment Time (min): 38 Visit #: 32 SH 71 Medicare/BCBS Only Total Timed Codes (min):  38 1:1 Treatment Time:  45 Treatment Area: Pain in left shoulder [M25.512] S/P shoulder surgery [Z98.890] SUBJECTIVE Pain Level (0-10 scale): 0 Any medication changes, allergies to medications, adverse drug reactions, diagnosis change, or new procedure performed?: [x] No    [] Yes (see summary sheet for update) Subjective functional status/changes:   [] No changes reported No pain. OBJECTIVE Modality rationale: decrease edema, decrease inflammation, decrease pain and increase tissue extensibility to improve the patients ability to perform ADL Min Type Additional Details  
 [] Estim:  []Unatt       []IFC  []Premod []Other:  []w/ice   []w/heat Position: Location:  
 [] Estim: []Att    []TENS instruct  []NMES []Other:  []w/US   []w/ice   []w/heat Position: Location:  
 []  Traction: [] Cervical       []Lumbar 
                     [] Prone          []Supine []Intermittent   []Continuous Lbs: 
[] before manual 
[] after manual  
 []  Ultrasound: []Continuous   [] Pulsed []1MHz   []3MHz W/cm2: 
Location:  
 []  Iontophoresis with dexamethasone Location: [] Take home patch  
[] In clinic  
 []  Ice     []  heat 
[]  Ice massage 
[]  Laser  
[]  Anodyne Position: Location:  
 []  Laser with stim 
[]  Other:  Position: Location:  
 []  Vasopneumatic Device Pressure:       [] lo [] med [] hi  
Temperature: [] lo [] med [] hi  
[x] Skin assessment post-treatment:  [x]intact []redness- no adverse reaction 
  []redness  adverse reaction: min []Eval                  []Re-Eval  
 
 
30 min Therapeutic Exercise:  [] See flow sheet :  
Rationale: increase ROM and increase strength to improve the patients ability to perform ADL  
 
 min Therapeutic Activity:  []  See flow sheet :  
Rationale:   to improve the patients ability to  
  
 min Neuromuscular Re-education:  []  See flow sheet :  
Rationale:   to improve the patients ability to  
 
8 min Manual Therapy:  [facility] [address]  Mob Rationale: decrease pain, increase ROM, increase tissue extensibility and decrease edema  to perform ADL  
 
 min Gait Training:  ___ feet with ___ device on level surfaces with ___ level of assist  
Rationale: With 
 [x] TE 
 [] TA 
 [] neuro 
 [] other: Patient Education: [x] Review HEP [] Progressed/Changed HEP based on:  
[] positioning   [] body mechanics   [] transfers   [] heat/ice application   
[] other:   
 
Other Objective/Functional Measures: added  theraband  There ex. Tightness  Full  Shoulder  Flex/IR Pain Level (0-10 scale) post treatment:0  
 
ASSESSMENT/Changes in Function: challenged  With theraband  Flex/ABD. Overall  Fair  Response  To remaining  There ex. Patient will continue to benefit from skilled PT services to address functional mobility deficits, address ROM deficits, address strength deficits, analyze and address soft tissue restrictions, analyze and cue movement patterns and instruct in home and community integration to attain remaining goals. [x]  See Plan of Care 
[]  See progress note/recertification 
[]  See Discharge Summary Progress towards goals / Updated goals: 
  1 patient will have MMT left shoulder MMG 4+,5/5 to aid with overhead reaching and moderate activities at home  
             PN MMT   Shoulder left  Flex  4-   ABD 3+   ER   4+ 
              CURRENT progressing 3/15/19 
2 patient will have IPUU 00 BF show significant improvement with projected function at home and work 
          PN 63 
          CURRENT 
  
 
 
 
PLAN 
[]  Upgrade activities as tolerated     [x]  Continue plan of care 
[]  Update interventions per flow sheet      
[]  Discharge due to:_ 
[]  Other:_   
 
Chloe Coombs PTA 3/25/2019  5:09 PM 
 
Future Appointments Date Time Provider Glo Chavez 3/27/2019  5:30 PM Chloe Coombs PTA [facility]

## 2019-03-27 ENCOUNTER — HOSPITAL ENCOUNTER (OUTPATIENT)
Dept: PHYSICAL THERAPY | Age: 44
Discharge: HOME | End: 2019-03-27
Payer: COMMERCIAL

## 2019-03-27 PROCEDURE — 97140 MANUAL THERAPY 1/> REGIONS: CPT

## 2019-03-27 PROCEDURE — 97110 THERAPEUTIC EXERCISES: CPT

## 2019-03-27 NOTE — PROGRESS NOTES
PT DAILY TREATMENT NOTE 10-18 Patient Name: Shilpi Otoole Date:3/27/2019 : 1975 [x]  Patient  Verified Payor: BLUE CROSS / Plan: [place] [employer] / Product Type: PPO / In time: 5:20  Out time:5:52 Total Treatment Time (min): 32 Visit #: 3 of 12  Renumbered  From  MD  note Medicare/BCBS Only Total Timed Codes (min):  32 1:1 Treatment Time:  32 Treatment Area: Pain in left shoulder [M25.512] S/P shoulder surgery [Z98.890] SUBJECTIVE Pain Level (0-10 scale): 0 Any medication changes, allergies to medications, adverse drug reactions, diagnosis change, or new procedure performed?: [x] No    [] Yes (see summary sheet for update) Subjective functional status/changes:   [] No changes reported No pain. OBJECTIVE Modality rationale: decrease edema to improve the patients ability to perform ADL Min Type Additional Details  
 [] Estim:  []Unatt       []IFC  []Premod []Other:  []w/ice   []w/heat Position: Location:  
 [] Estim: []Att    []TENS instruct  []NMES []Other:  []w/US   []w/ice   []w/heat Position: Location:  
 []  Traction: [] Cervical       []Lumbar 
                     [] Prone          []Supine []Intermittent   []Continuous Lbs: 
[] before manual 
[] after manual  
 []  Ultrasound: []Continuous   [] Pulsed []1MHz   []3MHz W/cm2: 
Location:  
 []  Iontophoresis with dexamethasone Location: [] Take home patch  
[] In clinic  
 []  Ice     []  heat 
[]  Ice massage 
[]  Laser  
[]  Anodyne Position: Location:  
 []  Laser with stim 
[]  Other:  Position: Location:  
 []  Vasopneumatic Device Pressure:       [] lo [] med [] hi  
Temperature: [] lo [] med [] hi  
[x] Skin assessment post-treatment:  [x]intact []redness- no adverse reaction 
  []redness  adverse reaction:  
 
 min []Eval                  []Re-Eval  
 
 
 24 min Therapeutic Exercise:  [x] See flow sheet :  
Rationale: increase ROM and increase strength to improve the patients ability to perform ADL  
 
 min Therapeutic Activity:  []  See flow sheet :  
Rationale:   to improve the patients ability to  
  
 min Neuromuscular Re-education:  []  See flow sheet :  
Rationale:   to improve the patients ability to  
 
8 min Manual Therapy:    
Rationale: decrease pain, increase ROM, increase tissue extensibility and decrease edema  to perform ADL  
 
 min Gait Training:  ___ feet with ___ device on level surfaces with ___ level of assist  
Rationale: With 
 [x] TE 
 [] TA 
 [] neuro 
 [] other: Patient Education: [x] Review HEP [] Progressed/Changed HEP based on:  
[] positioning   [] body mechanics   [] transfers   [] heat/ice application   
[] other:   
 
Other Objective/Functional Measures: pt  Demo  weakness   Left  shoulder Pain Level (0-10 scale) post treatment: 0 
 
ASSESSMENT/Changes in Function: Discussed  With pt on importance  Of  Performing  Strengthening  There ex  At  Home. Patient will continue to benefit from skilled PT services to address functional mobility deficits, address ROM deficits, address strength deficits, analyze and address soft tissue restrictions and analyze and cue movement patterns to attain remaining goals. [x]  See Plan of Care 
[]  See progress note/recertification 
[]  See Discharge Summary Progress towards goals / Updated goals: 
  1 patient will have MMT left shoulder MMG 4+,5/5 to aid with overhead reaching and moderate activities at home  
             PN MMT   Shoulder left  Flex  4-   ABD 3+   ER   4+ 
              CURRENT progressing 3/15/19 
2 patient will have FOTO 68 to show significant improvement with projected function at home and work 
          PN 63 
         CURRENT 
  
  
 
 
 
PLAN 
[]  Upgrade activities as tolerated     [x]  Continue plan of care []  Update interventions per flow sheet      
[]  Discharge due to:_ 
[]  Other:_   
 
Chloe Coombs PTA 3/27/2019  5:25 PM 
 
Future Appointments Date Time Provider Glo Chavez 3/27/2019  5:30 PM [facility]

## 2019-03-28 LAB
14.3.3 ETA, RHEUM. ARTHRITIS: <0.2 NG/ML
CCP IGA+IGG SERPL IA-ACNC: <20 UNITS
RHEUMATOID FACT SERPL-ACNC: <14 UNITS/ML

## 2019-04-01 ENCOUNTER — HOSPITAL ENCOUNTER (OUTPATIENT)
Dept: PHYSICAL THERAPY | Age: 44
Discharge: HOME | End: 2019-04-01
Payer: COMMERCIAL

## 2019-04-01 PROCEDURE — 97110 THERAPEUTIC EXERCISES: CPT

## 2019-04-01 NOTE — PROGRESS NOTES
PT DAILY TREATMENT NOTE 10-18 Patient Name: Nghia Redmond Date:2019 : 1975 [x]  Patient  Verified Payor: BLUE CROSS / Plan: [place] [employer] / Product Type: PPO / In time:  7:59  Out time:8:29 Total Treatment Time (min): 29 Visit #: 5 of 12 Medicare/BCBS Only Total Timed Codes (min):  29 1:1 Treatment Time:  34 Treatment Area: Pain in left shoulder [M25.512] S/P shoulder surgery [Z98.890] SUBJECTIVE Pain Level (0-10 scale): Any medication changes, allergies to medications, adverse drug reactions, diagnosis change, or new procedure performed?: [x] No    [] Yes (see summary sheet for update) Subjective functional status/changes:   [] No changes reported OBJECTIVE Modality rationale: decrease edema, decrease inflammation, decrease pain and increase tissue extensibility to improve the patients ability to perform ADL Min Type Additional Details  
 [] Estim:  []Unatt       []IFC  []Premod []Other:  []w/ice   []w/heat Position: Location:  
 [] Estim: []Att    []TENS instruct  []NMES []Other:  []w/US   []w/ice   []w/heat Position: Location:  
 []  Traction: [] Cervical       []Lumbar 
                     [] Prone          []Supine []Intermittent   []Continuous Lbs: 
[] before manual 
[] after manual  
 []  Ultrasound: []Continuous   [] Pulsed []1MHz   []3MHz W/cm2: 
Location:  
 []  Iontophoresis with dexamethasone Location: [] Take home patch  
[] In clinic  
 []  Ice     []  heat 
[]  Ice massage 
[]  Laser  
[]  Anodyne Position: Location:  
 []  Laser with stim 
[]  Other:  Position: Location:  
 []  Vasopneumatic Device Pressure:       [] lo [] med [] hi  
Temperature: [] lo [] med [] hi  
[x] Skin assessment post-treatment:  [x]intact []redness- no adverse reaction 
  []redness  adverse reaction:  
 
 min []Eval                  []Re-Eval  
 
 
 23 min Therapeutic Exercise:  [x] See flow sheet :  
Rationale: increase ROM and increase strength to improve the patients ability to perform ADL  
 
 min Therapeutic Activity:  []  See flow sheet :  
Rationale:   to improve the patients ability to  
  
 min Neuromuscular Re-education:  []  See flow sheet :  
Rationale:   to improve the patients ability to  
 
6 min Manual Therapy:  [address] [facility]  [address]  Mob with distraction Rationale: decrease pain, increase ROM, increase tissue extensibility and decrease edema  to perform ADL  
 
 min Gait Training:  ___ feet with ___ device on level surfaces with ___ level of assist  
Rationale: With 
 [x] TE 
 [] TA 
 [] neuro 
 [] other: Patient Education: [x] Review HEP [] Progressed/Changed HEP based on:  
[] positioning   [] body mechanics   [] transfers   [] heat/ice application   
[] other:   
 
Other Objective/Functional Measures: minimal  Tightness  All  plane Pain Level (0-10 scale) post treatment: <3 
 
ASSESSMENT/Changes in Function: Fair  Response  To each there ex. Patient will continue to benefit from skilled PT services to address functional mobility deficits, address ROM deficits, address strength deficits, analyze and address soft tissue restrictions, analyze and cue movement patterns and instruct in home and community integration to attain remaining goals. []  See Plan of Care 
[]  See progress note/recertification 
[]  See Discharge Summary Progress towards goals / Updated goals: 
  1 patient will have MMT left shoulder MMG 4+,5/5 to aid with overhead reaching and moderate activities at home  
             PN MMT   Shoulder left  Flex  4-   ABD 3+   ER   4+ 
              CURRENT progressing 3/15/19 
2 patient will have FOTO 68 to show significant improvement with projected function at home and work 
          PN 63 
         CURRENT 
  
  
 
 
 
PLAN 
[]  Upgrade activities as tolerated     []  Continue plan of care []  Update interventions per flow sheet      
[]  Discharge due to:_ 
[x]  Other:_ REASSESS MMT  NV  
 
Chloe Coombs PTA 4/1/2019  8:19 AM 
 
Future Appointments Date Time Provider Glo Chavez 4/3/2019  7:30 AM Alice Tapia, PT [facility]  
4/4/2019  5:00 PM Gallup Reina, PTA [facility]  
4/8/2019  7:30 AM Cristina Huang, PTA [facility]  
4/11/2019  7:30 AM [facility]  
4/15/2019  7:30 AM Chloe Coombs, PTA [facility]  
4/17/2019  7:30 AM Alice Tapia, PT [facility]  
4/22/2019  5:30 PM Romilda Poag, PT [facility]  
4/25/2019  5:30 PM Chloe Coombs, PTA [facility]

## 2019-04-01 NOTE — PROGRESS NOTES
PT DAILY TREATMENT NOTE 10-18 Patient Name: Harrison Forrest Date:2019 : 1975 [x]  Patient  Verified Payor: BLUE CROSS / Plan: [place]UE [place]OSS [employer]ER[place] / Product Type: PPO / In time:  Out time: Total Treatment Time (min):  
Visit #: 4 of 12 Medicare/BCBS Only Total Timed Codes (min):   1:1 Treatment Time:    
 
 
Treatment Area: Pain in left shoulder [M25.512] S/P shoulder surgery [Z98.890] SUBJECTIVE Pain Level (0-10 scale): Any medication changes, allergies to medications, adverse drug reactions, diagnosis change, or new procedure performed?: [x] No    [] Yes (see summary sheet for update) Subjective functional status/changes:   [] No changes reported OBJECTIVE Modality rationale: decrease edema, decrease inflammation, decrease pain and increase tissue extensibility to improve the patients ability to perform ADL Min Type Additional Details  
 [] Estim:  []Unatt       []IFC  []Premod []Other:  []w/ice   []w/heat Position: Location:  
 [] Estim: []Att    []TENS instruct  []NMES []Other:  []w/US   []w/ice   []w/heat Position: Location:  
 []  Traction: [] Cervical       []Lumbar 
                     [] Prone          []Supine []Intermittent   []Continuous Lbs: 
[] before manual 
[] after manual  
 []  Ultrasound: []Continuous   [] Pulsed []1MHz   []3MHz W/cm2: 
Location:  
 []  Iontophoresis with dexamethasone Location: [] Take home patch  
[] In clinic  
 []  Ice     []  heat 
[]  Ice massage 
[]  Laser  
[]  Anodyne Position: Location:  
 []  Laser with stim 
[]  Other:  Position: Location:  
 []  Vasopneumatic Device Pressure:       [] lo [] med [] hi  
Temperature: [] lo [] med [] hi  
[x] Skin assessment post-treatment:  [x]intact []redness- no adverse reaction 
  []redness  adverse reaction:  
 
 min []Eval                  []Re-Eval  
 
 
 min Therapeutic Exercise:  [] See flow sheet :  
Rationale: increase ROM and increase strength to improve the patients ability to perform ADL  
 
 min Therapeutic Activity:  []  See flow sheet :  
Rationale:   to improve the patients ability to  
  
 min Neuromuscular Re-education:  []  See flow sheet :  
Rationale:   to improve the patients ability to  
 
 min Manual Therapy:    
Rationale: decrease pain, increase ROM, increase tissue extensibility and decrease edema  to perform ADL 
 
 min Gait Training:  ___ feet with ___ device on level surfaces with ___ level of assist  
Rationale: With 
 [x] TE 
 [] TA 
 [] neuro 
 [] other: Patient Education: [x] Review HEP [] Progressed/Changed HEP based on:  
[] positioning   [] body mechanics   [] transfers   [] heat/ice application   
[] other:   
 
Other Objective/Functional Measures:   
 
Pain Level (0-10 scale) post treatment:  
 
ASSESSMENT/Changes in Function:  
 
Patient will continue to benefit from skilled PT services to address functional mobility deficits, address ROM deficits, address strength deficits, analyze and address soft tissue restrictions, analyze and cue movement patterns and instruct in home and community integration to attain remaining goals. [x]  See Plan of Care 
[]  See progress note/recertification 
[]  See Discharge Summary Progress towards goals / Updated goals: 
  1 patient will have MMT left shoulder MMG 4+,5/5 to aid with overhead reaching and moderate activities at home  
             PN MMT   Shoulder left  Flex  4-   ABD 3+   ER   4+ 
              CURRENT progressing 3/15/19 
2 patient will have FOTO 68 to show significant improvement with projected function at home and work 
          PN 63 
         CURRENT 
  
  
  
 
 
 
PLAN 
[]  Upgrade activities as tolerated     []  Continue plan of care 
[]  Update interventions per flow sheet      
[]  Discharge due to:_ 
[]  Other:_   
 
 Chloe Pino PTA 4/1/2019  8:11 AM 
 
Future Appointments Date Time Provider Glo Chavez 4/3/2019  7:30 AM Sophia Garcia, PT [facility]  
4/4/2019  5:00 PM Star Valerio, PTA [facility]  
4/8/2019  7:30 AM Star Valerio, PTA [facility]  
4/11/2019  7:30 AM [facility]  
4/15/2019  7:30 AM Chloe Coombs, PTA [facility]  
4/17/2019  7:30 AM Sophia Garcia, PT [facility]  
4/22/2019  5:30 PM Cresencio Preston, PT [facility]  
4/25/2019  5:30 PM Chloe Coombs, PTA [facility]

## 2019-04-04 ENCOUNTER — HOSPITAL ENCOUNTER (OUTPATIENT)
Dept: PHYSICAL THERAPY | Age: 44
Discharge: HOME | End: 2019-04-04
Payer: COMMERCIAL

## 2019-04-04 PROCEDURE — 97110 THERAPEUTIC EXERCISES: CPT

## 2019-04-04 PROCEDURE — 97140 MANUAL THERAPY 1/> REGIONS: CPT

## 2019-04-04 NOTE — PROGRESS NOTES
PT DAILY TREATMENT NOTE 10-18 Patient Name: Joanna Dumont Date:2019 : 1975 [x]  Patient  Verified Payor: BLUE CROSS / Plan: [place] BLUE CROSS [employer] / Product Type: PPO / In time:  5:03 Out time:5:32 Total Treatment Time (min): 29 Visit #: 6 of 12 Medicare/BCBS Only Total Timed Codes (min):  29 1:1 Treatment Time:  34 Treatment Area: Pain in left shoulder [M25.512] S/P shoulder surgery [Z98.890] SUBJECTIVE Pain Level (0-10 scale): Any medication changes, allergies to medications, adverse drug reactions, diagnosis change, or new procedure performed?: [x] No    [] Yes (see summary sheet for update) Subjective functional status/changes:   [] No changes reported OBJECTIVE Modality rationale: decrease edema, decrease inflammation, decrease pain and increase tissue extensibility to improve the patients ability to perform ADL Min Type Additional Details  
 [] Estim:  []Unatt       []IFC  []Premod []Other:  []w/ice   []w/heat Position: Location:  
 [] Estim: []Att    []TENS instruct  []NMES []Other:  []w/US   []w/ice   []w/heat Position: Location:  
 []  Traction: [] Cervical       []Lumbar 
                     [] Prone          []Supine []Intermittent   []Continuous Lbs: 
[] before manual 
[] after manual  
 []  Ultrasound: []Continuous   [] Pulsed []1MHz   []3MHz W/cm2: 
Location:  
 []  Iontophoresis with dexamethasone Location: [] Take home patch  
[] In clinic  
 []  Ice     []  heat 
[]  Ice massage 
[]  Laser  
[]  Anodyne Position: Location:  
 []  Laser with stim 
[]  Other:  Position: Location:  
 []  Vasopneumatic Device Pressure:       [] lo [] med [] hi  
Temperature: [] lo [] med [] hi  
[x] Skin assessment post-treatment:  [x]intact []redness- no adverse reaction 
  []redness  adverse reaction:  
 
 min []Eval                  []Re-Eval  
 
 
 21 min Therapeutic Exercise:  [] See flow sheet :  
Rationale: increase ROM and increase strength to improve the patients ability to perform ADL  
 
 min Therapeutic Activity:  []  See flow sheet :  
Rationale:   to improve the patients ability to  
  
 min Neuromuscular Re-education:  []  See flow sheet :  
Rationale:   to improve the patients ability to  
 
8 min Manual Therapy:  [address] [facility] [address]  Mob with distraction Rationale: decrease pain, increase ROM, increase tissue extensibility and decrease edema  to perform ADL 
 
 min Gait Training:  ___ feet with ___ device on level surfaces with ___ level of assist  
Rationale: With 
 [x] TE 
 [] TA 
 [] neuro 
 [] other: Patient Education: [x] Review HEP [] Progressed/Changed HEP based on:  
[] positioning   [] body mechanics   [] transfers   [] heat/ice application   
[] other:   
 
Other Objective/Functional Measures: pt  Performed  Standing  There ex  in seated  Position  Due  To H/A   held  theraband  There ex. Pain Level (0-10 scale) post treatment:  
 
ASSESSMENT/Changes in Function: Minima  Pain at anterior  Shoulder  With manual. Short  Session due  To H/A. Patient will continue to benefit from skilled PT services to address functional mobility deficits, address ROM deficits, address strength deficits, analyze and address soft tissue restrictions, analyze and cue movement patterns and instruct in home and community integration to attain remaining goals. [x]  See Plan of Care 
[]  See progress note/recertification 
[]  See Discharge Summary Progress towards goals / Updated goals: 
  1 patient will have MMT left shoulder MMG 4+,5/5 to aid with overhead reaching and moderate activities at home  
             PN MMT   Shoulder left  Flex  4-   ABD 3+   ER   4+ 
              CURRENT progressing 3/15/19 
2 patient will have INPW 67 IV show significant improvement with projected function at home and work 
          PN 63 
         CURRENT 
 PLAN 
[]  Upgrade activities as tolerated     [x]  Continue plan of care 
[]  Update interventions per flow sheet      
[]  Discharge due to:_ 
[]  Other:_   
 
Chloe Coombs PTA 4/4/2019  5:08 PM 
 
Future Appointments Date Time Provider Glo Chavez 4/8/2019  7:30 AM Pamela Skelton PTA [facility]  
4/11/2019  7:30 AM [facility] PT [place] [id number] [facility]  
4/15/2019  7:30 AM Chloe Coombs PTA [facility]  
4/17/2019  7:30 AM Kee Lam, PT [facility]  
4/22/2019  5:30 PM Lindy Ivey PT [facility]  
4/25/2019  5:30 PM Chloe Coombs PTA [facility]

## 2019-04-07 DIAGNOSIS — E11.9 TYPE 2 DIABETES MELLITUS WITHOUT COMPLICATION, UNSPECIFIED WHETHER LONG TERM INSULIN USE (HCC): ICD-10-CM

## 2019-04-08 ENCOUNTER — HOSPITAL ENCOUNTER (OUTPATIENT)
Dept: PHYSICAL THERAPY | Age: 44
Discharge: HOME | End: 2019-04-08
Payer: COMMERCIAL

## 2019-04-08 PROCEDURE — 97110 THERAPEUTIC EXERCISES: CPT

## 2019-04-08 PROCEDURE — 97140 MANUAL THERAPY 1/> REGIONS: CPT

## 2019-04-08 RX ORDER — INSULIN DEGLUDEC 200 U/ML
10 INJECTION, SOLUTION SUBCUTANEOUS DAILY
Qty: 1 ADJUSTABLE DOSE PRE-FILLED PEN SYRINGE | Refills: 3 | Status: SHIPPED | OUTPATIENT
Start: 2019-04-08 | End: 2019-04-09 | Stop reason: SDUPTHER

## 2019-04-08 NOTE — PROGRESS NOTES
PT DAILY TREATMENT NOTE 10-18 Patient Name: Rod Gillis Date:2019 : 1975 [x]  Patient  Verified Payor: BLUE CROSS / Plan: [place] BLUE CROSS [employer]ERAL / Product Type: PPO / In time:7:37   Out time:8:18 Total Treatment Time (min): 42 Visit #: 7 of 12 Medicare/BCBS Only Total Timed Codes (min):  42 1:1 Treatment Time:  42 Treatment Area: Pain in left shoulder [M25.512] S/P shoulder surgery [Z98.890] SUBJECTIVE Pain Level (0-10 scale): 0 Any medication changes, allergies to medications, adverse drug reactions, diagnosis change, or new procedure performed?: [x] No    [] Yes (see summary sheet for update) Subjective functional status/changes:   [] No changes reported No pain. OBJECTIVE Modality rationale: decrease edema, decrease inflammation, decrease pain and increase tissue extensibility to improve the patients ability to perform ADL Min Type Additional Details  
 [] Estim:  []Unatt       []IFC  []Premod []Other:  []w/ice   []w/heat Position: Location:  
 [] Estim: []Att    []TENS instruct  []NMES []Other:  []w/US   []w/ice   []w/heat Position: Location:  
 []  Traction: [] Cervical       []Lumbar 
                     [] Prone          []Supine []Intermittent   []Continuous Lbs: 
[] before manual 
[] after manual  
 []  Ultrasound: []Continuous   [] Pulsed []1MHz   []3MHz W/cm2: 
Location:  
 []  Iontophoresis with dexamethasone Location: [] Take home patch  
[] In clinic  
 []  Ice     []  heat 
[]  Ice massage 
[]  Laser  
[]  Anodyne Position: Location:  
 []  Laser with stim 
[]  Other:  Position: Location:  
 []  Vasopneumatic Device Pressure:       [] lo [] med [] hi  
Temperature: [] lo [] med [] hi  
[x] Skin assessment post-treatment:  [x]intact []redness- no adverse reaction 
  []redness  adverse reaction:  
 
 min []Eval                  []Re-Eval  
 27 min Therapeutic Exercise:  [] See flow sheet :  
Rationale: increase ROM and increase strength to improve the patients ability to perform ADL 
 
 min Therapeutic Activity:  []  See flow sheet :  
Rationale:   to improve the patients ability to  
  
 min Neuromuscular Re-education:  []  See flow sheet :  
Rationale:   to improve the patients ability to  
 
15 min Manual Therapy:  [facility] [address]  Mob  With distraction/CFM  pec  Left  Pec/anterior  deltiod Rationale: decrease pain, increase ROM, increase tissue extensibility and decrease edema  to perform ADL  
 
 min Gait Training:  ___ feet with ___ device on level surfaces with ___ level of assist  
Rationale: With 
 [x] TE 
 [] TA 
 [] neuro 
 [] other: Patient Education: [x] Review HEP [] Progressed/Changed HEP based on:  
[] positioning   [] body mechanics   [] transfers   [] heat/ice application   
[] other:   
 
Other Objective/Functional Measures: pain  At  Anterior  Shoulder  With manual  
 
Pain Level (0-10 scale) post treatment:0  
 
ASSESSMENT/Changes in Function: Fair  Response  To each there ex. Patient will continue to benefit from skilled PT services to address functional mobility deficits, address ROM deficits, address strength deficits, analyze and address soft tissue restrictions and analyze and cue movement patterns to attain remaining goals. [x]  See Plan of Care 
[]  See progress note/recertification 
[]  See Discharge Summary Progress towards goals / Updated goals: 
  1 patient will have MMT left shoulder MMG 4+,5/5 to aid with overhead reaching and moderate activities at home  
             PN MMT   Shoulder left  Flex  4-   ABD 3+   ER   4+ 
              CURRENT progressing 3/15/19 
2 patient will have MCHI 88 XP show significant improvement with projected function at home and work 
          PN 63 
         CURRENT 
  
 
 
 
PLAN 
[]  Upgrade activities as tolerated     [x]  Continue plan of care []  Update interventions per flow sheet      
[]  Discharge due to:_ 
[x]  Other:_   
 
Chloe Coombs PTA 4/8/2019  7:44 AM 
 
Future Appointments Date Time Provider Glo Chavez 4/11/2019  7:30 AM [facility] PT [place] [id number] [facility]  
4/15/2019  7:30 AM Chloe Coombs PTA [facility]  
4/17/2019  7:30 AM Michel Bruno, PT [facility]  
4/22/2019  5:30 PM Angel Hall, PT [facility]  
4/25/2019  5:30 PM Chloe Coombs PTA [facility]

## 2019-04-09 DIAGNOSIS — E11.9 TYPE 2 DIABETES MELLITUS WITHOUT COMPLICATION, UNSPECIFIED WHETHER LONG TERM INSULIN USE (HCC): ICD-10-CM

## 2019-04-09 RX ORDER — INSULIN DEGLUDEC 200 U/ML
20 INJECTION, SOLUTION SUBCUTANEOUS DAILY
Qty: 9 ML | Refills: 3 | Status: SHIPPED | OUTPATIENT
Start: 2019-04-09 | End: 2019-07-02 | Stop reason: SDUPTHER

## 2019-04-11 ENCOUNTER — HOSPITAL ENCOUNTER (OUTPATIENT)
Dept: PHYSICAL THERAPY | Age: 44
Discharge: HOME | End: 2019-04-11
Payer: COMMERCIAL

## 2019-04-11 PROCEDURE — 97140 MANUAL THERAPY 1/> REGIONS: CPT

## 2019-04-11 PROCEDURE — 97110 THERAPEUTIC EXERCISES: CPT

## 2019-04-11 NOTE — PROGRESS NOTES
PT DAILY TREATMENT NOTE 10-18 Patient Name: Fred Rivera Date:2019 : 1975 [x]  Patient  Verified Payor: BLUE CROSS / Plan: [place] [place] [employer] / Product Type: PPO / In time:7:30  Out time:8:26 Total Treatment Time (min): 56 Visit #: 8 of 12 Medicare/BCBS Only Total Timed Codes (min):  46 1:1 Treatment Time:  35 Treatment Area: Pain in left shoulder [M25.512] S/P shoulder surgery [Z98.890] SUBJECTIVE Pain Level (0-10 scale): 5 with movement Any medication changes, allergies to medications, adverse drug reactions, diagnosis change, or new procedure performed?: [x] No    [] Yes (see summary sheet for update) Subjective functional status/changes:   [] No changes reported Pt reports having more pain with lifting her arm today, not sure why OBJECTIVE Modality rationale: decrease pain to improve the patients ability to perform daily tasks Min Type Additional Details  
 [] Estim:  []Unatt       []IFC  []Premod []Other:  []w/ice   []w/heat Position: Location:  
 [] Estim: []Att    []TENS instruct  []NMES []Other:  []w/US   []w/ice   []w/heat Position: Location:  
 []  Traction: [] Cervical       []Lumbar 
                     [] Prone          []Supine []Intermittent   []Continuous Lbs: 
[] before manual 
[] after manual  
 []  Ultrasound: []Continuous   [] Pulsed []1MHz   []3MHz W/cm2: 
Location:  
 []  Iontophoresis with dexamethasone Location: [] Take home patch  
[] In clinic  
10 [x]  Ice     []  heat 
[]  Ice massage 
[]  Laser  
[]  Anodyne Position: supine Location: left shoulder  
 []  Laser with stim 
[]  Other:  Position: Location:  
 []  Vasopneumatic Device Pressure:       [] lo [] med [] hi  
Temperature: [] lo [] med [] hi  
[] Skin assessment post-treatment:  []intact []redness- no adverse reaction 
  []redness  adverse reaction: 38 min Therapeutic Exercise:  [x] See flow sheet :  
Rationale: increase ROM and increase strength to improve the patients ability to perform ADLs 8 min Manual Therapy:  Post/inf GH jt mobs with left shoulder PROM. DTM to pec Rationale: decrease pain, increase ROM and increase tissue extensibility to improve functional mobility With 
 [] TE 
 [] TA 
 [] neuro 
 [] other: Patient Education: [x] Review HEP [] Progressed/Changed HEP based on:  
[] positioning   [] body mechanics   [] transfers   [] heat/ice application   
[] other:   
 
Other Objective/Functional Measures: Added ball up wall with lift off and incr'd reps per flow sheet Shoulder hike noted with full can Pain Level (0-10 scale) post treatment: 5 
 
ASSESSMENT/Changes in Function: Pt was challenged well with therex today. Noted TTP and ms tension at pec. Vc's to avoid shoulder hike with active shoulder flex. Patient will continue to benefit from skilled PT services to modify and progress therapeutic interventions, address functional mobility deficits, address ROM deficits, address strength deficits, analyze and address soft tissue restrictions, analyze and modify body mechanics/ergonomics and assess and modify postural abnormalities to attain remaining goals. []  See Plan of Care 
[]  See progress note/recertification 
[]  See Discharge Summary Progress towards goals / Updated goals: 
 1 patient will have MMT left shoulder MMG 4+,5/5 to aid with overhead reaching and moderate activities at home  
             PN MMT   Shoulder left  Flex  4-   ABD 3+   ER   4+ 
              CURRENT progressing 3/15/19 
2 patient will have PSUA 47 DX show significant improvement with projected function at home and work 
          PN 63 
         CURRENT 
 
 
PLAN 
[]  Upgrade activities as tolerated     [x]  Continue plan of care 
[]  Update interventions per flow sheet      
[]  Discharge due to:_ 
[]  Other:_   
 
 Gaby Cruz, PTA 4/11/2019  7:36 AM 
 
Future Appointments Date Time Provider Glo Chavez 4/15/2019  7:30 AM César Llamas, PTA [facility]  
4/17/2019  7:30 AM Charoltte Lawrence, PT [facility]  
4/22/2019  5:30 PM Eleni Powell, PT [facility]  
4/25/2019  5:30 PM Corin, [employer], PTA [facility]

## 2019-04-12 DIAGNOSIS — F98.8 ATTENTION DEFICIT DISORDER (ADD) WITHOUT HYPERACTIVITY: ICD-10-CM

## 2019-04-12 RX ORDER — DEXTROAMPHETAMINE SACCHARATE, AMPHETAMINE ASPARTATE, DEXTROAMPHETAMINE SULFATE AND AMPHETAMINE SULFATE 5; 5; 5; 5 MG/1; MG/1; MG/1; MG/1
20 TABLET ORAL 2 TIMES DAILY
Qty: 60 TAB | Refills: 0 | Status: SHIPPED | OUTPATIENT
Start: 2019-04-12 | End: 2019-07-02 | Stop reason: SDUPTHER

## 2019-04-14 DIAGNOSIS — E55.9 HYPOVITAMINOSIS D: ICD-10-CM

## 2019-04-15 ENCOUNTER — HOSPITAL ENCOUNTER (OUTPATIENT)
Dept: PHYSICAL THERAPY | Age: 44
Discharge: HOME | End: 2019-04-15
Payer: COMMERCIAL

## 2019-04-15 PROCEDURE — 97110 THERAPEUTIC EXERCISES: CPT

## 2019-04-15 PROCEDURE — 97140 MANUAL THERAPY 1/> REGIONS: CPT

## 2019-04-15 NOTE — PROGRESS NOTES
PT DAILY TREATMENT NOTE 10-18 Patient Name: Michael Rivera Date:4/15/2019 : 1975 [x]  Patient  Verified Payor: BLUE CROSS / Plan: [place] [place] [employer] / Product Type: PPO / In time:7:33   Out time:8:23 Total Treatment Time (min): 43 Visit #: 9 of 12 Medicare/BCBS Only Total Timed Codes (min):  33 1:1 Treatment Time:  35 Treatment Area: Pain in left shoulder [M25.512] S/P shoulder surgery [Z98.890] SUBJECTIVE Pain Level (0-10 scale): 0 Any medication changes, allergies to medications, adverse drug reactions, diagnosis change, or new procedure performed?: [x] No    [] Yes (see summary sheet for update) Subjective functional status/changes:   [] No changes reported No pain. OBJECTIVE Modality rationale: decrease edema, decrease inflammation, decrease pain and increase tissue extensibility to improve the patients ability to  Perform ADL Min Type Additional Details  
 [] Estim:  []Unatt       []IFC  []Premod []Other:  []w/ice   []w/heat Position: Location:  
 [] Estim: []Att    []TENS instruct  []NMES []Other:  []w/US   []w/ice   []w/heat Position: Location:  
 []  Traction: [] Cervical       []Lumbar 
                     [] Prone          []Supine []Intermittent   []Continuous Lbs: 
[] before manual 
[] after manual  
 []  Ultrasound: []Continuous   [] Pulsed []1MHz   []3MHz W/cm2: 
Location:  
 []  Iontophoresis with dexamethasone Location: [] Take home patch  
[] In clinic  
8 [x]  Ice     []  heat 
[]  Ice massage 
[]  Laser  
[]  Anodyne Position: Location:  
 []  Laser with stim 
[]  Other:  Position: Location:  
 []  Vasopneumatic Device Pressure:       [] lo [] med [] hi  
Temperature: [] lo [] med [] hi  
[x] Skin assessment post-treatment:  [x]intact []redness- no adverse reaction 
  []redness  adverse reaction: min []Eval                  []Re-Eval  
 
 
27 min Therapeutic Exercise:  [] See flow sheet :  
Rationale: increase ROM, increase strength and improve coordination to improve the patients ability to perform ADL  
 
 min Therapeutic Activity:  []  See flow sheet :  
Rationale:   to improve the patients ability to  
  
 min Neuromuscular Re-education:  []  See flow sheet :  
Rationale:   to improve the patients ability to  
 
8 min Manual Therapy:  [address]  [address]  With distraction Rationale: decrease pain, increase ROM, increase tissue extensibility and decrease edema  to perform ADL  
 
 min Gait Training:  ___ feet with ___ device on level surfaces with ___ level of assist  
Rationale: With 
 [x] TE 
 [] TA 
 [] neuro 
 [] other: Patient Education: [x] Review HEP [] Progressed/Changed HEP based on:  
[] positioning   [] body mechanics   [] transfers   [] heat/ice application   
[] other:   
 
Other Objective/Functional Measures:  Minimal  Tightness  All  plane Pain Level (0-10 scale) post treatment:2 
 
ASSESSMENT/Changes in Function: Experienced  Minimal  Pain  With manual.  Fair response  To each there ex. Patient will continue to benefit from skilled PT services to address functional mobility deficits, address ROM deficits, address strength deficits, analyze and address soft tissue restrictions and analyze and cue movement patterns to attain remaining goals. [x]  See Plan of Care 
[]  See progress note/recertification 
[]  See Discharge Summary Progress towards goals / Updated goals: 
 1 patient will have MMT left shoulder MMG 4+,5/5 to aid with overhead reaching and moderate activities at home  
             PN MMT   Shoulder left  Flex  4-   ABD 3+   ER   4+ 
              CURRENT progressing 3/15/19 
2 patient will have [address]LV[address] 87 [place] show significant improvement with projected function at home and work 
          PN 63 
         CURRENT 
  
 
 
 
PLAN 
 []  Upgrade activities as tolerated     [x]  Continue plan of care 
[]  Update interventions per flow sheet      
[]  Discharge due to:_ 
[x]  Other:_REASSESS  GOALS  MELL Coombs PTA 4/15/2019  7:49 AM 
 
Future Appointments Date Time Provider Glo Chavez 4/25/2019  5:30 PM Chloe Coombs PTA [facility]

## 2019-04-17 ENCOUNTER — APPOINTMENT (OUTPATIENT)
Dept: PHYSICAL THERAPY | Age: 44
End: 2019-04-17
Payer: COMMERCIAL

## 2019-04-17 RX ORDER — ERGOCALCIFEROL 1.25 MG/1
CAPSULE ORAL
Qty: 12 CAP | Refills: 0 | Status: SHIPPED | OUTPATIENT
Start: 2019-04-17 | End: 2019-06-20 | Stop reason: SDUPTHER

## 2019-04-22 ENCOUNTER — APPOINTMENT (OUTPATIENT)
Dept: PHYSICAL THERAPY | Age: 44
End: 2019-04-22
Payer: COMMERCIAL

## 2019-04-25 ENCOUNTER — HOSPITAL ENCOUNTER (OUTPATIENT)
Dept: PHYSICAL THERAPY | Age: 44
Discharge: HOME | End: 2019-04-25
Payer: COMMERCIAL

## 2019-04-25 PROCEDURE — 97110 THERAPEUTIC EXERCISES: CPT

## 2019-04-25 NOTE — PROGRESS NOTES
PT DAILY TREATMENT NOTE 10-18 Patient Name: Harvinder Farfan Date:2019 : 1975 [x]  Patient  Verified Payor: BLUE CROSS / Plan: [place] [place] [employer] / Product Type: PPO / In time: 5:31 Out time:6:19 Total Treatment Time (min): 38 Visit #: 4 of 12 Medicare/BCBS Only Total Timed Codes (min):  38 1:1 Treatment Time:  45 Treatment Area: Pain in left shoulder [M25.512] S/P shoulder surgery [Z98.890] SUBJECTIVE Pain Level (0-10 scale): 0 Any medication changes, allergies to medications, adverse drug reactions, diagnosis change, or new procedure performed?: [x] No    [] Yes (see summary sheet for update) Subjective functional status/changes:   [] No changes reported No pain. OBJECTIVE Modality rationale: decrease edema, decrease inflammation, decrease pain and increase tissue extensibility to improve the patients ability to perform ADL Min Type Additional Details  
 [] Estim:  []Unatt       []IFC  []Premod []Other:  []w/ice   []w/heat Position: Location:  
 [] Estim: []Att    []TENS instruct  []NMES []Other:  []w/US   []w/ice   []w/heat Position: Location:  
 []  Traction: [] Cervical       []Lumbar 
                     [] Prone          []Supine []Intermittent   []Continuous Lbs: 
[] before manual 
[] after manual  
 []  Ultrasound: []Continuous   [] Pulsed []1MHz   []3MHz W/cm2: 
Location:  
 []  Iontophoresis with dexamethasone Location: [] Take home patch  
[] In clinic  
 []  Ice     []  heat 
[]  Ice massage 
[]  Laser  
[]  Anodyne Position: Location:  
 []  Laser with stim 
[]  Other:  Position: Location:  
 []  Vasopneumatic Device Pressure:       [] lo [] med [] hi  
Temperature: [] lo [] med [] hi  
[x] Skin assessment post-treatment:  [x]intact []redness- no adverse reaction 
  []redness  adverse reaction:  
 
 min []Eval                  []Re-Eval  
 38 min Therapeutic Exercise:  [x] See flow sheet :  
Rationale: increase ROM and increase strength to improve the patients ability to perform ADL  
 
 min Therapeutic Activity:  []  See flow sheet :  
Rationale:   to improve the patients ability to  
  
 min Neuromuscular Re-education:  []  See flow sheet :  
Rationale:   to improve the patients ability to  
 
 min Manual Therapy:    
Rationale: decrease pain, increase ROM, increase tissue extensibility and decrease edema  to perform ADL  
 
 min Gait Training:  ___ feet with ___ device on level surfaces with ___ level of assist  
Rationale: With 
 [x] TE 
 [] TA 
 [] neuro 
 [] other: Patient Education: [x] Review HEP [] Progressed/Changed HEP based on:  
[] positioning   [] body mechanics   [] transfers   [] heat/ice application   
[] other: REASSESS GOALS/DC PROCEDURE Other Objective/Functional Measures: FOTO:   60     Left  Shoulder  Flex   4    ABD 4   ER  3+/4- Pain Level (0-10 scale) post treatment: 0 
 
ASSESSMENT/Changes in Function: Mrs. Kyler Bush  Reports  75%  Improvement. Pain level  On average  Is  3/10  With  Prolonged  Movement. Patient will continue to benefit from skilled PT services to address functional mobility deficits, address ROM deficits, address strength deficits, analyze and address soft tissue restrictions and analyze and cue movement patterns to attain remaining goals. [x]  See Plan of Care 
[]  See progress note/recertification 
[]  See Discharge Summary Progress towards goals / Updated goals: 
 1 patient will have MMT left shoulder MMG 4+,5/5 to aid with overhead reaching and moderate activities at home  
             PN MMT   Shoulder left  Flex  4-   ABD 3+   ER   4+ 
              CURRENT Left  Shoulder  Flex   4    ABD 4   ER  3+/4- 
 
2 patient will have FOTO 68 to show significant improvement with projected function at home and work 
          PN 63 
         CURRENT 60 PLAN 
 []  Upgrade activities as tolerated     [x]  Continue plan of care 
[]  Update interventions per flow sheet      
[]  Discharge due to:_ 
[]  Other:_  FAX DC NOTE Chloe Coombs, PTA 4/25/2019  5:39 PM 
 
No future appointments.

## 2019-05-16 NOTE — PROGRESS NOTES
[facility] [address], [address] [place], [address] 
[phone] fax Discharge Summary Patient name: Becca Staley     Start of Care: 19 Referral source: Hiro Mckay MD    : 1975 Medical/Treatment Diagnosis: Pain in left shoulder [M25.512] S/P shoulder surgery [Z98.890] Payor: Maral Petersen / Plan: [facility] [place] / Product Type: PPO /        Onset Date:2018, RCR 10/2018, ROCHELLE 19 Prior Hospitalization: see medical history   Provider#: [id number] Comorbidities:  DM, arthritis, Left knee scope and left shoulder RCR Prior Level of Function: Prior to ROCHELLE- was not having pain but was having LOM since left RCR Oct 2018. Household chores, community activities, working all were tolerate Medications: Verified on Patient Summary List 
 
Visits from Start of Care: 18    Missed Visits: 2 Reporting Period : 19 to 19 Summary of Care:Patient seen for 18 skilled sessions. Residual pain was 0 and final FOTO 60. MMT Left  Shoulder  Flex   4    ABD   4   ER  3+/4-. She has exercises for her HEP and should follow up with her MD tomorrow. Thank you. Goal:patient will have established and be I with HEP to aid with progression of skilled PT program 
Status at last note/certification:eval 
Status at discharge: met Goal: patient will have pain 5/10 to aid with increase tolerance to ADLS and activities at home and work Status at last note/certification:eval 
Status at discharge: met Goal:patient will have AROM Left shoulder F 125, , ER 50 to aid with increase tolerance to overhead reaching at home and work Status at last note/certification:eval 
Status at discharge: met Goal: patient will have FOTO 64 to show significant improvement with projected function at home and work Status at last note/certification:eval 
 Status at discharge: not met, improved to 60 at last session ASSESSMENT/RECOMMENDATIONS: 
[x]Discontinue therapy progressing towards or have reached established goals []Discontinue therapy due to lack of appreciable progress towards goals []Discontinue therapy due to lack of attendance or compliance [x]Other:ready to attempt self management Thank you for this referral.  
 
Jose Guidry, PT 5/16/2019 9:24 AM

## 2019-05-20 RX ORDER — TELMISARTAN 20 MG/1
40 TABLET ORAL DAILY
Qty: 60 TAB | Refills: 1 | Status: SHIPPED | OUTPATIENT
Start: 2019-05-20 | End: 2019-07-02

## 2019-05-20 NOTE — TELEPHONE ENCOUNTER
Telmisartan 40 mg tablet currently on back order. Will need to be change to 20mg tablet.     Telmisartan 20 mg tablet  Take 2 tablets by mouth daily   Dispense 60  Refills 1  Medication per verbal order Danielito CHURCH

## 2019-06-05 DIAGNOSIS — E11.9 TYPE 2 DIABETES MELLITUS WITHOUT COMPLICATION, UNSPECIFIED WHETHER LONG TERM INSULIN USE (HCC): Primary | ICD-10-CM

## 2019-06-19 DIAGNOSIS — E55.9 HYPOVITAMINOSIS D: ICD-10-CM

## 2019-06-20 RX ORDER — ERGOCALCIFEROL 1.25 MG/1
CAPSULE ORAL
Qty: 12 CAP | Refills: 0 | Status: SHIPPED | OUTPATIENT
Start: 2019-06-20 | End: 2021-08-26 | Stop reason: SDUPTHER

## 2019-06-20 RX ORDER — ERGOCALCIFEROL 1.25 MG/1
CAPSULE ORAL
Qty: 12 CAP | Refills: 0 | OUTPATIENT
Start: 2019-06-20

## 2019-07-02 ENCOUNTER — OFFICE VISIT (OUTPATIENT)
Dept: FAMILY MEDICINE CLINIC | Age: 44
End: 2019-07-02

## 2019-07-02 ENCOUNTER — HOSPITAL ENCOUNTER (OUTPATIENT)
Dept: MAMMOGRAPHY | Age: 44
Discharge: HOME | End: 2019-07-02
Attending: NURSE PRACTITIONER
Payer: COMMERCIAL

## 2019-07-02 VITALS
RESPIRATION RATE: 16 BRPM | DIASTOLIC BLOOD PRESSURE: 82 MMHG | HEIGHT: 66 IN | SYSTOLIC BLOOD PRESSURE: 124 MMHG | HEART RATE: 90 BPM | TEMPERATURE: 98.7 F | OXYGEN SATURATION: 99 % | WEIGHT: 225.6 LBS | BODY MASS INDEX: 36.26 KG/M2

## 2019-07-02 DIAGNOSIS — E66.9 OBESITY, CLASS II, BMI 35-39.9: ICD-10-CM

## 2019-07-02 DIAGNOSIS — E78.00 PURE HYPERCHOLESTEROLEMIA: ICD-10-CM

## 2019-07-02 DIAGNOSIS — E55.9 HYPOVITAMINOSIS D: ICD-10-CM

## 2019-07-02 DIAGNOSIS — F98.8 ATTENTION DEFICIT DISORDER (ADD) WITHOUT HYPERACTIVITY: ICD-10-CM

## 2019-07-02 DIAGNOSIS — Q61.3 POLYCYSTIC KIDNEY DISEASE: ICD-10-CM

## 2019-07-02 DIAGNOSIS — Z79.4 TYPE 2 DIABETES MELLITUS WITH HYPERGLYCEMIA, WITH LONG-TERM CURRENT USE OF INSULIN (HCC): Primary | ICD-10-CM

## 2019-07-02 DIAGNOSIS — Z12.31 VISIT FOR SCREENING MAMMOGRAM: ICD-10-CM

## 2019-07-02 DIAGNOSIS — E11.65 TYPE 2 DIABETES MELLITUS WITH HYPERGLYCEMIA, WITH LONG-TERM CURRENT USE OF INSULIN (HCC): Primary | ICD-10-CM

## 2019-07-02 LAB — HBA1C MFR BLD HPLC: 7.6 %

## 2019-07-02 PROCEDURE — 77067 SCR MAMMO BI INCL CAD: CPT

## 2019-07-02 RX ORDER — INSULIN DEGLUDEC 200 U/ML
20 INJECTION, SOLUTION SUBCUTANEOUS DAILY
Qty: 9 ML | Refills: 3 | Status: SHIPPED | OUTPATIENT
Start: 2019-07-02 | End: 2020-06-17

## 2019-07-02 RX ORDER — DEXTROAMPHETAMINE SACCHARATE, AMPHETAMINE ASPARTATE, DEXTROAMPHETAMINE SULFATE AND AMPHETAMINE SULFATE 5; 5; 5; 5 MG/1; MG/1; MG/1; MG/1
20 TABLET ORAL 2 TIMES DAILY
Qty: 60 TAB | Refills: 0 | Status: SHIPPED | OUTPATIENT
Start: 2019-07-02 | End: 2019-08-16 | Stop reason: SDUPTHER

## 2019-07-02 NOTE — PROGRESS NOTES
Subjective:    Chapito Coombs is a 38y.o. year old female seen for follow up of diabetes. She also has hyperlipidemia, obesity and polycystic kidney disease. Diabetic Review of Systems - medication compliance: compliant all of the time, diabetic diet compliance: compliant most of the time, home glucose monitoring: is performed regularly, minimum reading is <50 x1, maximum reading is 164, and average reading is 149., further diabetic ROS: no polyuria or polydipsia, no chest pain, dyspnea or TIA's, no numbness, tingling or pain in extremities, last eye exam approximately 3 months ago. Other symptoms and concerns: none. Patient Active Problem List   Diagnosis Code    Diabetes mellitus, type II ([facility] [address] E11.9    Hyperlipidemia E78.5    ADHD (attention deficit hyperactivity disorder) F90.9    Type 2 diabetes with nephropathy (HCC) E11.21     Current Outpatient Medications   Medication Sig Dispense Refill    ergocalciferol (ERGOCALCIFEROL) 50,000 unit capsule TAKE ONE CAPSULE BY MOUTH EVERY 7 DAYS 12 Cap 0    telmisartan (MICARDIS) 40 mg tablet Take 0.5 Tabs by mouth daily. 30 Tab 0    rosuvastatin (CRESTOR) 20 mg tablet TAKE 1 TABLET BY MOUTH EVERY NIGHT 90 Tab 0    glipiZIDE (GLUCOTROL) 10 mg tablet TAKE 1 TABLET BY MOUTH TWICE DAILY 180 Tab 0    telmisartan (MICARDIS) 20 mg tablet Take 2 Tabs by mouth daily. 60 Tab 1    flash glucose sensor (FREESTYLE NOEMY 14 DAY SENSOR) kit Check glucose 3 times a day 1 Kit 5    flash glucose scanning reader (FREESTYLE NOEMY 14 DAY READER) misc To use with noemy sensor. Check glucose 3 times a day 2 Each 5    dextroamphetamine-amphetamine (ADDERALL) 20 mg tablet Take 1 Tab by mouth two (2) times a day. Max Daily Amount: 40 mg. 60 Tab 0    insulin degludec (TRESIBA FLEXTOUCH U-200) 200 unit/mL (3 mL) inpn 20 Units by SubCUTAneous route daily.  9 mL 3    Insulin Needles, Disposable, (REBEKAH PEN NEEDLE) 32 gauge x 5/32\" ndle To be used once daily with Homero Smith FlexTouch Pen. 100 Pen Needle 3    butalbital-acetaminophen-caffeine (FIORICET, ESGIC) -40 mg per tablet May take 1-2 tabs po every 4-6 hrs as needed for headache. Not to exceed 6 tabs in 24 hrs 60 Tab 2    SITagliptin (JANUVIA) 100 mg tablet TAKE 1 TABLET DAILY 90 Tab 1    glucose blood VI test strips (ONETOUCH VERIO) strip To use with verio meter. Check glucose 3 times per day 100 Strip 11    lancets misc Blood sugar check  Each 11    Blood-Glucose Meter (ONETOUCH VERIO FLEX START) monitoring kit Check glucose 3 times daily 1 Kit 1    Lancets misc To use with one touch mini delica device.   Check glucose 3 times daily 1 Package 11      Allergies   Allergen Reactions    Lisinopril Cough     Past Surgical History:   Procedure Laterality Date    HX GYN      hysterectomy    HX HYSTERECTOMY      2007    HX ORTHOPAEDIC  12/2014    knee    HX WISDOM TEETH EXTRACTION       Family History   Problem Relation Age of Onset    Diabetes Other     Heart Disease Other     Hypertension Other     Hypertension Mother     Diabetes Mother     High Cholesterol Mother     Diabetes Father     Hypertension Father     High Cholesterol Father       Lab Results   Component Value Date/Time    Cholesterol, total 244 (H) 03/19/2019 08:46 AM    HDL Cholesterol 51 03/19/2019 08:46 AM    LDL, calculated 160 (H) 03/19/2019 08:46 AM    VLDL, calculated 33 03/19/2019 08:46 AM    Triglyceride 165 (H) 03/19/2019 08:46 AM    CHOL/HDL Ratio 4.8 03/19/2019 08:46 AM     Lab Results   Component Value Date/Time    Sodium 137 03/19/2019 08:46 AM    Potassium 4.1 03/19/2019 08:46 AM    Chloride 103 03/19/2019 08:46 AM    CO2 26 03/19/2019 08:46 AM    Anion gap 8 03/19/2019 08:46 AM    Glucose 223 (H) 03/19/2019 08:46 AM    BUN 17 03/19/2019 08:46 AM    Creatinine 0.61 03/19/2019 08:46 AM    BUN/Creatinine ratio 28 (H) 03/19/2019 08:46 AM    GFR est AA >60 03/19/2019 08:46 AM    GFR est non-AA >60 03/19/2019 08:46 AM    Calcium 8.9 03/19/2019 08:46 AM    Bilirubin, total 0.2 03/19/2019 08:46 AM    AST (SGOT) 7 (L) 03/19/2019 08:46 AM    Alk.  phosphatase 79 03/19/2019 08:46 AM    Protein, total 6.9 03/19/2019 08:46 AM    Albumin 2.9 (L) 03/19/2019 08:46 AM    Globulin 4.0 03/19/2019 08:46 AM    A-G Ratio 0.7 (L) 03/19/2019 08:46 AM    ALT (SGPT) 17 03/19/2019 08:46 AM     Lab Results   Component Value Date/Time    WBC 8.2 01/24/2019 11:22 AM    HGB 12.0 01/24/2019 11:22 AM    HCT 37.1 01/24/2019 11:22 AM    PLATELET 176 10/53/4922 11:22 AM    MCV 78.9 01/24/2019 11:22 AM     Lab Results   Component Value Date/Time    Hemoglobin A1c 12.6 (H) 04/15/2017 09:15 AM    Hemoglobin A1c (POC) 9.5 02/28/2019 07:39 AM     Lab Results   Component Value Date/Time    Microalbumin/Creat ratio (mg/g creat) 4,575 (H) 03/19/2019 08:46 AM    Microalbumin,urine random 485.00 (H) 03/19/2019 08:46 AM    Microalbumin urine (POC) 150 12/06/2013 12:30 PM     Wt Readings from Last 3 Encounters:   02/28/19 231 lb (104.8 kg)   01/24/19 230 lb (104.3 kg)   12/11/18 225 lb (102.1 kg)     Last Point of Care HGB A1C  Hemoglobin A1c (POC)   Date Value Ref Range Status   02/28/2019 9.5 % Final      BP Readings from Last 3 Encounters:   02/28/19 136/90   01/24/19 (!) 161/98   12/11/18 138/85       Results for orders placed or performed during the hospital encounter of 03/19/19   MICROALBUMIN, UR, RAND W/ MICROALB/CREAT RATIO   Result Value Ref Range    Microalbumin,urine random 485.00 (H) 0 - 3.0 MG/DL    Creatinine, urine 106.00 30 - 125 mg/dL    Microalbumin/Creat ratio (mg/g creat) 4,575 (H) 0 - 30 mg/g   LIPID PANEL   Result Value Ref Range    LIPID PROFILE          Cholesterol, total 244 (H) <200 MG/DL    Triglyceride 165 (H) <150 MG/DL    HDL Cholesterol 51 40 - 60 MG/DL    LDL, calculated 160 (H) 0 - 100 MG/DL    VLDL, calculated 33 MG/DL    CHOL/HDL Ratio 4.8 0 - 5.0     METABOLIC PANEL, COMPREHENSIVE   Result Value Ref Range    Sodium 137 136 - 145 mmol/L Potassium 4.1 3.5 - 5.5 mmol/L    Chloride 103 100 - 108 mmol/L    CO2 26 21 - 32 mmol/L    Anion gap 8 3.0 - 18 mmol/L    Glucose 223 (H) 74 - 99 mg/dL    BUN 17 7.0 - 18 MG/DL    Creatinine 0.61 0.6 - 1.3 MG/DL    BUN/Creatinine ratio 28 (H) 12 - 20      GFR est AA >60 >60 ml/min/1.73m2    GFR est non-AA >60 >60 ml/min/1.73m2    Calcium 8.9 8.5 - 10.1 MG/DL    Bilirubin, total 0.2 0.2 - 1.0 MG/DL    ALT (SGPT) 17 13 - 56 U/L    AST (SGOT) 7 (L) 15 - 37 U/L    Alk. phosphatase 79 45 - 117 U/L    Protein, total 6.9 6.4 - 8.2 g/dL    Albumin 2.9 (L) 3.4 - 5.0 g/dL    Globulin 4.0 2.0 - 4.0 g/dL    A-G Ratio 0.7 (L) 0.8 - 1.7     TSH 3RD GENERATION   Result Value Ref Range    TSH 2.80 0.36 - 3.74 uIU/mL   T4, FREE   Result Value Ref Range    T4, Free 1.1 0.7 - 1.5 NG/DL   RHEUMASSURE   Result Value Ref Range    14. 3.3 ETA, Rheum. Arthritis <0.20 ng/mL    CCP Antibodies IgG/IgA <20 Units    Rheumatoid Arthritis Factor <14.0 Units/mL   SED RATE (ESR)   Result Value Ref Range    Sed rate, automated 28 (H) 0 - 20 mm/hr   BETZY COMPREHENSIVE PANEL   Result Value Ref Range    Anti-DNA (DS) Ab, QT 6 0 - 9 IU/mL    RNP Abs 0.2 0.0 - 0.9 AI    Dc Abs <0.2 0.0 - 0.9 AI    Scleroderma-70 Ab <0.2 0.0 - 0.9 AI    Sjogren's Anti-SS-A <0.2 0.0 - 0.9 AI    Sjogren's Anti-SS-B <0.2 0.0 - 0.9 AI    Antichromatin Ab <0.2 0.0 - 0.9 AI    Anti-Hattie-1 <0.2 0.0 - 0.9 AI    Centromere B Ab <0.2 0.0 - 0.9 AI    See below Comment           Last Diabetic Foot Exam on: 5/3/18        Objective:  Visit Vitals  /82 (BP 1 Location: Left arm)   Pulse 90   Temp 98.7 °F (37.1 °C) (Oral)   Resp 16   Ht 5' 6\" (1.676 m)   Wt 225 lb 9.6 oz (102.3 kg)   LMP 01/01/2007   SpO2 99%   BMI 36.41 kg/m²     Awake and alert in no acute distress   Neck supple without lymphadenopathy, no carotid artery bruits auscultated bilaterally. No thyromegaly   Lungs clear throughout   S1 S2 RRR without ectopy or murmur auscultated.    Extremities: no clubbing, cyanosis, peripheral edema   Abdomen - soft, nontender, nondistended, no masses or organomegaly  Integumentary: no rashes   Reviewed vital signs       Diabetic foot exam:     Left Foot:   Visual Exam: normal    Pulse DP: 2+ (normal)   Filament test: normal sensation    Vibratory sensation: normal      Right Foot:   Visual Exam: normal    Pulse DP: 2+ (normal)   Filament test: normal sensation    Vibratory sensation: normal         Assessment/Plan:    ICD-10-CM ICD-9-CM    1. Type 2 diabetes mellitus with hyperglycemia, with long-term current use of insulin (HCC) E11.65 250.00 LIPID PANEL    C88.9 336.86 METABOLIC PANEL, COMPREHENSIVE     V58.67 MICROALBUMIN, UR, RAND W/ MICROALB/CREAT RATIO      AMB POC HEMOGLOBIN A1C      HEMOGLOBIN A1C WITH EAG   2. Polycystic kidney disease B52.4 586.40 METABOLIC PANEL, COMPREHENSIVE      MICROALBUMIN, UR, RAND W/ MICROALB/CREAT RATIO      AMB POC HEMOGLOBIN A1C      RENAL FUNCTION PANEL      CBC W/O DIFF   3. Pure hypercholesterolemia E78.00 272.0 LIPID PANEL      METABOLIC PANEL, COMPREHENSIVE   4. Obesity, Class II, BMI 35-39.9 E66.9 278.00    5. Attention deficit disorder (ADD) without hyperactivity F98.8 314.00 dextroamphetamine-amphetamine (ADDERALL) 20 mg tablet   6. Hypovitaminosis D E55.9 268.9 VITAMIN D, 25 HYDROXY     Orders Placed This Encounter    LIPID PANEL    METABOLIC PANEL, COMPREHENSIVE    VITAMIN D, 25 HYDROXY    MICROALBUMIN, UR, RAND W/ MICROALB/CREAT RATIO    RENAL FUNCTION PANEL    CBC W/O DIFF    AMB POC HEMOGLOBIN A1C    insulin degludec (TRESIBA FLEXTOUCH U-200) 200 unit/mL (3 mL) inpn    dextroamphetamine-amphetamine (ADDERALL) 20 mg tablet     Above stable  Issues reviewed with her: foot care discussed and Podiatry visits discussed. I have discussed the diagnosis with the patient and the intended plan as seen in the above orders. The patient has received an after-visit summary and questions were answered concerning future plans.   I have discussed medication side effects and warnings with the patient as well. Patient agreeable with above plan and verbalizes understanding. Follow-up and Dispositions    · Return in about 3 months (around 10/2/2019) for DM/HLD.

## 2019-07-02 NOTE — PATIENT INSTRUCTIONS
Learning About Low Blood Sugar (Hypoglycemia) in Diabetes What is low blood sugar (hypoglycemia)? Hypoglycemia means that your blood sugar is low and your body (especially your brain) is not getting enough fuel. If you have diabetes, your blood sugar can go too low if you take too much of some diabetes medicines. It can also go too low if you miss a meal. And it can happen if you exercise too hard without eating enough food. Some medicines used to treat other health problems can cause low blood sugar too. What are the symptoms? Symptoms of low blood sugar can start quickly. It may take just 10 to 15 minutes. If you have had diabetes for many years, you may not realize that your blood sugar is low until it drops very low. · If your blood sugar level drops below 70 (mild low blood sugar), you may feel tired, anxious, dizzy, weak, shaky, or sweaty. You may have a fast heartbeat or blurry vision. · If your blood sugar level continues to drop (usually below 40), your behavior may change. You may feel more irritable. You may find it hard to concentrate or talk. And you may feel unsteady when you stand or walk. You may become too weak or confused to eat something with sugar to raise your blood sugar level. · If your blood sugar level drops very low (usually below 20), you may pass out (lose consciousness). Or you may have a seizure or stroke. If you have symptoms of severe low blood sugar, you need to get medical care right away. If you had a low blood sugar level during the night, you may wake up tired or with a headache. Or you may sweat so much during the night that your pajamas or sheets are damp when you wake up. How is low blood sugar treated? You can treat low blood sugar by eating or drinking something that has 15 grams of carbohydrate. These should be quick-sugar foods.  Check your blood sugar level again 15 minutes after having a quick-sugar food to make sure your level is getting back to your target range. Here are examples of quick-sugar foods that have 15 grams of carbohydrate: · 3 to 4 glucose tablets · 1 tube of glucose gel · Hard candy (such as 3 Jolly Ranchers or 5 to [employer]) · 1 tablespoon honey · 2 tablespoons of raisins · ½ cup to ¾ cup (4 to 6 ounces) of fruit juice or regular (not diet) soda · 1 tablespoon of sugar · 1 cup of fat-free milk If you have problems with severe low blood sugar, someone else may have to give you a shot of glucagon. This is a hormone that raises blood sugar levels quickly. How can you prevent low blood sugar? You can take steps to prevent low blood sugar. · Follow your treatment plan. Take your insulin or other diabetes medicine exactly as your doctor prescribed it. Talk with your doctor if you're having low blood sugar often. Your medicine may need to be adjusted if it's causing your low blood sugar. · Check your blood sugar levels often. This helps you find early changes before an emergency happens. · Keep a quick-sugar food with you in case your blood sugar level drops low. · Eat small meals more often so that you don't get too hungry between meals. Don't skip meals. · Balance extra exercise with eating more. Check your blood sugar and learn how it changes after exercise. If your blood sugar stays at a normal level, you may not need to eat after you exercise. · Limit how much alcohol you drink. Alcohol can make low blood sugar go even lower. Don't drink alcohol if you have problems recognizing the early signs of low blood sugar. · Keep a diary of your symptoms. This helps you learn when changes in your body may signal low blood sugar. And keep track of how often you have low blood sugar, including when you last ate and what you ate. This will help you learn what causes your blood sugar to drop. · Learn about diabetes and low blood sugar.  Support groups or a diabetes education center can help you understand how medicines, diet, and exercise affect your blood sugar levels. Since low blood sugar levels can quickly become an emergency, be sure to wear medical alert jewelry, such as a medical alert bracelet. This is to let people know you have diabetes so they can get help for you. You can buy this at most drugstores. And make sure your family, friends, and coworkers know the symptoms of low blood sugar. Teach them what to do to get your sugar level up. Follow-up care is a key part of your treatment and safety. Be sure to make and go to all appointments, and call your doctor if you are having problems. It's also a good idea to know your test results and keep a list of the medicines you take. Where can you learn more? Go to http://jakob-niko.info/. Enter V871 in the search box to learn more about \"Learning About Low Blood Sugar (Hypoglycemia) in Diabetes. \" Current as of: July 25, 2018 Content Version: 11.9 © 1724-8228 [employer]. Care instructions adapted under license by [employer] (which disclaims liability or warranty for this information). If you have questions about a medical condition or this instruction, always ask your healthcare professional. [address] any warranty or liability for your use of this information.

## 2019-07-02 NOTE — PROGRESS NOTES
Pt is here for follow up diabetes, cholesterol    1. Have you been to the ER, urgent care clinic since your last visit? Hospitalized since your last visit? No    2. Have you seen or consulted any other health care providers outside of the [address] [place] [address] since your last visit? Include any pap smears or colon screening. Dr Helen torres?

## 2019-07-05 ENCOUNTER — HOSPITAL ENCOUNTER (OUTPATIENT)
Dept: LAB | Age: 44
Discharge: HOME | End: 2019-07-05
Payer: COMMERCIAL

## 2019-07-05 DIAGNOSIS — E55.9 HYPOVITAMINOSIS D: ICD-10-CM

## 2019-07-05 DIAGNOSIS — E11.65 TYPE 2 DIABETES MELLITUS WITH HYPERGLYCEMIA, WITH LONG-TERM CURRENT USE OF INSULIN (HCC): ICD-10-CM

## 2019-07-05 DIAGNOSIS — E78.00 PURE HYPERCHOLESTEROLEMIA: ICD-10-CM

## 2019-07-05 DIAGNOSIS — Z79.4 TYPE 2 DIABETES MELLITUS WITH HYPERGLYCEMIA, WITH LONG-TERM CURRENT USE OF INSULIN (HCC): ICD-10-CM

## 2019-07-05 DIAGNOSIS — Q61.3 POLYCYSTIC KIDNEY DISEASE: ICD-10-CM

## 2019-07-05 LAB
ALBUMIN SERPL-MCNC: 2.7 G/DL (ref 3.4–5)
ALBUMIN/GLOB SERPL: 0.8 {RATIO} (ref 0.8–1.7)
ALP SERPL-CCNC: 71 U/L (ref 45–117)
ALT SERPL-CCNC: 12 U/L (ref 13–56)
ANION GAP SERPL CALC-SCNC: 6 MMOL/L (ref 3–18)
AST SERPL-CCNC: 11 U/L (ref 15–37)
BILIRUB SERPL-MCNC: 0.2 MG/DL (ref 0.2–1)
BUN SERPL-MCNC: 11 MG/DL (ref 7–18)
BUN/CREAT SERPL: 20 (ref 12–20)
CALCIUM SERPL-MCNC: 8.5 MG/DL (ref 8.5–10.1)
CHLORIDE SERPL-SCNC: 106 MMOL/L (ref 100–108)
CHOLEST SERPL-MCNC: 165 MG/DL
CO2 SERPL-SCNC: 28 MMOL/L (ref 21–32)
CREAT SERPL-MCNC: 0.55 MG/DL (ref 0.6–1.3)
ERYTHROCYTE [DISTWIDTH] IN BLOOD BY AUTOMATED COUNT: 14.3 % (ref 11.6–14.5)
EST. AVERAGE GLUCOSE BLD GHB EST-MCNC: 177 MG/DL
GLOBULIN SER CALC-MCNC: 3.6 G/DL (ref 2–4)
GLUCOSE SERPL-MCNC: 134 MG/DL (ref 74–99)
HBA1C MFR BLD: 7.8 % (ref 4.2–5.6)
HCT VFR BLD AUTO: 35.8 % (ref 35–45)
HDLC SERPL-MCNC: 50 MG/DL (ref 40–60)
HDLC SERPL: 3.3 {RATIO} (ref 0–5)
HGB BLD-MCNC: 11 G/DL (ref 12–16)
LDLC SERPL CALC-MCNC: 93.8 MG/DL (ref 0–100)
LIPID PROFILE,FLP: NORMAL
MCH RBC QN AUTO: 24.9 PG (ref 24–34)
MCHC RBC AUTO-ENTMCNC: 30.7 G/DL (ref 31–37)
MCV RBC AUTO: 81 FL (ref 74–97)
PLATELET # BLD AUTO: 315 K/UL (ref 135–420)
PMV BLD AUTO: 9.2 FL (ref 9.2–11.8)
POTASSIUM SERPL-SCNC: 4.2 MMOL/L (ref 3.5–5.5)
PROT SERPL-MCNC: 6.3 G/DL (ref 6.4–8.2)
RBC # BLD AUTO: 4.42 M/UL (ref 4.2–5.3)
SODIUM SERPL-SCNC: 140 MMOL/L (ref 136–145)
TRIGL SERPL-MCNC: 106 MG/DL (ref ?–150)
VLDLC SERPL CALC-MCNC: 21.2 MG/DL
WBC # BLD AUTO: 7.7 K/UL (ref 4.6–13.2)

## 2019-07-05 PROCEDURE — 85027 COMPLETE CBC AUTOMATED: CPT

## 2019-07-05 PROCEDURE — 80053 COMPREHEN METABOLIC PANEL: CPT

## 2019-07-05 PROCEDURE — 83036 HEMOGLOBIN GLYCOSYLATED A1C: CPT

## 2019-07-05 PROCEDURE — 84100 ASSAY OF PHOSPHORUS: CPT

## 2019-07-05 PROCEDURE — 82248 BILIRUBIN DIRECT: CPT

## 2019-07-05 PROCEDURE — 36415 COLL VENOUS BLD VENIPUNCTURE: CPT

## 2019-07-05 PROCEDURE — 83735 ASSAY OF MAGNESIUM: CPT

## 2019-07-05 PROCEDURE — 82043 UR ALBUMIN QUANTITATIVE: CPT

## 2019-07-05 PROCEDURE — 80061 LIPID PANEL: CPT

## 2019-07-05 PROCEDURE — 82306 VITAMIN D 25 HYDROXY: CPT

## 2019-07-06 LAB
25(OH)D3 SERPL-MCNC: 40.8 NG/ML (ref 30–100)
BILIRUB DIRECT SERPL-MCNC: <0.1 MG/DL (ref 0–0.2)
CREAT UR-MCNC: 88 MG/DL (ref 30–125)
MICROALBUMIN UR-MCNC: 418 MG/DL (ref 0–3)
MICROALBUMIN/CREAT UR-RTO: 4750 MG/G (ref 0–30)

## 2019-07-07 DIAGNOSIS — G43.909 MIGRAINE WITHOUT STATUS MIGRAINOSUS, NOT INTRACTABLE, UNSPECIFIED MIGRAINE TYPE: ICD-10-CM

## 2019-07-07 RX ORDER — BUTALBITAL, ACETAMINOPHEN AND CAFFEINE 50; 325; 40 MG/1; MG/1; MG/1
TABLET ORAL
Qty: 60 TAB | Refills: 2 | Status: SHIPPED | OUTPATIENT
Start: 2019-07-07 | End: 2019-09-24 | Stop reason: SDUPTHER

## 2019-07-08 DIAGNOSIS — Q61.3 POLYCYSTIC KIDNEY DISEASE: Primary | ICD-10-CM

## 2019-07-09 LAB
MAGNESIUM SERPL-MCNC: 2 MG/DL (ref 1.6–2.6)
PHOSPHATE SERPL-MCNC: 4.3 MG/DL (ref 2.5–4.9)

## 2019-08-16 DIAGNOSIS — F98.8 ATTENTION DEFICIT DISORDER (ADD) WITHOUT HYPERACTIVITY: ICD-10-CM

## 2019-08-16 RX ORDER — DEXTROAMPHETAMINE SACCHARATE, AMPHETAMINE ASPARTATE, DEXTROAMPHETAMINE SULFATE AND AMPHETAMINE SULFATE 5; 5; 5; 5 MG/1; MG/1; MG/1; MG/1
20 TABLET ORAL 2 TIMES DAILY
Qty: 60 TAB | Refills: 0 | Status: SHIPPED | OUTPATIENT
Start: 2019-08-16 | End: 2019-09-24 | Stop reason: SDUPTHER

## 2019-08-17 RX ORDER — FLUCONAZOLE 150 MG/1
150 TABLET ORAL
Qty: 3 TAB | Refills: 0 | Status: SHIPPED | OUTPATIENT
Start: 2019-08-17 | End: 2019-08-24

## 2019-08-17 RX ORDER — AMOXICILLIN 875 MG/1
875 TABLET, FILM COATED ORAL 2 TIMES DAILY
Qty: 20 TAB | Refills: 0 | Status: SHIPPED | OUTPATIENT
Start: 2019-08-17 | End: 2019-08-27

## 2019-08-20 DIAGNOSIS — R05.9 COUGH: Primary | ICD-10-CM

## 2019-08-20 RX ORDER — HYDROCODONE POLISTIREX AND CHLORPHENIRAMINE POLISTIREX 10; 8 MG/5ML; MG/5ML
5 SUSPENSION, EXTENDED RELEASE ORAL
Qty: 120 ML | Refills: 0 | Status: SHIPPED | OUTPATIENT
Start: 2019-08-20 | End: 2019-09-03

## 2019-08-20 RX ORDER — DOXYCYCLINE 100 MG/1
100 TABLET ORAL 2 TIMES DAILY
Qty: 20 TAB | Refills: 0 | Status: SHIPPED | OUTPATIENT
Start: 2019-08-20 | End: 2019-08-30

## 2019-08-23 RX ORDER — PREDNISONE 10 MG/1
TABLET ORAL
Qty: 21 TAB | Refills: 0 | Status: SHIPPED | OUTPATIENT
Start: 2019-08-23 | End: 2019-11-18

## 2019-08-23 RX ORDER — ALBUTEROL SULFATE 90 UG/1
2 AEROSOL, METERED RESPIRATORY (INHALATION)
Qty: 1 INHALER | Refills: 0 | Status: SHIPPED | OUTPATIENT
Start: 2019-08-23 | End: 2021-01-20 | Stop reason: SDUPTHER

## 2019-09-24 ENCOUNTER — OFFICE VISIT (OUTPATIENT)
Dept: FAMILY MEDICINE CLINIC | Age: 44
End: 2019-09-24

## 2019-09-24 VITALS
SYSTOLIC BLOOD PRESSURE: 160 MMHG | RESPIRATION RATE: 16 BRPM | HEIGHT: 66 IN | DIASTOLIC BLOOD PRESSURE: 92 MMHG | WEIGHT: 230 LBS | BODY MASS INDEX: 36.96 KG/M2 | TEMPERATURE: 98.4 F | HEART RATE: 98 BPM | OXYGEN SATURATION: 98 %

## 2019-09-24 DIAGNOSIS — Q61.3 POLYCYSTIC KIDNEY DISEASE: ICD-10-CM

## 2019-09-24 DIAGNOSIS — Z82.49 FAMILY HISTORY OF BRAIN ANEURYSM: ICD-10-CM

## 2019-09-24 DIAGNOSIS — G43.909 MIGRAINE WITHOUT STATUS MIGRAINOSUS, NOT INTRACTABLE, UNSPECIFIED MIGRAINE TYPE: ICD-10-CM

## 2019-09-24 DIAGNOSIS — F98.8 ATTENTION DEFICIT DISORDER (ADD) WITHOUT HYPERACTIVITY: ICD-10-CM

## 2019-09-24 DIAGNOSIS — R51.9 GENERALIZED HEADACHE: Primary | ICD-10-CM

## 2019-09-24 RX ORDER — DEXTROAMPHETAMINE SACCHARATE, AMPHETAMINE ASPARTATE, DEXTROAMPHETAMINE SULFATE AND AMPHETAMINE SULFATE 5; 5; 5; 5 MG/1; MG/1; MG/1; MG/1
20 TABLET ORAL 2 TIMES DAILY
Qty: 60 TAB | Refills: 0 | Status: SHIPPED | OUTPATIENT
Start: 2019-09-24 | End: 2019-11-11 | Stop reason: SDUPTHER

## 2019-09-24 RX ORDER — BUTALBITAL, ACETAMINOPHEN AND CAFFEINE 50; 325; 40 MG/1; MG/1; MG/1
TABLET ORAL
Qty: 60 TAB | Refills: 2 | OUTPATIENT
Start: 2019-09-24 | End: 2019-11-18

## 2019-09-24 RX ORDER — TELMISARTAN 40 MG/1
40 TABLET ORAL DAILY
Qty: 30 TAB | Refills: 2 | Status: SHIPPED | OUTPATIENT
Start: 2019-09-24 | End: 2019-10-11 | Stop reason: SDUPTHER

## 2019-09-24 NOTE — PROGRESS NOTES
Hilda Kaplan presents today for No chief complaint on file. Is someone accompanying this pt? no    Is the patient using any DME equipment during [address]? no    Depression Screening:  3 most recent PHQ Screens 7/2/2019   Little interest or pleasure in doing things Not at all   Feeling down, depressed, irritable, or hopeless Not at all   Total Score PHQ 2 0       Learning Assessment:  Learning Assessment 2/28/2019   PRIMARY LEARNER Patient   HIGHEST LEVEL OF EDUCATION - PRIMARY LEARNER  [address] [place] [address] PRIMARY LEARNER NONE   CO-LEARNER CAREGIVER No   PRIMARY LANGUAGE ENGLISH   LEARNER PREFERENCE PRIMARY DEMONSTRATION     -   ANSWERED BY self   RELATIONSHIP SELF       Abuse Screening:  Abuse Screening Questionnaire 8/22/2014   Do you ever feel afraid of your partner? N   Are you in a relationship with someone who physically or mentally threatens you? N   Is it safe for you to go home? Y       Fall Risk  No flowsheet data found. Health Maintenance reviewed and discussed and ordered per Provider. Health Maintenance Due   Topic Date Due    Pneumococcal 0-64 years (1 of 1 - PPSV23) 04/07/1982    PAP AKA CERVICAL CYTOLOGY  07/29/2017    Influenza Age 9 to Adult  08/01/2019           Coordination of Care:  1. Have you been to the ER, urgent care clinic since your last visit? Hospitalized since your last visit? no    2. Have you seen or consulted any other health care providers outside of the [address] [place] [address] since your last visit? Include any pap smears or colon screening.  no

## 2019-09-24 NOTE — PROGRESS NOTES
HISTORY OF PRESENT ILLNESS  Hermilo Nath is a 38 y.o. female. Patient presents today for further evaluation and treatment of headache. States yesterday she had a severe headaches to bilateral temporal region and generalized. Comments at times she will have a tingling sensation to the top of her head and also on the sides of her head. Further states she has photophobia, phonophobia, nausea. States she took Fioricet when symptoms began without improvement. Reports she also had episdoes several months ago when she was smelling cigarettes smoke which self resolved. States she has had increased  stressors with her mother being ill. Comments she is concerned to family history of brain aneurysms. Allergies   Allergen Reactions    Lisinopril Cough     Current Outpatient Medications   Medication Sig Dispense Refill    telmisartan (MICARDIS) 40 mg tablet TAKE 1/2 TABLET BY MOUTH EVERY DAY 30 Tab 2    dextroamphetamine-amphetamine (ADDERALL) 20 mg tablet Take 1 Tab by mouth two (2) times a day. Max Daily Amount: 40 mg. 60 Tab 0    SITagliptin (JANUVIA) 100 mg tablet TAKE 1 TABLET DAILY 90 Tab 1    butalbital-acetaminophen-caffeine (FIORICET, ESGIC) -40 mg per tablet May take 1-2 tabs po every 4-6 hrs as needed for headache. Not to exceed 6 tabs in 24 hrs 60 Tab 2    insulin degludec (TRESIBA FLEXTOUCH U-200) 200 unit/mL (3 mL) inpn 20 Units by SubCUTAneous route daily. 9 mL 3    ergocalciferol (ERGOCALCIFEROL) 50,000 unit capsule TAKE ONE CAPSULE BY MOUTH EVERY 7 DAYS 12 Cap 0    rosuvastatin (CRESTOR) 20 mg tablet TAKE 1 TABLET BY MOUTH EVERY NIGHT 90 Tab 0    glipiZIDE (GLUCOTROL) 10 mg tablet TAKE 1 TABLET BY MOUTH TWICE DAILY 180 Tab 0    flash glucose sensor (FREESTYLE NOLAN 14 DAY SENSOR) kit Check glucose 3 times a day 1 Kit 5    flash glucose scanning reader (FREESTYLE NOLAN 14 DAY READER) misc To use with nolan sensor.  Check glucose 3 times a day 2 Each 5    Insulin Needles, Disposable, (REBEKAH PEN NEEDLE) 32 gauge x 5/32\" ndle To be used once daily with Diane Chavez FlexTouch Pen. 100 Pen Needle 3    glucose blood VI test strips (ONETOUCH VERIO) strip To use with verio meter. Check glucose 3 times per day 100 Strip 11    lancets misc Blood sugar check  Each 11    Blood-Glucose Meter (ONETOUCH VERIO FLEX START) monitoring kit Check glucose 3 times daily 1 Kit 1    predniSONE (STERAPRED DS) 10 mg dose pack See administration instruction per 10mg dose pack 21 Tab 0    albuterol (PROVENTIL HFA, VENTOLIN HFA, PROAIR HFA) 90 mcg/actuation inhaler Take 2 Puffs by inhalation every four (4) hours as needed for Wheezing or Shortness of Breath. 1 Inhaler 0     Past Medical History:   Diagnosis Date    ADHD (attention deficit hyperactivity disorder)     Diabetes ([facility]     Diabetes mellitus, type II ([facility]     History of echocardiogram 2015    EF 60-65%. No RWMA. Mild conc LVH. No significant valvular heart disease.  History of uterine fibroid     Holter monitor, normal 2015    Sinus rhythm, avg HR 99 bpm (range  bpm). Benign Holter study.  Hyperlipidemia     LLE venous duplex 2015    Left leg:  No DVT.      Social History     Socioeconomic History    Marital status:      Spouse name: Not on file    Number of children: Not on file    Years of education: Not on file    Highest education level: Not on file   Occupational History    Not on file   Social Needs    Financial resource strain: Not on file    Food insecurity:     Worry: Not on file     Inability: Not on file    Transportation needs:     Medical: Not on file     Non-medical: Not on file   Tobacco Use    Smoking status: Former Smoker     Last attempt to quit: 2000     Years since quittin.7    Smokeless tobacco: Never Used   Substance and Sexual Activity    Alcohol use: Yes     Comment: socially    Drug use: No    Sexual activity: Yes     Partners: Male     Birth control/protection: None     Comment:    Lifestyle    Physical activity:     Days per week: Not on file     Minutes per session: Not on file    Stress: Not on file   Relationships    Social connections:     Talks on phone: Not on file     Gets together: Not on file     Attends Taoism service: Not on file     Active member of club or organization: Not on file     Attends meetings of clubs or organizations: Not on file     Relationship status: Not on file    Intimate partner violence:     Fear of current or ex partner: Not on file     Emotionally abused: Not on file     Physically abused: Not on file     Forced sexual activity: Not on file   Other Topics Concern     Service Not Asked    Blood Transfusions Not Asked    Caffeine Concern Yes     Comment: 1 cup a day    Occupational Exposure Not Asked   Sheri Puller Hazards Not Asked    Sleep Concern Not Asked    Stress Concern Not Asked    Weight Concern Not Asked    Special Diet Not Asked    Back Care Not Asked    Exercise No    Bike Helmet Not Asked    Seat Belt Yes    Self-Exams Not Asked   Social History Narrative    ** Merged History Encounter **          Wt Readings from Last 3 Encounters:   09/24/19 230 lb (104.3 kg)   07/02/19 225 lb 9.6 oz (102.3 kg)   02/28/19 231 lb (104.8 kg)     BP Readings from Last 3 Encounters:   09/24/19 (!) 195/107   07/02/19 124/82   02/28/19 136/90     Review of Systems   Constitutional: Negative for chills and fever. Eyes: Positive for photophobia. Respiratory: Negative for shortness of breath. Cardiovascular: Negative for chest pain and palpitations. Gastrointestinal: Positive for nausea. Neurological: Positive for headaches. Negative for dizziness.         Intermittent lightheadedness      BP (!) 160/92 (BP 1 Location: Right arm, BP Patient Position: Sitting)   Pulse 98   Temp 98.4 °F (36.9 °C) (Oral)   Resp 16   Ht 5' 6\" (1.676 m)   Wt 230 lb (104.3 kg)   LMP 01/01/2007   SpO2 98% BMI 37.12 kg/m²      Physical Exam   Constitutional: She is oriented to person, place, and time. She appears well-developed and well-nourished. HENT:   Head: Normocephalic and atraumatic. Neck: Normal range of motion. Neck supple. Cardiovascular: Normal rate, regular rhythm and normal heart sounds. Exam reveals no gallop and no friction rub. No murmur heard. Pulmonary/Chest: Effort normal and breath sounds normal. She has no wheezes. She has no rhonchi. She has no rales. Musculoskeletal: She exhibits no edema. Neurological: She is alert and oriented to person, place, and time. She has normal reflexes. No cranial nerve deficit. Skin: Skin is warm and dry. Psychiatric: She has a normal mood and affect. Her behavior is normal. Judgment and thought content normal.       ASSESSMENT and PLAN    ICD-10-CM ICD-9-CM    1. Generalized headache R51 784.0 CTA HEAD   2. Family history of brain aneurysm Z82.49 V17.1 CTA HEAD   3. Polycystic kidney disease Q61.3 753.12    4. Attention deficit disorder (ADD) without hyperactivity F98.8 314.00 dextroamphetamine-amphetamine (ADDERALL) 20 mg tablet   5. Migraine without status migrainosus, not intractable, unspecified migraine type G43.909 346.90 butalbital-acetaminophen-caffeine (FIORICET, ESGIC) -40 mg per tablet     Instructed to monitor blood pressure closely, continue taking Micardis 40 mg 1 tab daily. alarm signs when to seek emergent care provided and reviewed   I have discussed the diagnosis with the patient and the intended plan as seen in the above orders. The patient has received an after-visit summary and questions were answered concerning future plans. I have discussed medication side effects and warnings with the patient as well. Patient agreeable with above plan and verbalizes understanding. Follow-up and Dispositions    · Return in about 4 weeks (around 10/22/2019) for HTN.

## 2019-09-24 NOTE — PATIENT INSTRUCTIONS

## 2019-10-01 ENCOUNTER — HOSPITAL ENCOUNTER (OUTPATIENT)
Dept: CT IMAGING | Age: 44
Discharge: HOME | End: 2019-10-01
Attending: NURSE PRACTITIONER
Payer: COMMERCIAL

## 2019-10-01 DIAGNOSIS — Z82.49 FAMILY HISTORY OF BRAIN ANEURYSM: ICD-10-CM

## 2019-10-01 DIAGNOSIS — R51.9 GENERALIZED HEADACHE: ICD-10-CM

## 2019-10-01 LAB — CREAT UR-MCNC: 0.6 MG/DL (ref 0.6–1.3)

## 2019-10-01 PROCEDURE — 82565 ASSAY OF CREATININE: CPT

## 2019-10-01 PROCEDURE — 70496 CT ANGIOGRAPHY HEAD: CPT

## 2019-10-01 PROCEDURE — 74011636320 HC RX REV CODE- 636/320: Performed by: NURSE PRACTITIONER

## 2019-10-01 RX ADMIN — IOPAMIDOL 80 ML: 755 INJECTION, SOLUTION INTRAVENOUS at 09:02

## 2019-10-11 RX ORDER — TELMISARTAN 40 MG/1
80 TABLET ORAL DAILY
Qty: 60 TAB | Refills: 5 | Status: SHIPPED | OUTPATIENT
Start: 2019-10-11 | End: 2019-11-18

## 2019-11-08 RX ORDER — TELMISARTAN 80 MG/1
80 TABLET ORAL DAILY
Qty: 90 TAB | Refills: 3 | Status: SHIPPED | OUTPATIENT
Start: 2019-11-08 | End: 2020-09-18 | Stop reason: ALTCHOICE

## 2019-11-11 DIAGNOSIS — F98.8 ATTENTION DEFICIT DISORDER (ADD) WITHOUT HYPERACTIVITY: ICD-10-CM

## 2019-11-12 RX ORDER — DEXTROAMPHETAMINE SACCHARATE, AMPHETAMINE ASPARTATE, DEXTROAMPHETAMINE SULFATE AND AMPHETAMINE SULFATE 5; 5; 5; 5 MG/1; MG/1; MG/1; MG/1
20 TABLET ORAL 2 TIMES DAILY
Qty: 60 TAB | Refills: 0 | Status: SHIPPED | OUTPATIENT
Start: 2019-11-12 | End: 2020-02-05 | Stop reason: SDUPTHER

## 2019-11-18 ENCOUNTER — HOSPITAL ENCOUNTER (EMERGENCY)
Age: 44
Discharge: HOME | End: 2019-11-18
Attending: EMERGENCY MEDICINE
Payer: COMMERCIAL

## 2019-11-18 VITALS
BODY MASS INDEX: 36.64 KG/M2 | HEIGHT: 66 IN | OXYGEN SATURATION: 99 % | RESPIRATION RATE: 20 BRPM | TEMPERATURE: 98.1 F | WEIGHT: 228 LBS | HEART RATE: 82 BPM

## 2019-11-18 DIAGNOSIS — M77.11 LATERAL EPICONDYLITIS OF RIGHT ELBOW: Primary | ICD-10-CM

## 2019-11-18 PROCEDURE — 99283 EMERGENCY DEPT VISIT LOW MDM: CPT

## 2019-11-18 RX ORDER — OXYCODONE AND ACETAMINOPHEN 5; 325 MG/1; MG/1
1 TABLET ORAL
Qty: 12 TAB | Refills: 0 | Status: SHIPPED | OUTPATIENT
Start: 2019-11-18 | End: 2019-11-23

## 2019-11-18 NOTE — LETTER
NOTIFICATION RETURN TO WORK / SCHOOL 
 
11/18/2019 9:35 AM 
 
Ms. Roly Watson [address] [address] To Whom It May Concern: 
 
Roly Watson is currently under the care of [id number] [facility] EMERGENCY DEPT. She was accompanied by her , Ally Gil. Please excuse Mr. Selvin Kahn from work today. If there are questions or concerns please have the patient contact our office.  
 
 
 
Sincerely, 
 
 
 
 
 
Jarret Brock RN

## 2019-11-18 NOTE — DISCHARGE INSTRUCTIONS
Patient Education        Tennis Elbow: Care Instructions  Your Care Instructions    Tennis elbow is soreness or pain on the outer part of the elbow. The pain occurs when the tendon is stretched and becomes irritated by repeated twisting of the hand, wrist, and forearm. A tendon is a tough tissue that connects muscle to bone. This injury is common in tennis players. But you also can get it from many activities that work the same muscles. Examples include gardening, painting, and using tools. Tennis elbow usually heals with rest and treatment at home. Follow-up care is a key part of your treatment and safety. Be sure to make and go to all appointments, and call your doctor if you are having problems. It's also a good idea to know your test results and keep a list of the medicines you take. How can you care for yourself at home?    · Rest your fingers, wrist, and forearm. Try to stop or reduce any activity that causes elbow pain. You may have to rest your arm for weeks to months. Follow your doctor's directions for how long to rest.     · Put ice or a cold pack on your elbow for 10 to 20 minutes at a time. Try to do this every 1 to 2 hours for the next 3 days (when you are awake) or until the swelling goes down. Put a thin cloth between the ice and your skin.     · If your doctor gave you a brace or splint, use it as directed. A \"counterforce\" brace is a strap around your forearm, just below your elbow. It may ease the pressure on the tendon and spread force throughout your arm.     · Prop up your elbow on pillows to help reduce swelling.     · Follow your doctor's or physical therapist's directions for exercise.     · Return to your usual activities slowly.     · Try to prevent the problem. Learn the best techniques for your sport. For example, make sure the  on your tennis racquet is not too big for your hand.  Try not to hit a tennis ball late in your swing.     · Think about asking your employer about new ways of doing your job if your elbow pain is caused by something you do at work. Medicines    · Be safe with medicines. Read and follow all instructions on the label. ? If the doctor gave you a prescription medicine for pain, take it as prescribed. ? If you are not taking a prescription pain medicine, ask your doctor if you can take an over-the-counter medicine. When should you call for help? Call your doctor now or seek immediate medical care if:    · Your pain is worse.     · You cannot bend your elbow normally.     · Your arm or hand is cool or pale or changes color.     · You have tingling, weakness, or numbness in your hand and fingers.    Watch closely for changes in your health, and be sure to contact your doctor if:    · You have work problems caused by your elbow pain.     · Your pain is not better after 2 weeks. Where can you learn more? Go to http://jakob-niko.info/. Enter [phone] in the search box to learn more about \"Tennis Elbow: Care Instructions. \"  Current as of: June 26, 2019  Content Version: 12.2  © 4713-7001 [employer]. Care instructions adapted under license by [employer] (which disclaims liability or warranty for this information). If you have questions about a medical condition or this instruction, always ask your healthcare professional. [address] any warranty or liability for your use of this information.

## 2019-11-18 NOTE — ED PROVIDER NOTES
HPI patient complains of an exacerbation of her tennis elbow symptoms. She complains of 3 days of pain in the lateral right elbow area. She says she had similar episode several years ago this feels like the same thing. She denies any recent history of trauma or injury or any other known exacerbating event. She has been taking over-the-counter Tylenol for her pain but says this is not helped. She says over the past 24 hours symptoms have gotten particularly worse. Elbow pain is worse with flexion and extension of the elbow is feels better with rest and ice. She denies any hand or shoulder pain no other complaints given at this time. Past Medical History:   Diagnosis Date    ADHD (attention deficit hyperactivity disorder)     [facility]     Diabetes mellitus, type II ([facility]     History of echocardiogram 09/02/2015    EF 60-65%. No RWMA. Mild conc LVH. No significant valvular heart disease.  History of uterine fibroid     Holter monitor, normal 09/02/2015    Sinus rhythm, avg HR 99 bpm (range  bpm). Benign Holter study.  Hyperlipidemia     LLE venous duplex 05/05/2015    Left leg:  No DVT.     Polycystic kidney disease     Psychiatric disorder        Past Surgical History:   Procedure Laterality Date    HX BREAST BIOPSY Right     HX GYN      hysterectomy    HX HYSTERECTOMY      2007    HX ORTHOPAEDIC  12/2014    knee    HX WISDOM TEETH EXTRACTION           Family History:   Problem Relation Age of Onset    Diabetes Other     Heart Disease Other     Hypertension Other     Hypertension Mother     Diabetes Mother     High Cholesterol Mother     Diabetes Father     Hypertension Father     High Cholesterol Father        Social History     Socioeconomic History    Marital status:      Spouse name: Not on file    Number of children: Not on file    Years of education: Not on file    Highest education level: Not on file   Occupational History    Not on file Social Needs    Financial resource strain: Not on file    Food insecurity:     Worry: Not on file     Inability: Not on file    Transportation needs:     Medical: Not on file     Non-medical: Not on file   Tobacco Use    Smoking status: Former Smoker     Last attempt to quit: 2000     Years since quittin.8    Smokeless tobacco: Never Used   Substance and Sexual Activity    Alcohol use: Yes     Comment: socially    Drug use: No    Sexual activity: Yes     Partners: Male     Birth control/protection: None     Comment:    Lifestyle    Physical activity:     Days per week: Not on file     Minutes per session: Not on file    Stress: Not on file   Relationships    Social connections:     Talks on phone: Not on file     Gets together: Not on file     Attends Congregation service: Not on file     Active member of club or organization: Not on file     Attends meetings of clubs or organizations: Not on file     Relationship status: Not on file    Intimate partner violence:     Fear of current or ex partner: Not on file     Emotionally abused: Not on file     Physically abused: Not on file     Forced sexual activity: Not on file   Other Topics Concern     Service Not Asked    Blood Transfusions Not Asked    Caffeine Concern Yes     Comment: 1 cup a day    Occupational Exposure Not Asked   Lori Fent Hazards Not Asked    Sleep Concern Not Asked    Stress Concern Not Asked    Weight Concern Not Asked    Special Diet Not Asked    Back Care Not Asked    Exercise No    Bike Helmet Not Asked    Seat Belt Yes    Self-Exams Not Asked   Social History Narrative    ** Merged History Encounter **              ALLERGIES: Lisinopril and Nsaids (non-steroidal anti-inflammatory drug)    Review of Systems   Constitutional: Negative. HENT: Negative. Respiratory: Negative. Cardiovascular: Negative. Gastrointestinal: Negative. Genitourinary: Negative.     Musculoskeletal: Positive for arthralgias. Skin: Negative. Neurological: Negative. Psychiatric/Behavioral: Negative. Vitals:    11/18/19 0827   Pulse: 82   Resp: 20   Temp: 98.1 °F (36.7 °C)   SpO2: 99%   Weight: 103.4 kg (228 lb)   Height: 5' 6\" (1.676 m)            Physical Exam   Constitutional: She is oriented to person, place, and time. She appears well-developed. Neck: Normal range of motion. Neck supple. Cardiovascular: Normal rate and regular rhythm. Pulmonary/Chest: Effort normal and breath sounds normal.   Musculoskeletal: Normal range of motion. She exhibits tenderness. Patient has point tenderness on the right lateral epicondyles normal neurovascular exam distally in the right hand. No elbow effusion. No induration, no skin changes. Neurological: She is alert and oriented to person, place, and time. Skin: Skin is warm and dry. Psychiatric: She has a normal mood and affect. Nursing note and vitals reviewed. MDM       Procedures    Patient understands and agrees with the disposition and follow-up plan. She would like to start with a nonaggressive treatment of rest and oral pain meds. She agrees to follow-up with orthopedics if symptoms change or worsen.   Mike Shaver MD 9:34 AM

## 2019-11-18 NOTE — LETTER
[address] [place] [address] [facility] EMERG[facility] DE[facility] 
[address] [facility] [address] [phone] Work/School Note Date: 11/18/2019 To Whom It May concern: 
 
Melissa Preston was seen and treated today in the emergency room by the following provider(s): 
Attending Provider: Keenan Leon MD. Melissa Preston may return to work on November 20, 2019.  
 
Sincerely, 
 
 
 
 
Tu Frey MD

## 2019-11-18 NOTE — ED TRIAGE NOTES
Pt c/o right arm pain, mostly in the elbow that started Saturday. Denies injury.  States has had tennis elbow in the past

## 2019-11-27 DIAGNOSIS — E11.9 TYPE 2 DIABETES MELLITUS WITHOUT COMPLICATION, WITHOUT LONG-TERM CURRENT USE OF INSULIN (HCC): ICD-10-CM

## 2019-11-27 DIAGNOSIS — E11.21 TYPE 2 DIABETES WITH NEPHROPATHY (HCC): Primary | ICD-10-CM

## 2019-11-27 RX ORDER — AMOXICILLIN 875 MG/1
875 TABLET, FILM COATED ORAL 2 TIMES DAILY
Qty: 20 TAB | Refills: 0 | Status: SHIPPED | OUTPATIENT
Start: 2019-11-27 | End: 2019-12-07

## 2019-11-27 RX ORDER — FLASH GLUCOSE SENSOR
KIT MISCELLANEOUS
Qty: 2 KIT | Refills: 11 | Status: SHIPPED | OUTPATIENT
Start: 2019-11-27 | End: 2021-01-22 | Stop reason: SDUPTHER

## 2019-12-02 RX ORDER — FLUCONAZOLE 150 MG/1
150 TABLET ORAL
Qty: 3 TAB | Refills: 0 | Status: SHIPPED | OUTPATIENT
Start: 2019-12-02 | End: 2019-12-09

## 2019-12-05 RX ORDER — GLIPIZIDE 10 MG/1
TABLET ORAL
Qty: 180 TAB | Refills: 0 | Status: SHIPPED | OUTPATIENT
Start: 2019-12-05 | End: 2020-03-11

## 2019-12-13 DIAGNOSIS — E55.9 HYPOVITAMINOSIS D: ICD-10-CM

## 2019-12-13 RX ORDER — CLOTRIMAZOLE AND BETAMETHASONE DIPROPIONATE 10; .64 MG/G; MG/G
CREAM TOPICAL
Qty: 15 G | Refills: 1 | Status: SHIPPED | OUTPATIENT
Start: 2019-12-13

## 2019-12-15 RX ORDER — ERGOCALCIFEROL 1.25 MG/1
CAPSULE ORAL
Qty: 12 CAP | Refills: 0 | Status: SHIPPED | OUTPATIENT
Start: 2019-12-15 | End: 2020-04-20

## 2020-01-27 RX ORDER — ROSUVASTATIN CALCIUM 20 MG/1
TABLET, COATED ORAL
Qty: 90 TAB | Refills: 0 | Status: SHIPPED | OUTPATIENT
Start: 2020-01-27 | End: 2020-04-27 | Stop reason: SDUPTHER

## 2020-01-31 RX ORDER — LANCETS
EACH MISCELLANEOUS
Qty: 200 EACH | Refills: 11 | Status: SHIPPED | OUTPATIENT
Start: 2020-01-31 | End: 2021-10-05

## 2020-01-31 NOTE — TELEPHONE ENCOUNTER
Last Visit: 9/24/19 with NP Puneet Payton  Next Appointment: none  Previous Refill Encounter(s): 12/11/18 #200 with 11 refills    Requested Prescriptions     Pending Prescriptions Disp Refills    lancets misc 200 Each 11     Sig: Blood sugar check TID

## 2020-02-05 DIAGNOSIS — F98.8 ATTENTION DEFICIT DISORDER (ADD) WITHOUT HYPERACTIVITY: ICD-10-CM

## 2020-02-05 RX ORDER — DEXTROAMPHETAMINE SACCHARATE, AMPHETAMINE ASPARTATE, DEXTROAMPHETAMINE SULFATE AND AMPHETAMINE SULFATE 5; 5; 5; 5 MG/1; MG/1; MG/1; MG/1
20 TABLET ORAL 2 TIMES DAILY
Qty: 60 TAB | Refills: 0 | Status: SHIPPED | OUTPATIENT
Start: 2020-02-05 | End: 2020-05-26 | Stop reason: SDUPTHER

## 2020-02-05 NOTE — TELEPHONE ENCOUNTER
[facility] date for Adderall as 11/12/19 for a 30 d/s. Last Visit: 9/24/19 with NP Nedra Amezcua  Next Appointment: return in about 4 weeks for HTN, no f/u is scheduled  Previous Refill Encounter(s): 11/12/19 #60    Requested Prescriptions     Pending Prescriptions Disp Refills    dextroamphetamine-amphetamine (ADDERALL) 20 mg tablet 60 Tab 0     Sig: Take 1 Tab by mouth two (2) times a day. Max Daily Amount: 40 mg.

## 2020-02-25 ENCOUNTER — OFFICE VISIT (OUTPATIENT)
Dept: FAMILY MEDICINE CLINIC | Age: 45
End: 2020-02-25

## 2020-02-25 VITALS
TEMPERATURE: 98.1 F | WEIGHT: 228 LBS | DIASTOLIC BLOOD PRESSURE: 78 MMHG | BODY MASS INDEX: 36.64 KG/M2 | HEIGHT: 66 IN | RESPIRATION RATE: 16 BRPM | OXYGEN SATURATION: 98 % | HEART RATE: 92 BPM | SYSTOLIC BLOOD PRESSURE: 130 MMHG

## 2020-02-25 DIAGNOSIS — R68.89 FLU-LIKE SYMPTOMS: ICD-10-CM

## 2020-02-25 DIAGNOSIS — J01.90 ACUTE SINUSITIS, RECURRENCE NOT SPECIFIED, UNSPECIFIED LOCATION: Primary | ICD-10-CM

## 2020-02-25 PROBLEM — E66.01 SEVERE OBESITY (HCC): Status: ACTIVE | Noted: 2020-02-25

## 2020-02-25 LAB
FLUAV+FLUBV AG NOSE QL IA.RAPID: NEGATIVE POS/NEG
FLUAV+FLUBV AG NOSE QL IA.RAPID: NEGATIVE POS/NEG
VALID INTERNAL CONTROL?: YES

## 2020-02-25 RX ORDER — LEVOFLOXACIN 500 MG/1
500 TABLET, FILM COATED ORAL DAILY
Qty: 10 TAB | Refills: 0 | Status: SHIPPED | OUTPATIENT
Start: 2020-02-25 | End: 2020-03-06

## 2020-02-25 NOTE — PROGRESS NOTES
HISTORY OF PRESENT ILLNESS    Leobardo Alfaro is a 38y.o. 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 Respiratory: Negative for hemoptysis, sputum production, shortness of breath and wheezing. Cardiovascular: Negative. Current Outpatient Medications   Medication Sig Dispense Refill    dextroamphetamine-amphetamine (ADDERALL) 20 mg tablet Take 1 Tab by mouth two (2) times a day. Max Daily Amount: 40 mg. 60 Tab 0    lancets misc Blood sugar check  Each 11    glucose blood VI test strips (ONETOUCH VERIO) strip USE TO CHECK GLUCOSE THREE TIMES DAILY 100 Strip 11    rosuvastatin (CRESTOR) 20 mg tablet TAKE 1 TABLET BY MOUTH EVERY NIGHT 90 Tab 0    SITagliptin (JANUVIA) 100 mg tablet TAKE 1 TABLET BY MOUTH DAILY 90 Tab 1    dulaglutide (TRULICITY) 0.10 MQ/9.9 mL sub-q pen 0.5 mL by SubCUTAneous route every seven (7) days. 4 Pen 1    ergocalciferol (ERGOCALCIFEROL) 50,000 unit capsule TAKE ONE CAPSULE BY MOUTH EVERY 7 DAYS 12 Cap 0    clotrimazole-betamethasone (LOTRISONE) topical cream Apply to affected area every 12 hrs x2 weeks 15 g 1    glipiZIDE (GLUCOTROL) 10 mg tablet TAKE 1 TABLET BY MOUTH TWICE DAILY 180 Tab 0    FREESTYLE NOLAN 14 DAY SENSOR kit USE TO CHECK GLUCOSE THREE TIMES DAILY. CHANGE SENSOR EVERY 14 DAYS 2 Kit 11    telmisartan (MICARDIS) 80 mg tablet Take 1 Tab by mouth daily.  90 Tab 3    albuterol (PROVENTIL HFA, VENTOLIN HFA, PROAIR HFA) 90 mcg/actuation inhaler Take 2 Puffs by inhalation every four (4) hours as needed for Wheezing or Shortness of Breath. 1 Inhaler 0    insulin degludec (TRESIBA FLEXTOUCH U-200) 200 unit/mL (3 mL) inpn 20 Units by SubCUTAneous route daily. 9 mL 3    ergocalciferol (ERGOCALCIFEROL) 50,000 unit capsule TAKE ONE CAPSULE BY MOUTH EVERY 7 DAYS 12 Cap 0    flash glucose sensor (FREESTYLE NOLAN 14 DAY SENSOR) kit Check glucose 3 times a day 1 Kit 5    flash glucose scanning reader (FREESTYLE NOLAN 14 DAY READER) misc To use with nolan sensor. Check glucose 3 times a day 2 Each 5    Insulin Needles, Disposable, (REBEKAH PEN NEEDLE) 32 gauge x 5/32\" ndle To be used once daily with Ascletis Genera FlexTouch Pen. 100 Pen Needle 3    Blood-Glucose Meter (ONETOUCH VERIO FLEX START) monitoring kit Check glucose 3 times daily 1 Kit 1     Patient Active Problem List   Diagnosis Code    Diabetes mellitus, type II (HCC) E11.9    Hyperlipidemia E78.5    ADHD (attention deficit hyperactivity disorder) F90.9    Type 2 diabetes with nephropathy ([facility] E11.21     Reviewed past medical, family and social history    OBJECTIVE:  Awake and alert in no acute distress  HEENT: nares patent with erythema, boggy, clear secretions bilaterally. TMs retracted bilaterally, pharynx without erythema, neck supple with shotty lymphadenopathy. Exquisite tenderness over maxillary and frontal  sinus regions bilaterally    Lungs clear throughout  S1 S2 RRR without ectopy or murmur auscultated.   Integumentary: no rashes  Normal skin turgor  Results for orders placed or performed in visit on 02/25/20   AMB POC RAPID INFLUENZA TEST   Result Value Ref Range    VALID INTERNAL CONTROL POC Yes     Influenza A Ag POC Negative Negative Pos/Neg    Influenza B Ag POC Negative Negative Pos/Neg       Visit Vitals  /78 (BP 1 Location: Right arm, BP Patient Position: Sitting)   Pulse 92   Temp 98.1 °F (36.7 °C) (Oral)   Resp 16   Ht 5' 6\" (1.676 m)   Wt 228 lb (103.4 kg) SpO2 98%   BMI 36.80 kg/m²   Diagnoses and all orders for this visit:    Acute sinusitis, recurrence not specified, unspecified location  -     levoFLOXacin (LEVAQUIN) 500 mg tablet; Take 1 Tab by mouth daily for 10 days. , Normal, Disp-10 Tab, R-0    Flu-like symptoms  -     AMB POC RAPID INFLUENZA TEST    BMI 36.0-36.9,adult    Reviewed risks and benefits and common side effects of new medication  Patient agrees with plan and verbalizes understanding. I have discussed the diagnosis with the patient and the intended plan as seen in the above orders. The patient has received an after-visit summary and questions were answered concerning future plans. I have discussed medication side effects and warnings with the patient as well. Follow-up and Dispositions    · Return if symptoms worsen or fail to improve.

## 2020-03-11 RX ORDER — GLIPIZIDE 10 MG/1
TABLET ORAL
Qty: 180 TAB | Refills: 0 | Status: SHIPPED | OUTPATIENT
Start: 2020-03-11 | End: 2020-06-10 | Stop reason: SDUPTHER

## 2020-04-19 DIAGNOSIS — E55.9 HYPOVITAMINOSIS D: ICD-10-CM

## 2020-04-20 RX ORDER — ERGOCALCIFEROL 1.25 MG/1
CAPSULE ORAL
Qty: 12 CAP | Refills: 0 | Status: SHIPPED | OUTPATIENT
Start: 2020-04-20 | End: 2020-07-17

## 2020-04-27 RX ORDER — ROSUVASTATIN CALCIUM 20 MG/1
20 TABLET, COATED ORAL
Qty: 90 TAB | Refills: 0 | Status: SHIPPED | OUTPATIENT
Start: 2020-04-27 | End: 2021-12-16 | Stop reason: SDUPTHER

## 2020-04-27 NOTE — TELEPHONE ENCOUNTER
Last Visit: 9/24/19 with NP Ever Paredes  Next Appointment: none  Previous Refill Encounter(s): 1/27/20 #90    Requested Prescriptions     Pending Prescriptions Disp Refills    rosuvastatin (CRESTOR) 20 mg tablet 90 Tab 0     Sig: Take 1 Tab by mouth nightly.

## 2020-05-18 RX ORDER — PEN NEEDLE, DIABETIC 31 GX3/16"
NEEDLE, DISPOSABLE MISCELLANEOUS
Qty: 100 PEN NEEDLE | Refills: 5 | Status: SHIPPED | OUTPATIENT
Start: 2020-05-18 | End: 2021-07-07 | Stop reason: SDUPTHER

## 2020-05-26 DIAGNOSIS — F98.8 ATTENTION DEFICIT DISORDER (ADD) WITHOUT HYPERACTIVITY: ICD-10-CM

## 2020-05-26 RX ORDER — DEXTROAMPHETAMINE SACCHARATE, AMPHETAMINE ASPARTATE, DEXTROAMPHETAMINE SULFATE AND AMPHETAMINE SULFATE 5; 5; 5; 5 MG/1; MG/1; MG/1; MG/1
20 TABLET ORAL 2 TIMES DAILY
Qty: 60 TAB | Refills: 0 | Status: SHIPPED | OUTPATIENT
Start: 2020-05-26 | End: 2020-07-30 | Stop reason: SDUPTHER

## 2020-06-02 DIAGNOSIS — G43.909 MIGRAINE WITHOUT STATUS MIGRAINOSUS, NOT INTRACTABLE, UNSPECIFIED MIGRAINE TYPE: ICD-10-CM

## 2020-06-02 RX ORDER — BUTALBITAL, ACETAMINOPHEN AND CAFFEINE 50; 325; 40 MG/1; MG/1; MG/1
TABLET ORAL
Qty: 60 TAB | Refills: 2 | Status: SHIPPED | OUTPATIENT
Start: 2020-06-02 | End: 2020-07-30 | Stop reason: SDUPTHER

## 2020-06-10 NOTE — TELEPHONE ENCOUNTER
Last Visit: 2/25/20 with KYLE Fortune  Next Appointment: none  Previous Refill Encounter(s): 3/11/20 #180    Requested Prescriptions     Pending Prescriptions Disp Refills    glipiZIDE (GLUCOTROL) 10 mg tablet 180 Tab 0     Sig: Take 1 Tab by mouth two (2) times a day.

## 2020-06-11 RX ORDER — GLIPIZIDE 10 MG/1
10 TABLET ORAL 2 TIMES DAILY
Qty: 180 TAB | Refills: 0 | Status: SHIPPED | OUTPATIENT
Start: 2020-06-11 | End: 2020-09-14 | Stop reason: SDUPTHER

## 2020-06-17 RX ORDER — INSULIN DEGLUDEC INJECTION 200 U/ML
INJECTION, SOLUTION SUBCUTANEOUS
Qty: 9 ML | Refills: 3 | Status: SHIPPED | OUTPATIENT
Start: 2020-06-17 | End: 2021-07-08 | Stop reason: SDUPTHER

## 2020-07-13 DIAGNOSIS — E55.9 HYPOVITAMINOSIS D: ICD-10-CM

## 2020-07-17 RX ORDER — ERGOCALCIFEROL 1.25 MG/1
CAPSULE ORAL
Qty: 12 CAP | Refills: 0 | Status: SHIPPED | OUTPATIENT
Start: 2020-07-17 | End: 2020-11-18

## 2020-07-27 DIAGNOSIS — Z20.822 CLOSE EXPOSURE TO 2019 NOVEL CORONAVIRUS: Primary | ICD-10-CM

## 2020-07-28 ENCOUNTER — CLINICAL SUPPORT (OUTPATIENT)
Dept: FAMILY MEDICINE CLINIC | Facility: CLINIC | Age: 45
End: 2020-07-28

## 2020-07-28 ENCOUNTER — HOSPITAL ENCOUNTER (OUTPATIENT)
Dept: LAB | Age: 45
Discharge: HOME | End: 2020-07-28
Payer: COMMERCIAL

## 2020-07-28 DIAGNOSIS — Z20.828 EXPOSURE TO SARS-ASSOCIATED CORONAVIRUS: Primary | ICD-10-CM

## 2020-07-28 PROCEDURE — 87635 SARS-COV-2 COVID-19 AMP PRB: CPT

## 2020-07-30 ENCOUNTER — PATIENT MESSAGE (OUTPATIENT)
Dept: FAMILY MEDICINE CLINIC | Age: 45
End: 2020-07-30

## 2020-07-30 DIAGNOSIS — F98.8 ATTENTION DEFICIT DISORDER (ADD) WITHOUT HYPERACTIVITY: ICD-10-CM

## 2020-07-30 DIAGNOSIS — G43.909 MIGRAINE WITHOUT STATUS MIGRAINOSUS, NOT INTRACTABLE, UNSPECIFIED MIGRAINE TYPE: ICD-10-CM

## 2020-07-30 LAB — SARS-COV-2, COV2NT: NOT DETECTED

## 2020-07-30 RX ORDER — BUTALBITAL, ACETAMINOPHEN AND CAFFEINE 50; 325; 40 MG/1; MG/1; MG/1
TABLET ORAL
Qty: 60 TAB | Refills: 2 | Status: SHIPPED | OUTPATIENT
Start: 2020-07-30 | End: 2021-01-19 | Stop reason: SDUPTHER

## 2020-07-30 RX ORDER — DEXTROAMPHETAMINE SACCHARATE, AMPHETAMINE ASPARTATE, DEXTROAMPHETAMINE SULFATE AND AMPHETAMINE SULFATE 5; 5; 5; 5 MG/1; MG/1; MG/1; MG/1
20 TABLET ORAL 2 TIMES DAILY
Qty: 60 TAB | Refills: 0 | Status: SHIPPED | OUTPATIENT
Start: 2020-07-30 | End: 2020-09-27 | Stop reason: SDUPTHER

## 2020-07-30 NOTE — TELEPHONE ENCOUNTER
From: Gian Lincoln  To:  Grey Nascimento NP  Sent: 2020 7:47 AM EDT  Subject: Prescription Question    I would like to have a refill of albuterol inhaler for my son Toño Miranda  7 sent to the [place] [facility].

## 2020-07-30 NOTE — TELEPHONE ENCOUNTER
[facility]der[facility] as [facility]28/20 for a 30 d/s. Last Visit: 9*24/19 with NP Rogelio Partida  Next Appointment: none  Previous Refill Encounter(s): 5/26/20 Adderall #60, 6/2/20 Fioricet #60 with 2 refills    Requested Prescriptions     Pending Prescriptions Disp Refills    dextroamphetamine-amphetamine (ADDERALL) 20 mg tablet 60 Tab 0     Sig: Take 1 Tab by mouth two (2) times a day.  Max Daily Amount: 40 mg.    butalbital-acetaminophen-caffeine (FIORICET, ESGIC) -40 mg per tablet 60 Tab 2     Sig: TAKE 1 TO 2 TABLETS BY MOUTH EVERY 4 TO 6 HOURS AS NEEDED FOR HEADACHE NOT TO EXCEED 6 TABLETS IN 24 HOURS

## 2020-08-08 ENCOUNTER — TELEPHONE (OUTPATIENT)
Dept: FAMILY MEDICINE CLINIC | Age: 45
End: 2020-08-08

## 2020-08-08 RX ORDER — FLUCONAZOLE 150 MG/1
150 TABLET ORAL
Qty: 3 TAB | Refills: 0 | Status: SHIPPED | OUTPATIENT
Start: 2020-08-08 | End: 2020-08-15

## 2020-08-24 ENCOUNTER — E-VISIT (OUTPATIENT)
Dept: FAMILY MEDICINE CLINIC | Age: 45
End: 2020-08-24

## 2020-08-24 DIAGNOSIS — J01.90 ACUTE SINUSITIS, RECURRENCE NOT SPECIFIED, UNSPECIFIED LOCATION: Primary | ICD-10-CM

## 2020-08-24 RX ORDER — LEVOFLOXACIN 500 MG/1
500 TABLET, FILM COATED ORAL DAILY
Qty: 10 TAB | Refills: 0 | Status: SHIPPED | OUTPATIENT
Start: 2020-08-24 | End: 2020-09-03

## 2020-08-24 NOTE — TELEPHONE ENCOUNTER
Last Visit: 9/24/19 with KYLE Bray  Next Appointment: none  Previous Refill Encounter(s): 1/27/20 #90 with 1 refill    Requested Prescriptions     Pending Prescriptions Disp Refills    SITagliptin (Januvia) 100 mg tablet 90 Tab 1     Sig: TAKE 1 TABLET BY MOUTH DAILY

## 2020-08-24 NOTE — TELEPHONE ENCOUNTER
[facility] (1975) initiated an asynchronous digital communication through 2can. HPI: per patient questionnaire     Exam: not applicable    Diagnoses and all orders for this visit:  Diagnoses and all orders for this visit:    1. Acute sinusitis, recurrence not specified, unspecified location    Other orders  -     levoFLOXacin (LEVAQUIN) 500 mg tablet; Take 1 Tab by mouth daily for 10 days. Time: EV1 - 5-10 minutes were spent on the digital evaluation and management of this patient.       Avila Vicente NP

## 2020-09-14 NOTE — TELEPHONE ENCOUNTER
Last Visit: 9/24/19 with KYLE Clement  Next Appointment: none  Previous Refill Encounter(s): 6/11/20 #180    Requested Prescriptions     Pending Prescriptions Disp Refills    glipiZIDE (GLUCOTROL) 10 mg tablet 180 Tab 0     Sig: Take 1 Tab by mouth two (2) times a day.

## 2020-09-15 RX ORDER — GLIPIZIDE 10 MG/1
10 TABLET ORAL 2 TIMES DAILY
Qty: 180 TAB | Refills: 0 | Status: SHIPPED | OUTPATIENT
Start: 2020-09-15 | End: 2020-12-16 | Stop reason: SDUPTHER

## 2020-09-18 ENCOUNTER — TELEPHONE (OUTPATIENT)
Dept: FAMILY MEDICINE CLINIC | Age: 45
End: 2020-09-18

## 2020-09-18 ENCOUNTER — VIRTUAL VISIT (OUTPATIENT)
Dept: FAMILY MEDICINE CLINIC | Age: 45
End: 2020-09-18

## 2020-09-18 ENCOUNTER — E-VISIT (OUTPATIENT)
Dept: FAMILY MEDICINE CLINIC | Age: 45
End: 2020-09-18

## 2020-09-18 DIAGNOSIS — Q61.3 POLYCYSTIC KIDNEY DISEASE: ICD-10-CM

## 2020-09-18 DIAGNOSIS — I10 ESSENTIAL HYPERTENSION: Primary | ICD-10-CM

## 2020-09-18 RX ORDER — VALSARTAN 160 MG/1
160 TABLET ORAL DAILY
Qty: 30 TAB | Refills: 2 | Status: SHIPPED | OUTPATIENT
Start: 2020-09-18 | End: 2020-09-27 | Stop reason: SDUPTHER

## 2020-09-18 NOTE — PROGRESS NOTES
Roly Watson is a 41 y.o. female, evaluated via audio-only technology on 9/18/2020 for Hypertension  . Assessment & Plan:   Diagnoses and all orders for this visit:    1. Essential hypertension    2. Polycystic kidney disease     begin Diovan 160 mg daily, discontinue micardis  Advised to follow up with nephrologist for PKD    Follow-up and Dispositions    · Return in about 4 weeks (around 10/16/2020) for HTN. 12  Subjective:   Patient states for the last several weeks her blood pressure has been elevated. She also reports she has been having headaches. Cardiovascular Review:  The patient has diabetes, hyperlipidemia and obesity. Diet and Lifestyle: generally follows a low fat low cholesterol diet, generally follows a low sodium diet, follows a diabetic diet regularly, exercises sporadically  Home BP Monitoring: is not well controlled at home, ranging 150's/low 100's. Pertinent ROS: taking medications as instructed, no medication side effects noted, no TIA's, no chest pain on exertion, no dyspnea on exertion, no swelling of ankles, persistent headaches for the last several weeks. Prior to Admission medications    Medication Sig Start Date End Date Taking? Authorizing Provider   glipiZIDE (GLUCOTROL) 10 mg tablet Take 1 Tab by mouth two (2) times a day. 9/15/20   Luciana WISE NP   SITagliptin (Januvia) 100 mg tablet TAKE 1 TABLET BY MOUTH DAILY 8/24/20   Luciana WISE NP   dextroamphetamine-amphetamine (ADDERALL) 20 mg tablet Take 1 Tab by mouth two (2) times a day.  Max Daily Amount: 40 mg. 7/30/20   Luciana WISE NP   butalbital-acetaminophen-caffeine (FIORICET, ESGIC) -40 mg per tablet TAKE 1 TO 2 TABLETS BY MOUTH EVERY 4 TO 6 HOURS AS NEEDED FOR HEADACHE NOT TO EXCEED 6 TABLETS IN 24 HOURS 7/30/20   Luciana WISE NP   ergocalciferol (ERGOCALCIFEROL) 1,250 mcg (50,000 unit) capsule TAKE 1 CAPSULE BY MOUTH EVERY 7 DAYS 7/17/20   Angela Rainey MD   [place] FlexTouch U-200 200 unit/mL (3 mL) inpn INJECT 20 UNITS BENEATH THE SKIN DAILY 6/17/20   Brendan WISE NP   Insulin Needles, Disposable, (Miranda Pen Needle) 32 gauge x 5/32\" ndle USE ONCE DAILY WITH TRESIBA 5/18/20   Brendan WISE NP   rosuvastatin (CRESTOR) 20 mg tablet Take 1 Tab by mouth nightly. 4/27/20   Brendan WISE NP   lancets misc Blood sugar check TID 1/31/20   Brendan WISE NP   glucose blood VI test strips (ONETOUCH VERIO) strip USE TO CHECK GLUCOSE THREE TIMES DAILY 1/28/20   Brendan WISE NP   dulaglutide (TRULICITY) 1.55 CX/1.5 mL sub-q pen 0.5 mL by SubCUTAneous route every seven (7) days. 1/6/20   Brendan WISE NP   clotrimazole-betamethasone (LOTRISONE) topical cream Apply to affected area every 12 hrs x2 weeks 12/13/19   Evita Maravilla NP   FREESTYLE NOEMY 14 DAY SENSOR kit USE TO CHECK GLUCOSE THREE TIMES DAILY. CHANGE SENSOR EVERY 14 DAYS 11/27/19   Brendan WISE NP   telmisartan (MICARDIS) 80 mg tablet Take 1 Tab by mouth daily. 11/8/19   Evita Maravilla NP   albuterol (PROVENTIL HFA, VENTOLIN HFA, PROAIR HFA) 90 mcg/actuation inhaler Take 2 Puffs by inhalation every four (4) hours as needed for Wheezing or Shortness of Breath. 8/23/19   Brendan WISE NP   ergocalciferol (ERGOCALCIFEROL) 50,000 unit capsule TAKE ONE CAPSULE BY MOUTH EVERY 7 DAYS 6/20/19   Brendan WISE NP   flash glucose sensor (FREESTYLE NOEMY 14 DAY SENSOR) kit Check glucose 3 times a day 4/24/19   Brendan WISE NP   flash glucose scanning reader (FREESTYLE NOEMY 14 DAY READER) misc To use with noemy sensor.  Check glucose 3 times a day 4/16/19   Brendan WISE NP   Blood-Glucose Meter (ONETOUCH VERIO FLEX START) monitoring kit Check glucose 3 times daily 5/21/18   Evita Maravilla NP     Patient Active Problem List   Diagnosis Code    Diabetes mellitus, type II ([address] E11.9    Hyperlipidemia E78.5    ADHD (attention deficit hyperactivity disorder) F90.9    Type 2 diabetes with nephropathy ([email or web address] E11.21    Severe obesity [facility]) E66.01     Patient Active Problem List    Diagnosis Date Noted    Severe obesity ([facility] 2020    Type 2 diabetes with nephropathy ([facility] 2018    ADHD (attention deficit hyperactivity disorder)     Diabetes mellitus, type II ([facility]     Hyperlipidemia      Allergies   Allergen Reactions    Lisinopril Cough    Nsaids (Non-Steroidal Anti-Inflammatory Drug) Other (comments)     Polycystic kidney disease     Past Medical History:   Diagnosis Date    ADHD (attention deficit hyperactivity disorder)     Diabetes ([facility]     Diabetes mellitus, type II ([facility]     History of echocardiogram 2015    EF 60-65%. No RWMA. Mild conc LVH. No significant valvular heart disease.  History of uterine fibroid     Holter monitor, normal 2015    Sinus rhythm, avg HR 99 bpm (range  bpm). Benign Holter study.  Hyperlipidemia     LLE venous duplex 2015    Left leg:  No DVT.  Polycystic kidney disease     Psychiatric disorder      Past Surgical History:   Procedure Laterality Date    HX BREAST BIOPSY Right     HX GYN      hysterectomy    HX HYSTERECTOMY          HX ORTHOPAEDIC  2014    knee    HX WISDOM TEETH EXTRACTION       Family History   Problem Relation Age of Onset    Diabetes Other     Heart Disease Other     Hypertension Other     Hypertension Mother     Diabetes Mother     High Cholesterol Mother     Diabetes Father     Hypertension Father     High Cholesterol Father      Social History     Tobacco Use    Smoking status: Former Smoker     Last attempt to quit: 2000     Years since quittin.7    Smokeless tobacco: Never Used   Substance Use Topics    Alcohol use: Yes     Comment: socially       Review of Systems   Constitutional: Negative for chills and fever. Respiratory: Negative for shortness of breath. Cardiovascular: Negative for chest pain and palpitations. Neurological: Positive for headaches. Negative for dizziness.        No flowsheet data found. Charisse Matson, who was evaluated through a patient-initiated, synchronous (real-time) audio only encounter, and/or her healthcare decision maker, is aware that it is a billable service, with coverage as determined by her insurance carrier. She provided verbal consent to proceed: Yes. She has not had a related appointment within my department in the past 7 days or scheduled within the next 24 hours.       Total Time: minutes: 11-20 minutes    Ozzy Sykes NP

## 2020-09-27 DIAGNOSIS — F98.8 ATTENTION DEFICIT DISORDER (ADD) WITHOUT HYPERACTIVITY: ICD-10-CM

## 2020-09-27 RX ORDER — DEXTROAMPHETAMINE SACCHARATE, AMPHETAMINE ASPARTATE, DEXTROAMPHETAMINE SULFATE AND AMPHETAMINE SULFATE 5; 5; 5; 5 MG/1; MG/1; MG/1; MG/1
20 TABLET ORAL 2 TIMES DAILY
Qty: 60 TAB | Refills: 0 | Status: SHIPPED | OUTPATIENT
Start: 2020-09-27 | End: 2021-01-19 | Stop reason: SDUPTHER

## 2020-09-27 RX ORDER — VALSARTAN 160 MG/1
160 TABLET ORAL DAILY
Qty: 90 TAB | Refills: 1 | Status: SHIPPED | OUTPATIENT
Start: 2020-09-27 | End: 2021-02-23 | Stop reason: SDUPTHER

## 2020-09-27 NOTE — TELEPHONE ENCOUNTER
I have reviewed the patients controlled substance prescription history, as maintained in the [facility]. There is no suspicious activity noted. Usage is consistent with current use of prescribed medications.

## 2020-11-18 DIAGNOSIS — E55.9 HYPOVITAMINOSIS D: ICD-10-CM

## 2020-11-18 RX ORDER — ERGOCALCIFEROL 1.25 MG/1
CAPSULE ORAL
Qty: 12 CAP | Refills: 0 | Status: CANCELLED | OUTPATIENT
Start: 2020-11-18

## 2020-11-18 RX ORDER — ERGOCALCIFEROL 1.25 MG/1
CAPSULE ORAL
Qty: 12 CAP | Refills: 0 | Status: SHIPPED | OUTPATIENT
Start: 2020-11-18 | End: 2021-02-09

## 2020-12-16 NOTE — TELEPHONE ENCOUNTER
Last Visit: 9/18/20 with NP Star Mahmood  Next Appointment: Advised to follow-up in 4 weeks  Previous Refill Encounter(s): 9/15/20 #180    Requested Prescriptions     Pending Prescriptions Disp Refills    glipiZIDE (GLUCOTROL) 10 mg tablet 180 Tab 0     Sig: Take 1 Tab by mouth two (2) times a day.

## 2020-12-18 RX ORDER — GLIPIZIDE 10 MG/1
10 TABLET ORAL 2 TIMES DAILY
Qty: 180 TAB | Refills: 0 | Status: SHIPPED | OUTPATIENT
Start: 2020-12-18 | End: 2021-03-17 | Stop reason: SDUPTHER

## 2021-01-18 DIAGNOSIS — R05.9 COUGH: Primary | ICD-10-CM

## 2021-01-18 DIAGNOSIS — R09.81 NASAL CONGESTION: ICD-10-CM

## 2021-01-18 DIAGNOSIS — J02.9 SORE THROAT: ICD-10-CM

## 2021-01-18 NOTE — PROGRESS NOTES
Patient called and reports she has been having intermittent nasal congestion, mild sore throat, chest heaviness, and cough that began yesterday. Requesting COVID testing.   Orders placed and patient given contact information for [facility]

## 2021-01-19 ENCOUNTER — HOSPITAL ENCOUNTER (OUTPATIENT)
Dept: LAB | Age: 46
Discharge: HOME | End: 2021-01-19
Payer: COMMERCIAL

## 2021-01-19 ENCOUNTER — CLINICAL SUPPORT (OUTPATIENT)
Dept: FAMILY MEDICINE CLINIC | Age: 46
End: 2021-01-19

## 2021-01-19 DIAGNOSIS — J02.9 SORE THROAT: ICD-10-CM

## 2021-01-19 DIAGNOSIS — R05.9 COUGH: Primary | ICD-10-CM

## 2021-01-19 DIAGNOSIS — R05.9 COUGH: ICD-10-CM

## 2021-01-19 DIAGNOSIS — F98.8 ATTENTION DEFICIT DISORDER (ADD) WITHOUT HYPERACTIVITY: ICD-10-CM

## 2021-01-19 DIAGNOSIS — G43.909 MIGRAINE WITHOUT STATUS MIGRAINOSUS, NOT INTRACTABLE, UNSPECIFIED MIGRAINE TYPE: ICD-10-CM

## 2021-01-19 DIAGNOSIS — R09.81 NASAL CONGESTION: ICD-10-CM

## 2021-01-19 PROCEDURE — U0003 INFECTIOUS AGENT DETECTION BY NUCLEIC ACID (DNA OR RNA); SEVERE ACUTE RESPIRATORY SYNDROME CORONAVIRUS 2 (SARS-COV-2) (CORONAVIRUS DISEASE [COVID-19]), AMPLIFIED PROBE TECHNIQUE, MAKING USE OF HIGH THROUGHPUT TECHNOLOGIES AS DESCRIBED BY CMS-2020-01-R: HCPCS

## 2021-01-19 RX ORDER — DEXTROAMPHETAMINE SACCHARATE, AMPHETAMINE ASPARTATE, DEXTROAMPHETAMINE SULFATE AND AMPHETAMINE SULFATE 5; 5; 5; 5 MG/1; MG/1; MG/1; MG/1
20 TABLET ORAL 2 TIMES DAILY
Qty: 60 TAB | Refills: 0 | Status: SHIPPED | OUTPATIENT
Start: 2021-01-19 | End: 2021-05-16 | Stop reason: SDUPTHER

## 2021-01-19 RX ORDER — BUTALBITAL, ACETAMINOPHEN AND CAFFEINE 50; 325; 40 MG/1; MG/1; MG/1
TABLET ORAL
Qty: 60 TAB | Refills: 2 | Status: SHIPPED | OUTPATIENT
Start: 2021-01-19 | End: 2021-05-16 | Stop reason: SDUPTHER

## 2021-01-19 NOTE — TELEPHONE ENCOUNTER
[facility] 9[facility]27/20 for a 30 d/s. Last Visit: 9/18/20 with NP Lori Lopez  Next Appointment: Advised to follow-up in 4 weeks  Previous Refill Encounter(s): 7/30/20 Fioricet #60 with 2 refills, 9/27/20 Adderall #60    Requested Prescriptions     Pending Prescriptions Disp Refills    butalbital-acetaminophen-caffeine (FIORICET, ESGIC) -40 mg per tablet 60 Tab 2     Sig: TAKE 1 TO 2 TABLETS BY MOUTH EVERY 4 TO 6 HOURS AS NEEDED FOR HEADACHE NOT TO EXCEED 6 TABLETS IN 24 HOURS    dextroamphetamine-amphetamine (ADDERALL) 20 mg tablet 60 Tab 0     Sig: Take 1 Tab by mouth two (2) times a day. Max Daily Amount: 40 mg.

## 2021-01-20 ENCOUNTER — TRANSCRIBE ORDER (OUTPATIENT)
Dept: SCHEDULING | Age: 46
End: 2021-01-20

## 2021-01-20 ENCOUNTER — E-VISIT (OUTPATIENT)
Dept: FAMILY MEDICINE CLINIC | Age: 46
End: 2021-01-20
Payer: COMMERCIAL

## 2021-01-20 DIAGNOSIS — J06.9 ACUTE URI: Primary | ICD-10-CM

## 2021-01-20 DIAGNOSIS — Z12.31 ENCOUNTER FOR SCREENING MAMMOGRAM FOR BREAST CANCER: Primary | ICD-10-CM

## 2021-01-20 LAB — SARS-COV-2, COV2NT: NOT DETECTED

## 2021-01-20 PROCEDURE — 99421 OL DIG E/M SVC 5-10 MIN: CPT | Performed by: NURSE PRACTITIONER

## 2021-01-20 RX ORDER — ALBUTEROL SULFATE 90 UG/1
2 AEROSOL, METERED RESPIRATORY (INHALATION)
Qty: 1 INHALER | Refills: 0 | Status: SHIPPED | OUTPATIENT
Start: 2021-01-20 | End: 2021-02-04 | Stop reason: SDUPTHER

## 2021-01-20 RX ORDER — PROMETHAZINE HYDROCHLORIDE AND CODEINE PHOSPHATE 6.25; 1 MG/5ML; MG/5ML
5-10 SOLUTION ORAL
Qty: 120 ML | Refills: 0 | Status: SHIPPED | OUTPATIENT
Start: 2021-01-20 | End: 2021-01-23

## 2021-01-22 DIAGNOSIS — E11.9 TYPE 2 DIABETES MELLITUS WITHOUT COMPLICATION, WITHOUT LONG-TERM CURRENT USE OF INSULIN (HCC): ICD-10-CM

## 2021-01-22 RX ORDER — FLASH GLUCOSE SENSOR
KIT MISCELLANEOUS
Qty: 2 KIT | Refills: 11 | Status: SHIPPED | OUTPATIENT
Start: 2021-01-22 | End: 2021-09-24 | Stop reason: SDUPTHER

## 2021-01-22 NOTE — TELEPHONE ENCOUNTER
Last Visit: 9/18/20 with NP Elizabeth Tenorio  Next Appointment: none  Previous Refill Encounter(s): 11/27/19 #2 with 11 refills    Requested Prescriptions     Pending Prescriptions Disp Refills    flash glucose sensor (FreeStyle Esmer 14 Day Sensor) kit 2 Kit 11     Sig: USE TO CHECK GLUCOSE THREE TIMES DAILY.  CHANGE SENSOR EVERY 14 DAYS

## 2021-02-04 NOTE — TELEPHONE ENCOUNTER
Last Visit: 9/18/20 with NP Urban Kam  Next Appointment: none  Previous Refill Encounter(s): 1/20/21 #1    Requested Prescriptions     Pending Prescriptions Disp Refills    albuterol (PROVENTIL HFA, VENTOLIN HFA, PROAIR HFA) 90 mcg/actuation inhaler 1 Inhaler 0     Sig: Take 2 Puffs by inhalation every four (4) hours as needed for Wheezing or Shortness of Breath.

## 2021-02-07 RX ORDER — ALBUTEROL SULFATE 90 UG/1
2 AEROSOL, METERED RESPIRATORY (INHALATION)
Qty: 1 INHALER | Refills: 0 | Status: SHIPPED | OUTPATIENT
Start: 2021-02-07 | End: 2022-01-05 | Stop reason: SDUPTHER

## 2021-02-08 DIAGNOSIS — E55.9 HYPOVITAMINOSIS D: ICD-10-CM

## 2021-02-09 RX ORDER — ERGOCALCIFEROL 1.25 MG/1
CAPSULE ORAL
Qty: 12 CAP | Refills: 0 | Status: SHIPPED | OUTPATIENT
Start: 2021-02-09 | End: 2021-05-28

## 2021-02-23 NOTE — TELEPHONE ENCOUNTER
Last Visit: 9/18/20 with NP Helga Husain  Next Appointment: none  Previous Refill Encounter(s): 9/27/20 Diovan #90 with 1 refill, 8/24/20 Januvia #90 with 1 refill    Requested Prescriptions     Pending Prescriptions Disp Refills    valsartan (DIOVAN) 160 mg tablet 90 Tab 1     Sig: Take 1 Tab by mouth daily.     SITagliptin (Januvia) 100 mg tablet 90 Tab 1     Sig: TAKE 1 TABLET BY MOUTH DAILY

## 2021-02-27 DIAGNOSIS — E78.00 PURE HYPERCHOLESTEROLEMIA: ICD-10-CM

## 2021-02-27 DIAGNOSIS — I10 ESSENTIAL HYPERTENSION: ICD-10-CM

## 2021-02-27 DIAGNOSIS — Z11.59 NEED FOR HEPATITIS C SCREENING TEST: Primary | ICD-10-CM

## 2021-02-27 DIAGNOSIS — E11.9 TYPE 2 DIABETES MELLITUS WITHOUT COMPLICATION, WITHOUT LONG-TERM CURRENT USE OF INSULIN (HCC): ICD-10-CM

## 2021-02-27 RX ORDER — VALSARTAN 160 MG/1
160 TABLET ORAL DAILY
Qty: 90 TAB | Refills: 1 | Status: SHIPPED | OUTPATIENT
Start: 2021-02-27 | End: 2021-09-03

## 2021-03-17 RX ORDER — GLIPIZIDE 10 MG/1
10 TABLET ORAL 2 TIMES DAILY
Qty: 180 TAB | Refills: 0 | Status: SHIPPED | OUTPATIENT
Start: 2021-03-17 | End: 2021-06-17 | Stop reason: SDUPTHER

## 2021-03-17 NOTE — TELEPHONE ENCOUNTER
Last Visit: 1/20/21 E-visit with KYLE Chang, 9/18/20 VV with KYLE Chang  Next Appointment: none  Previous Refill Encounter(s): 12/18/20 #180    Requested Prescriptions     Pending Prescriptions Disp Refills    glipiZIDE (GLUCOTROL) 10 mg tablet 180 Tab 0     Sig: Take 1 Tab by mouth two (2) times a day.

## 2021-04-02 ENCOUNTER — HOSPITAL ENCOUNTER (OUTPATIENT)
Dept: MAMMOGRAPHY | Age: 46
Discharge: HOME | End: 2021-04-02
Attending: NURSE PRACTITIONER
Payer: COMMERCIAL

## 2021-04-02 DIAGNOSIS — Z12.31 ENCOUNTER FOR SCREENING MAMMOGRAM FOR BREAST CANCER: ICD-10-CM

## 2021-04-02 PROCEDURE — 77067 SCR MAMMO BI INCL CAD: CPT

## 2021-05-14 DIAGNOSIS — Z13.1 SCREENING FOR DIABETES MELLITUS (DM): Primary | ICD-10-CM

## 2021-05-16 DIAGNOSIS — F98.8 ATTENTION DEFICIT DISORDER (ADD) WITHOUT HYPERACTIVITY: ICD-10-CM

## 2021-05-16 DIAGNOSIS — G43.909 MIGRAINE WITHOUT STATUS MIGRAINOSUS, NOT INTRACTABLE, UNSPECIFIED MIGRAINE TYPE: ICD-10-CM

## 2021-05-17 NOTE — TELEPHONE ENCOUNTER
[facility]der[facility] as 1/19/21 for a 30 d/s. Last Visit: 9/18/20 VV with NP Jaguar Caraballo, 1/20/21 E-visit with KYLE Caraballo  Next Appointment: none  Previous Refill Encounter(s): 1/19/21 Adderall #60, Fioricet #60 with 2 refills    Requested Prescriptions     Pending Prescriptions Disp Refills    butalbital-acetaminophen-caffeine (FIORICET, ESGIC) -40 mg per tablet 60 Tab 2     Sig: TAKE 1 TO 2 TABLETS BY MOUTH EVERY 4 TO 6 HOURS AS NEEDED FOR HEADACHE NOT TO EXCEED 6 TABLETS IN 24 HOURS    dextroamphetamine-amphetamine (ADDERALL) 20 mg tablet 60 Tab 0     Sig: Take 1 Tab by mouth two (2) times a day. Max Daily Amount: 40 mg.

## 2021-05-18 RX ORDER — DEXTROAMPHETAMINE SACCHARATE, AMPHETAMINE ASPARTATE, DEXTROAMPHETAMINE SULFATE AND AMPHETAMINE SULFATE 5; 5; 5; 5 MG/1; MG/1; MG/1; MG/1
20 TABLET ORAL 2 TIMES DAILY
Qty: 60 TAB | Refills: 0 | Status: SHIPPED | OUTPATIENT
Start: 2021-05-18 | End: 2021-08-24 | Stop reason: SDUPTHER

## 2021-05-18 RX ORDER — BUTALBITAL, ACETAMINOPHEN AND CAFFEINE 50; 325; 40 MG/1; MG/1; MG/1
TABLET ORAL
Qty: 60 TAB | Refills: 2 | Status: SHIPPED | OUTPATIENT
Start: 2021-05-18 | End: 2021-08-24 | Stop reason: SDUPTHER

## 2021-05-24 DIAGNOSIS — E55.9 HYPOVITAMINOSIS D: ICD-10-CM

## 2021-05-28 RX ORDER — ERGOCALCIFEROL 1.25 MG/1
CAPSULE ORAL
Qty: 12 CAPSULE | Refills: 0 | Status: SHIPPED | OUTPATIENT
Start: 2021-05-28 | End: 2021-08-26 | Stop reason: SDUPTHER

## 2021-06-17 NOTE — TELEPHONE ENCOUNTER
Last Visit: 1/20/21 E-visit with KYLE Nice  Next Appointment: 6/23/21 with KYLE Nice  Previous Refill Encounter(s): 3/17/21 #180    Requested Prescriptions     Pending Prescriptions Disp Refills    glipiZIDE (GLUCOTROL) 10 mg tablet 180 Tablet 1     Sig: Take 1 Tablet by mouth two (2) times a day.

## 2021-06-18 RX ORDER — GLIPIZIDE 10 MG/1
10 TABLET ORAL 2 TIMES DAILY
Qty: 180 TABLET | Refills: 1 | Status: SHIPPED | OUTPATIENT
Start: 2021-06-18 | End: 2021-10-07 | Stop reason: ALTCHOICE

## 2021-06-23 ENCOUNTER — VIRTUAL VISIT (OUTPATIENT)
Dept: FAMILY MEDICINE CLINIC | Age: 46
End: 2021-06-23

## 2021-06-23 DIAGNOSIS — E78.00 PURE HYPERCHOLESTEROLEMIA: ICD-10-CM

## 2021-06-23 DIAGNOSIS — E55.9 HYPOVITAMINOSIS D: ICD-10-CM

## 2021-06-23 DIAGNOSIS — E11.9 TYPE 2 DIABETES MELLITUS WITHOUT COMPLICATION, WITHOUT LONG-TERM CURRENT USE OF INSULIN (HCC): ICD-10-CM

## 2021-06-23 DIAGNOSIS — I10 ESSENTIAL HYPERTENSION: ICD-10-CM

## 2021-06-23 NOTE — PROGRESS NOTES
Needs an in office visit. Will reschedule for in office with labs 1 week prior. This encounter was created in error - please disregard.

## 2021-07-07 NOTE — TELEPHONE ENCOUNTER
Last Visit: 1/20/21 E-visit with KYLE Diggs, 9/18/20 VV with KYLE Diggs  Next Appointment: 7/15/21 with KYLE Diggs  Previous Refill Encounter(s): 5/28/20 #100 with 5 refills    Requested Prescriptions     Pending Prescriptions Disp Refills    Insulin Needles, Disposable, (Miranda Pen Needle) 32 gauge x 5/32\" ndle 100 Pen Needle 5     Sig: USE ONCE DAILY WITH TRESIBA

## 2021-07-08 RX ORDER — PEN NEEDLE, DIABETIC 31 GX3/16"
NEEDLE, DISPOSABLE MISCELLANEOUS
Qty: 100 PEN NEEDLE | Refills: 5 | Status: SHIPPED | OUTPATIENT
Start: 2021-07-08 | End: 2021-10-21 | Stop reason: SDUPTHER

## 2021-07-08 RX ORDER — INSULIN DEGLUDEC INJECTION 200 U/ML
INJECTION, SOLUTION SUBCUTANEOUS
Qty: 9 ML | Refills: 5 | Status: SHIPPED | OUTPATIENT
Start: 2021-07-08 | End: 2021-10-21

## 2021-08-24 DIAGNOSIS — F98.8 ATTENTION DEFICIT DISORDER (ADD) WITHOUT HYPERACTIVITY: ICD-10-CM

## 2021-08-24 DIAGNOSIS — G43.909 MIGRAINE WITHOUT STATUS MIGRAINOSUS, NOT INTRACTABLE, UNSPECIFIED MIGRAINE TYPE: ICD-10-CM

## 2021-08-24 DIAGNOSIS — E55.9 HYPOVITAMINOSIS D: ICD-10-CM

## 2021-08-24 NOTE — TELEPHONE ENCOUNTER
[facility]derall as 5/18/21 for a 30 d/s. Last Visit: 9/18/20 with NP Ciara Richards  Next Appointment: pt cancelled appt 7/15/21  Previous Refill Encounter(s): 5/18/21 Fioricet #60 with 2 refills, Adderall #60    Requested Prescriptions     Pending Prescriptions Disp Refills    butalbital-acetaminophen-caffeine (FIORICET, ESGIC) -40 mg per tablet 60 Tablet 2     Sig: TAKE 1 TO 2 TABLETS BY MOUTH EVERY 4 TO 6 HOURS AS NEEDED FOR HEADACHE NOT TO EXCEED 6 TABLETS IN 24 HOURS    dextroamphetamine-amphetamine (ADDERALL) 20 mg tablet 60 Tablet 0     Sig: Take 1 Tablet by mouth two (2) times a day. Max Daily Amount: 40 mg.

## 2021-08-24 NOTE — TELEPHONE ENCOUNTER
Last Visit: 9/18/20 with NP Lelia Brewster  Next Appointment: 7/15/21 pt cancelled appt  Previous Refill Encounter(s): 5/28/21 #12    Requested Prescriptions     Pending Prescriptions Disp Refills    ergocalciferol (ERGOCALCIFEROL) 1,250 mcg (50,000 unit) capsule 12 Capsule 0     Sig: Take 1 Capsule by mouth every seven (7) days.

## 2021-08-26 DIAGNOSIS — Z20.822 CLOSE EXPOSURE TO COVID-19 VIRUS: Primary | ICD-10-CM

## 2021-08-26 RX ORDER — ERGOCALCIFEROL 1.25 MG/1
50000 CAPSULE ORAL
Qty: 12 CAPSULE | Refills: 0 | OUTPATIENT
Start: 2021-08-26

## 2021-08-26 RX ORDER — DEXTROAMPHETAMINE SACCHARATE, AMPHETAMINE ASPARTATE, DEXTROAMPHETAMINE SULFATE AND AMPHETAMINE SULFATE 5; 5; 5; 5 MG/1; MG/1; MG/1; MG/1
20 TABLET ORAL 2 TIMES DAILY
Qty: 60 TABLET | Refills: 0 | Status: SHIPPED | OUTPATIENT
Start: 2021-08-26 | End: 2022-03-15

## 2021-08-26 RX ORDER — BUTALBITAL, ACETAMINOPHEN AND CAFFEINE 50; 325; 40 MG/1; MG/1; MG/1
TABLET ORAL
Qty: 60 TABLET | Refills: 2 | Status: SHIPPED | OUTPATIENT
Start: 2021-08-26

## 2021-08-26 RX ORDER — ERGOCALCIFEROL 1.25 MG/1
50000 CAPSULE ORAL
Qty: 12 CAPSULE | Refills: 0 | Status: SHIPPED | OUTPATIENT
Start: 2021-08-26 | End: 2021-09-14

## 2021-09-03 RX ORDER — VALSARTAN 160 MG/1
TABLET ORAL
Qty: 90 TABLET | Refills: 1 | Status: SHIPPED | OUTPATIENT
Start: 2021-09-03 | End: 2021-10-05 | Stop reason: SDUPTHER

## 2021-09-06 DIAGNOSIS — E55.9 HYPOVITAMINOSIS D: ICD-10-CM

## 2021-09-14 RX ORDER — ERGOCALCIFEROL 1.25 MG/1
CAPSULE ORAL
Qty: 12 CAPSULE | Refills: 0 | Status: SHIPPED | OUTPATIENT
Start: 2021-09-14 | End: 2022-03-02

## 2021-09-15 ENCOUNTER — E-VISIT (OUTPATIENT)
Dept: OTHER | Age: 46
End: 2021-09-15
Payer: COMMERCIAL

## 2021-09-15 DIAGNOSIS — G43.819 OTHER MIGRAINE WITHOUT STATUS MIGRAINOSUS, INTRACTABLE: Primary | ICD-10-CM

## 2021-09-15 PROCEDURE — 99421 OL DIG E/M SVC 5-10 MIN: CPT | Performed by: NURSE PRACTITIONER

## 2021-09-21 RX ORDER — RIZATRIPTAN BENZOATE 10 MG/1
10 TABLET ORAL
Qty: 10 TABLET | Refills: 1 | Status: SHIPPED | OUTPATIENT
Start: 2021-09-21 | End: 2021-09-21

## 2021-09-23 ENCOUNTER — PATIENT MESSAGE (OUTPATIENT)
Dept: FAMILY MEDICINE CLINIC | Age: 46
End: 2021-09-23

## 2021-09-23 DIAGNOSIS — E11.9 TYPE 2 DIABETES MELLITUS WITHOUT COMPLICATION, WITHOUT LONG-TERM CURRENT USE OF INSULIN (HCC): ICD-10-CM

## 2021-09-24 RX ORDER — FLASH GLUCOSE SENSOR
KIT MISCELLANEOUS
Qty: 6 KIT | Refills: 3 | Status: SHIPPED | OUTPATIENT
Start: 2021-09-24 | End: 2022-03-15

## 2021-09-24 NOTE — TELEPHONE ENCOUNTER
Patient is requesting a 90 day supply    Last Visit: 9/18/20 with KYLE Last  Next Appointment: 7/15/21 pt cancelled appt    Requested Prescriptions     Pending Prescriptions Disp Refills    flash glucose sensor (FreeStyle Esmer 14 Day Sensor) kit 6 Kit 3     Sig: USE TO CHECK GLUCOSE THREE TIMES DAILY.  CHANGE SENSOR EVERY 14 DAYS

## 2021-09-24 NOTE — TELEPHONE ENCOUNTER
From: Consuelo Lieberman  To: Pj Baez NP  Sent: 9/23/2021 4:20 PM EDT  Subject: Prescription Question    I would like to request a 90 day supply of the Freestyle Esmer 14 day Sensors. My current script is for a 30 day supply.

## 2021-09-28 DIAGNOSIS — G43.819 OTHER MIGRAINE WITHOUT STATUS MIGRAINOSUS, INTRACTABLE: ICD-10-CM

## 2021-09-28 RX ORDER — RIZATRIPTAN BENZOATE 10 MG/1
10 TABLET, ORALLY DISINTEGRATING ORAL
Qty: 10 TABLET | Refills: 1 | Status: SHIPPED | OUTPATIENT
Start: 2021-09-28 | End: 2021-09-28

## 2021-09-29 ENCOUNTER — APPOINTMENT (OUTPATIENT)
Dept: CT IMAGING | Age: 46
End: 2021-09-29
Attending: STUDENT IN AN ORGANIZED HEALTH CARE EDUCATION/TRAINING PROGRAM
Payer: COMMERCIAL

## 2021-09-29 ENCOUNTER — APPOINTMENT (OUTPATIENT)
Dept: GENERAL RADIOLOGY | Age: 46
End: 2021-09-29
Attending: STUDENT IN AN ORGANIZED HEALTH CARE EDUCATION/TRAINING PROGRAM
Payer: COMMERCIAL

## 2021-09-29 ENCOUNTER — HOSPITAL ENCOUNTER (EMERGENCY)
Age: 46
Discharge: HOME | End: 2021-09-29
Attending: STUDENT IN AN ORGANIZED HEALTH CARE EDUCATION/TRAINING PROGRAM
Payer: COMMERCIAL

## 2021-09-29 VITALS
HEIGHT: 64 IN | WEIGHT: 230 LBS | TEMPERATURE: 98.3 F | DIASTOLIC BLOOD PRESSURE: 88 MMHG | BODY MASS INDEX: 39.27 KG/M2 | RESPIRATION RATE: 23 BRPM | OXYGEN SATURATION: 100 % | HEART RATE: 90 BPM | SYSTOLIC BLOOD PRESSURE: 165 MMHG

## 2021-09-29 DIAGNOSIS — I10 HYPERTENSION, UNSPECIFIED TYPE: ICD-10-CM

## 2021-09-29 DIAGNOSIS — R00.2 PALPITATIONS: ICD-10-CM

## 2021-09-29 DIAGNOSIS — R07.9 ACUTE CHEST PAIN: Primary | ICD-10-CM

## 2021-09-29 LAB
ALBUMIN SERPL-MCNC: 2.3 G/DL (ref 3.4–5)
ALBUMIN/GLOB SERPL: 0.5 {RATIO} (ref 0.8–1.7)
ALP SERPL-CCNC: 81 U/L (ref 45–117)
ALT SERPL-CCNC: 34 U/L (ref 13–56)
ANION GAP SERPL CALC-SCNC: 8 MMOL/L (ref 3–18)
AST SERPL-CCNC: 22 U/L (ref 10–38)
ATRIAL RATE: 93 BPM
BASOPHILS # BLD: 0 K/UL (ref 0–0.1)
BASOPHILS NFR BLD: 0 % (ref 0–2)
BILIRUB SERPL-MCNC: 0.1 MG/DL (ref 0.2–1)
BUN SERPL-MCNC: 13 MG/DL (ref 7–18)
BUN/CREAT SERPL: 17 (ref 12–20)
CALCIUM SERPL-MCNC: 8.6 MG/DL (ref 8.5–10.1)
CALCULATED P AXIS, ECG09: 44 DEGREES
CALCULATED R AXIS, ECG10: 32 DEGREES
CALCULATED T AXIS, ECG11: -15 DEGREES
CHLORIDE SERPL-SCNC: 104 MMOL/L (ref 100–111)
CK MB CFR SERPL CALC: NORMAL % (ref 0–4)
CK MB CFR SERPL CALC: NORMAL % (ref 0–4)
CK MB SERPL-MCNC: <1 NG/ML (ref 5–25)
CK MB SERPL-MCNC: <1 NG/ML (ref 5–25)
CK SERPL-CCNC: 52 U/L (ref 26–192)
CK SERPL-CCNC: 53 U/L (ref 26–192)
CO2 SERPL-SCNC: 25 MMOL/L (ref 21–32)
CREAT SERPL-MCNC: 0.78 MG/DL (ref 0.6–1.3)
CREAT UR-MCNC: 0.7 MG/DL (ref 0.6–1.3)
DIAGNOSIS, 93000: NORMAL
DIFFERENTIAL METHOD BLD: ABNORMAL
EOSINOPHIL # BLD: 0 K/UL (ref 0–0.4)
EOSINOPHIL NFR BLD: 0 % (ref 0–5)
ERYTHROCYTE [DISTWIDTH] IN BLOOD BY AUTOMATED COUNT: 14.6 % (ref 11.6–14.5)
GLOBULIN SER CALC-MCNC: 4.4 G/DL (ref 2–4)
GLUCOSE SERPL-MCNC: 211 MG/DL (ref 74–99)
HCG SERPL QL: NEGATIVE
HCT VFR BLD AUTO: 35.5 % (ref 35–45)
HGB BLD-MCNC: 11.7 G/DL (ref 12–16)
LYMPHOCYTES # BLD: 3.7 K/UL (ref 0.9–3.6)
LYMPHOCYTES NFR BLD: 36 % (ref 21–52)
MAGNESIUM SERPL-MCNC: 2 MG/DL (ref 1.6–2.6)
MCH RBC QN AUTO: 25.5 PG (ref 24–34)
MCHC RBC AUTO-ENTMCNC: 33 G/DL (ref 31–37)
MCV RBC AUTO: 77.3 FL (ref 78–100)
MONOCYTES # BLD: 0.7 K/UL (ref 0.05–1.2)
MONOCYTES NFR BLD: 7 % (ref 3–10)
NEUTS SEG # BLD: 5.8 K/UL (ref 1.8–8)
NEUTS SEG NFR BLD: 57 % (ref 40–73)
P-R INTERVAL, ECG05: 140 MS
PLATELET # BLD AUTO: 339 K/UL (ref 135–420)
PMV BLD AUTO: 9.5 FL (ref 9.2–11.8)
POTASSIUM SERPL-SCNC: 3.4 MMOL/L (ref 3.5–5.5)
PROT SERPL-MCNC: 6.7 G/DL (ref 6.4–8.2)
Q-T INTERVAL, ECG07: 368 MS
QRS DURATION, ECG06: 86 MS
QTC CALCULATION (BEZET), ECG08: 457 MS
RBC # BLD AUTO: 4.59 M/UL (ref 4.2–5.3)
SODIUM SERPL-SCNC: 137 MMOL/L (ref 136–145)
TROPONIN I SERPL-MCNC: <0.02 NG/ML (ref 0–0.04)
TROPONIN I SERPL-MCNC: <0.02 NG/ML (ref 0–0.04)
VENTRICULAR RATE, ECG03: 93 BPM
WBC # BLD AUTO: 10.3 K/UL (ref 4.6–13.2)

## 2021-09-29 PROCEDURE — 93005 ELECTROCARDIOGRAM TRACING: CPT

## 2021-09-29 PROCEDURE — 84703 CHORIONIC GONADOTROPIN ASSAY: CPT

## 2021-09-29 PROCEDURE — 74011000636 HC RX REV CODE- 636: Performed by: STUDENT IN AN ORGANIZED HEALTH CARE EDUCATION/TRAINING PROGRAM

## 2021-09-29 PROCEDURE — 82565 ASSAY OF CREATININE: CPT

## 2021-09-29 PROCEDURE — 70498 CT ANGIOGRAPHY NECK: CPT

## 2021-09-29 PROCEDURE — 99285 EMERGENCY DEPT VISIT HI MDM: CPT

## 2021-09-29 PROCEDURE — 80053 COMPREHEN METABOLIC PANEL: CPT

## 2021-09-29 PROCEDURE — 82553 CREATINE MB FRACTION: CPT

## 2021-09-29 PROCEDURE — 71046 X-RAY EXAM CHEST 2 VIEWS: CPT

## 2021-09-29 PROCEDURE — 70450 CT HEAD/BRAIN W/O DYE: CPT

## 2021-09-29 PROCEDURE — 85025 COMPLETE CBC W/AUTO DIFF WBC: CPT

## 2021-09-29 PROCEDURE — 83735 ASSAY OF MAGNESIUM: CPT

## 2021-09-29 RX ADMIN — IOPAMIDOL 81 ML: 755 INJECTION, SOLUTION INTRAVENOUS at 20:25

## 2021-09-29 NOTE — ED PROVIDER NOTES
EMERGENCY DEPARTMENT HISTORY AND PHYSICAL EXAM      Date: (Not on file)  Patient Name: Young Nye    History of Presenting Illness     No chief complaint on file. History (Context): Young Nye is a 40 y.o. female with a past medical history significant for polycystic kidney disease, diabetes, hypertension, and ADHD Comes into the ED today due to hypertension, headache, and chest pain. Patient states that symptoms began a few days ago. Patient symptoms have worsened since that time. She states chest pain is located substernally, described as achy, radiates to both the right and left side of her chest, and without any alleviating or exacerbating factors. She also admits to palpitations during this time without any inciting factors for her symptoms. She says when she is at home and palpitations occur she puts on her pulse ox however shows a normal heart rate. She was recently seen by her PCP who found her to be significantly more hypertensive than previously and doubled the doses of her antihypertensive medication without significant improvement per patient. She otherwise states she has been feeling well without any fever, chills, abdominal pain, nausea, or vomiting. PCP: Caitlin Wesley NP    Current Outpatient Medications   Medication Sig Dispense Refill    flash glucose sensor (FreeStyle Esmer 14 Day Sensor) kit USE TO CHECK GLUCOSE THREE TIMES DAILY. CHANGE SENSOR EVERY 14 DAYS 6 Kit 3    ergocalciferol (ERGOCALCIFEROL) 1,250 mcg (50,000 unit) capsule TAKE 1 CAPSULE BY MOUTH EVERY 7 DAYS 12 Capsule 0    valsartan (DIOVAN) 160 mg tablet TAKE 1 TABLET BY MOUTH DAILY 90 Tablet 1    butalbital-acetaminophen-caffeine (FIORICET, ESGIC) -40 mg per tablet TAKE 1 TO 2 TABLETS BY MOUTH EVERY 4 TO 6 HOURS AS NEEDED FOR HEADACHE NOT TO EXCEED 6 TABLETS IN 24 HOURS 60 Tablet 2    dextroamphetamine-amphetamine (ADDERALL) 20 mg tablet Take 1 Tablet by mouth two (2) times a day.  Max Daily Amount: 40 mg. 60 Tablet 0    SITagliptin (Januvia) 100 mg tablet TAKE 1 TABLET BY MOUTH DAILY 90 Tablet 2    Insulin Needles, Disposable, (Miranda Pen Needle) 32 gauge x 5/32\" ndle USE ONCE DAILY WITH TRESIBA 100 Pen Needle 5    insulin degludec (Tresiba FlexTouch U-200) 200 unit/mL (3 mL) inpn pen INJECT 20 UNITS BENEATH THE SKIN DAILY 9 mL 5    glipiZIDE (GLUCOTROL) 10 mg tablet Take 1 Tablet by mouth two (2) times a day. 180 Tablet 1    albuterol (PROVENTIL HFA, VENTOLIN HFA, PROAIR HFA) 90 mcg/actuation inhaler Take 2 Puffs by inhalation every four (4) hours as needed for Wheezing or Shortness of Breath. 1 Inhaler 0    rosuvastatin (CRESTOR) 20 mg tablet Take 1 Tab by mouth nightly. 90 Tab 0    lancets misc Blood sugar check  Each 11    glucose blood VI test strips (ONETOUCH VERIO) strip USE TO CHECK GLUCOSE THREE TIMES DAILY 100 Strip 11    clotrimazole-betamethasone (LOTRISONE) topical cream Apply to affected area every 12 hrs x2 weeks 15 g 1    flash glucose sensor (FREESTYLE NOLAN 14 DAY SENSOR) kit Check glucose 3 times a day 1 Kit 5    flash glucose scanning reader (FREESTYLE NOLAN 14 DAY READER) misc To use with nolan sensor. Check glucose 3 times a day 2 Each 5    Blood-Glucose Meter (ONETOUCH VERIO FLEX START) monitoring kit Check glucose 3 times daily 1 Kit 1       Past History     Past Medical History:   Past Medical History:   Diagnosis Date    ADHD (attention deficit hyperactivity disorder)     [facility]     Diabetes mellitus, type II [facility] [address] [facility]     History of echocardiogram 09/02/2015    EF 60-65%. No RWMA. Mild conc LVH. No significant valvular heart disease.  History of uterine fibroid     Holter monitor, normal 09/02/2015    Sinus rhythm, avg HR 99 bpm (range  bpm). Benign Holter study.  Hyperlipidemia     LLE venous duplex 05/05/2015    Left leg:  No DVT.     Polycystic kidney disease     Psychiatric disorder        Past Surgical History:  Past Surgical History:   Procedure Laterality Date    HX BREAST BIOPSY Right     HX GYN      hysterectomy    HX HYSTERECTOMY      2007    HX ORTHOPAEDIC  2014    knee    HX WISDOM TEETH EXTRACTION         Family History:  Family History   Problem Relation Age of Onset    Diabetes Other     Heart Disease Other     Hypertension Other     Hypertension Mother     Diabetes Mother     High Cholesterol Mother     Diabetes Father     Hypertension Father     High Cholesterol Father        Social History:   Social History     Tobacco Use    Smoking status: Former Smoker     Quit date: 2000     Years since quittin.7    Smokeless tobacco: Never Used   Substance Use Topics    Alcohol use: Yes     Comment: socially    Drug use: No       Allergies: Allergies   Allergen Reactions    Lisinopril Cough    Nsaids (Non-Steroidal Anti-Inflammatory Drug) Other (comments)     Polycystic kidney disease       PMH, PSH, family history, social history, allergies reviewed with the patient with significant items noted above. Review of Systems   Review of Systems   Constitutional: Negative for chills, diaphoresis and fever. HENT: Negative for sore throat. Eyes: Negative for visual disturbance. Respiratory: Positive for shortness of breath. Cardiovascular: Positive for chest pain and palpitations. Gastrointestinal: Negative for abdominal pain, nausea and vomiting. Genitourinary: Negative for difficulty urinating. Musculoskeletal: Negative for myalgias. Skin: Negative for rash. Neurological: Negative for headaches. Physical Exam   There were no vitals filed for this visit. Physical Exam  Vitals and nursing note reviewed. Constitutional:       General: She is not in acute distress. Appearance: Normal appearance. She is not ill-appearing or toxic-appearing. HENT:      Head: Normocephalic and atraumatic.       Mouth/Throat:      Mouth: Mucous membranes are moist.   Eyes:      General: No scleral icterus. Conjunctiva/sclera: Conjunctivae normal.   Cardiovascular:      Rate and Rhythm: Normal rate and regular rhythm. Comments: Normal peripheral perfusion  Pulmonary:      Effort: Pulmonary effort is normal.      Breath sounds: Normal breath sounds. Abdominal:      General: There is no distension. Palpations: Abdomen is soft. Tenderness: There is no abdominal tenderness. Musculoskeletal:         General: No deformity. Normal range of motion. Cervical back: Normal range of motion and neck supple. Skin:     General: Skin is warm and dry. Findings: No rash. Neurological:      General: No focal deficit present. Mental Status: She is alert and oriented to person, place, and time. Mental status is at baseline. Psychiatric:         Mood and Affect: Mood normal.         Thought Content:  Thought content normal.         Diagnostic Study Results     Labs -     Recent Results (from the past 12 hour(s))   EKG, 12 LEAD, INITIAL    Collection Time: 09/29/21  6:00 PM   Result Value Ref Range    Ventricular Rate 93 BPM    Atrial Rate 93 BPM    P-R Interval 140 ms    QRS Duration 86 ms    Q-T Interval 368 ms    QTC Calculation (Bezet) 457 ms    Calculated P Axis 44 degrees    Calculated R Axis 32 degrees    Calculated T Axis -15 degrees    Diagnosis       Normal sinus rhythm  Anterior infarct , age undetermined  T wave abnormality, consider inferolateral ischemia  Abnormal ECG    Confirmed by Yoselyn Mack MD, Kristen Murray ([id number]) on 9/29/2021 6:07:40 PM        Labs Reviewed   CBC WITH AUTOMATED DIFF   METABOLIC PANEL, COMPREHENSIVE   CARDIAC PANEL,(CK, CKMB & TROPONIN)   MAGNESIUM   HCG QL SERUM       Radiologic Studies -   XR CHEST PA LAT    (Results Pending)   CT HEAD WO CONT    (Results Pending)   CTA HEAD NECK W CONT    (Results Pending)     CT Results  (Last 48 hours)    None        CXR Results  (Last 48 hours)    None          The laboratory results, imaging results, and other diagnostic exams were reviewed in the EMR. Medical Decision Making   I am the first provider for this patient. I reviewed the vital signs, available nursing notes, past medical history, past surgical history, family history and social history. Vital Signs-Reviewed the patient's vital signs. ED EKG interpretation:  Rhythm: normal sinus rhythm; and regular . Rate (approx.): 93; Axis: normal; P wave: normal; QRS interval: normal ; ST/T wave: T wave inverted; Other findings: abnormal ekg. This EKG was interpreted by Nathan Chatterjee D.O. Records Reviewed: Personally, on initial evaluation    MDM:   Mehreen Ragsdale presents with complaint of chest pain, palpitations, and other symptoms  DDX includes but is not limited to: ACS, MI, intracranial hemorrhage    Patient overall anxious but in no acute distress, and nontoxic in appearance. Patient's EKG shows inverted T waves in the inferior and lateral leads. Unable to compare to previous EKG however report does not state previous T wave inversions on chart review. Will obtain lab work and imaging for further evaluation of patients complaint. Will continue to monitor and evaluate patient while in the ED. Orders as below:  Orders Placed This Encounter    XR CHEST PA LAT    CT HEAD WO CONT    CTA HEAD NECK W CONT    CBC WITH AUTOMATED DIFF    METABOLIC PANEL, COMPREHENSIVE    CARDIAC PANEL,(CK, CKMB & TROPONIN)    MAGNESIUM    HCG QL SERUM    EKG, 12 LEAD, INITIAL        ED Course:            Procedures:  Procedures        Diagnosis and Disposition     CLINICAL IMPRESSION:  No diagnosis found. Current Discharge Medication List          Disposition: TBD      6:51 PM : Pt care transferred to Dr. Ambrosio Conn  ,ED provider. History of patient complaint(s), available diagnostic reports and current treatment plan has been discussed thoroughly. Bedside rounding on patient occured : no .   Intended disposition of patient : TBD  Pending diagnostics reports and/or labs (please list): Labs and imaging    Dr. Ovidio Edwards assistance in completion of this plan is greatly appreciated but it should be noted that I will be the provider of record for this patient. Dragon Disclaimer     Please note that this dictation was completed with [employer], the computer voice recognition software. Quite often unanticipated grammatical, syntax, homophones, and other interpretive errors are inadvertently transcribed by the computer software. Please disregard these errors. Please excuse any errors that have escaped final proofreading. Salma DAWN.

## 2021-09-29 NOTE — ED TRIAGE NOTES
Pt reports chest pain x1 week, was seen by PCP and they increased HTN meds.  Pt has continues to have pain was told to come ER if persist.

## 2021-09-30 LAB
ATRIAL RATE: 88 BPM
CALCULATED P AXIS, ECG09: 37 DEGREES
CALCULATED R AXIS, ECG10: -2 DEGREES
CALCULATED T AXIS, ECG11: 5 DEGREES
DIAGNOSIS, 93000: NORMAL
P-R INTERVAL, ECG05: 154 MS
Q-T INTERVAL, ECG07: 394 MS
QRS DURATION, ECG06: 80 MS
QTC CALCULATION (BEZET), ECG08: 476 MS
VENTRICULAR RATE, ECG03: 88 BPM

## 2021-09-30 NOTE — ED NOTES
19:00  Assumed care by Dr. Braden Swann pending CT head, CTA head and neck  CT head no acute abnormality, CTA head and neck no aneurysm normal anterior and posterior circulation as per preliminary read  Repeat EKG 88, normal sinus rhythm, no ST changes, T wave inversions in V4 to V6 no longer present  Patient has no chest pain, feels well on reassessment  2 sets of troponin negative  Patient advised to follow-up with cardiology for possible stress test and echo  Patient advised to follow-up with PMD for blood pressure control  Return precautions given    At this time, patient is stable and appropriate for discharge home. Patient demonstrates understanding of current diagnoses and is in agreement with the treatment plan. They are advised that while the likelihood of serious underlying condition is low at this point given the evaluation performed today, we cannot fully rule it out. They are advised to immediately return with any new symptoms or worsening of current condition. Any Incidental findings were noted on the patient's discharge paperwork as well as verbally directly to the patient, and the appropriate follow-up was given to the patient as far as instructions on testing needed as well as the timeframe. All questions have been answered. Patient is given educational material regarding their diagnoses, including danger symptoms and when to return to the ED.

## 2021-10-05 ENCOUNTER — OFFICE VISIT (OUTPATIENT)
Dept: FAMILY MEDICINE CLINIC | Age: 46
End: 2021-10-05
Payer: COMMERCIAL

## 2021-10-05 VITALS
RESPIRATION RATE: 20 BRPM | HEART RATE: 80 BPM | TEMPERATURE: 97.8 F | HEIGHT: 64 IN | OXYGEN SATURATION: 98 % | BODY MASS INDEX: 38.41 KG/M2 | WEIGHT: 225 LBS | SYSTOLIC BLOOD PRESSURE: 122 MMHG | DIASTOLIC BLOOD PRESSURE: 94 MMHG

## 2021-10-05 DIAGNOSIS — R07.9 CHEST PAIN, UNSPECIFIED TYPE: ICD-10-CM

## 2021-10-05 DIAGNOSIS — Z79.4 TYPE 2 DIABETES MELLITUS WITH HYPERGLYCEMIA, WITH LONG-TERM CURRENT USE OF INSULIN (HCC): ICD-10-CM

## 2021-10-05 DIAGNOSIS — E66.01 SEVERE OBESITY (BMI 35.0-35.9 WITH COMORBIDITY) (HCC): ICD-10-CM

## 2021-10-05 DIAGNOSIS — E11.65 TYPE 2 DIABETES MELLITUS WITH HYPERGLYCEMIA, WITH LONG-TERM CURRENT USE OF INSULIN (HCC): ICD-10-CM

## 2021-10-05 DIAGNOSIS — E11.9 TYPE 2 DIABETES MELLITUS WITHOUT COMPLICATION, WITHOUT LONG-TERM CURRENT USE OF INSULIN (HCC): Primary | ICD-10-CM

## 2021-10-05 LAB — HBA1C MFR BLD HPLC: 9.6 %

## 2021-10-05 PROCEDURE — 83036 HEMOGLOBIN GLYCOSYLATED A1C: CPT | Performed by: NURSE PRACTITIONER

## 2021-10-05 PROCEDURE — 99214 OFFICE O/P EST MOD 30 MIN: CPT | Performed by: NURSE PRACTITIONER

## 2021-10-05 RX ORDER — VALSARTAN 320 MG/1
320 TABLET ORAL DAILY
Qty: 90 TABLET | Refills: 2 | Status: SHIPPED | OUTPATIENT
Start: 2021-10-05 | End: 2022-06-28

## 2021-10-05 RX ORDER — ATENOLOL 25 MG/1
25 TABLET ORAL
Qty: 30 TABLET | Refills: 2 | Status: SHIPPED | OUTPATIENT
Start: 2021-10-05 | End: 2021-12-27 | Stop reason: SDUPTHER

## 2021-10-05 NOTE — PROGRESS NOTES
Subjective:    Anaya Zhang is a 40y.o. year old female seen for follow up of diabetes. She also has hypertension and hyperlipidemia. Diabetic Review of Systems - medication compliance: compliant all of the time, diabetic diet compliance: compliant most of the time, home glucose monitoring: is performed regularly, Entire glucose log was reviewed with patient at this visit. , further diabetic ROS: no polyuria or polydipsia, no chest pain, dyspnea or TIA's, no numbness, tingling or pain in extremities, last eye exam approximately 2 year ago. Other symptoms and concerns: patient was seen in the ED on 9/29/2021 for the following:   History (Context): Anaya Zhang is a 40 y.o. female with a past medical history significant for polycystic kidney disease, diabetes, hypertension, and ADHD Comes into the ED today due to hypertension, headache, and chest pain. Patient states that symptoms began a few days ago. Patient symptoms have worsened since that time. She states chest pain is located substernally, described as achy, radiates to both the right and left side of her chest, and without any alleviating or exacerbating factors. She also admits to palpitations during this time without any inciting factors for her symptoms. She says when she is at home and palpitations occur she puts on her pulse ox however shows a normal heart rate. She was recently seen by her PCP who found her to be significantly more hypertensive than previously and doubled the doses of her antihypertensive medication without significant improvement per patient. She otherwise states she has been feeling well without any fever, chills, abdominal pain, nausea, or vomiting. Patient states she continues to have daily headaches and also blood pressure is elevated. States she continues to have daily headaches. Reports she has not been taking her Adderall due to headaches however, this has not helped.   Patient was recently referred to neurology, has not been scheduled for an appt as of yet. Patient also reports she has also scheduled an appointment with her new nephrologist for Polycystic Kidney Disease. Patient Active Problem List   Diagnosis Code    Diabetes mellitus, type II ([facility] [address] E11.9    Hyperlipidemia E78.5    ADHD (attention deficit hyperactivity disorder) F90.9    Type 2 diabetes with nephropathy ([facility] [address] E11.21    Severe obesity ([facility] E66.01     Current Outpatient Medications   Medication Sig Dispense Refill    flash glucose sensor (FreeStyle Esmer 14 Day Sensor) kit USE TO CHECK GLUCOSE THREE TIMES DAILY. CHANGE SENSOR EVERY 14 DAYS 6 Kit 3    ergocalciferol (ERGOCALCIFEROL) 1,250 mcg (50,000 unit) capsule TAKE 1 CAPSULE BY MOUTH EVERY 7 DAYS 12 Capsule 0    valsartan (DIOVAN) 160 mg tablet TAKE 1 TABLET BY MOUTH DAILY 90 Tablet 1    butalbital-acetaminophen-caffeine (FIORICET, ESGIC) -40 mg per tablet TAKE 1 TO 2 TABLETS BY MOUTH EVERY 4 TO 6 HOURS AS NEEDED FOR HEADACHE NOT TO EXCEED 6 TABLETS IN 24 HOURS 60 Tablet 2    dextroamphetamine-amphetamine (ADDERALL) 20 mg tablet Take 1 Tablet by mouth two (2) times a day. Max Daily Amount: 40 mg. 60 Tablet 0    SITagliptin (Januvia) 100 mg tablet TAKE 1 TABLET BY MOUTH DAILY 90 Tablet 2    Insulin Needles, Disposable, (Miranda Pen Needle) 32 gauge x 5/32\" ndle USE ONCE DAILY WITH TRESIBA 100 Pen Needle 5    insulin degludec (Tresiba FlexTouch U-200) 200 unit/mL (3 mL) inpn pen INJECT 20 UNITS BENEATH THE SKIN DAILY 9 mL 5    glipiZIDE (GLUCOTROL) 10 mg tablet Take 1 Tablet by mouth two (2) times a day. 180 Tablet 1    albuterol (PROVENTIL HFA, VENTOLIN HFA, PROAIR HFA) 90 mcg/actuation inhaler Take 2 Puffs by inhalation every four (4) hours as needed for Wheezing or Shortness of Breath.  1 Inhaler 0    clotrimazole-betamethasone (LOTRISONE) topical cream Apply to affected area every 12 hrs x2 weeks 15 g 1    flash glucose sensor (FREESTYLE ESMER 14 DAY SENSOR) kit Check glucose 3 times a day 1 Kit 5    flash glucose scanning reader (Zecco NOLAN 14 DAY READER) misc To use with nolan sensor. Check glucose 3 times a day 2 Each 5    rosuvastatin (CRESTOR) 20 mg tablet Take 1 Tab by mouth nightly. (Patient not taking: Reported on 10/5/2021) 90 Tab 0      Allergies   Allergen Reactions    Lisinopril Cough    Nsaids (Non-Steroidal Anti-Inflammatory Drug) Other (comments)     Polycystic kidney disease     Past Surgical History:   Procedure Laterality Date    HX BREAST BIOPSY Right     HX GYN      hysterectomy    HX HYSTERECTOMY      2007    HX ORTHOPAEDIC  12/2014    knee    HX WISDOM TEETH EXTRACTION       Family History   Problem Relation Age of Onset    Diabetes Other     Heart Disease Other     Hypertension Other     Hypertension Mother     Diabetes Mother     High Cholesterol Mother     Diabetes Father     Hypertension Father     High Cholesterol Father       Lab Results   Component Value Date/Time    Cholesterol, total 165 07/05/2019 08:25 AM    HDL Cholesterol 50 07/05/2019 08:25 AM    LDL, calculated 93.8 07/05/2019 08:25 AM    VLDL, calculated 21.2 07/05/2019 08:25 AM    Triglyceride 106 07/05/2019 08:25 AM    CHOL/HDL Ratio 3.3 07/05/2019 08:25 AM     Lab Results   Component Value Date/Time    Sodium 137 09/29/2021 07:46 PM    Potassium 3.4 (L) 09/29/2021 07:46 PM    Chloride 104 09/29/2021 07:46 PM    CO2 25 09/29/2021 07:46 PM    Anion gap 8 09/29/2021 07:46 PM    Glucose 211 (H) 09/29/2021 07:46 PM    BUN 13 09/29/2021 07:46 PM    Creatinine 0.78 09/29/2021 07:46 PM    BUN/Creatinine ratio 17 09/29/2021 07:46 PM    GFR est AA >60 09/29/2021 07:46 PM    GFR est non-AA >60 09/29/2021 07:46 PM    Calcium 8.6 09/29/2021 07:46 PM    Bilirubin, total 0.1 (L) 09/29/2021 07:46 PM    Alk.  phosphatase 81 09/29/2021 07:46 PM    Protein, total 6.7 09/29/2021 07:46 PM    Albumin 2.3 (L) 09/29/2021 07:46 PM    Globulin 4.4 (H) 09/29/2021 07:46 PM A-G Ratio 0.5 (L) 09/29/2021 07:46 PM    ALT (SGPT) 34 09/29/2021 07:46 PM    AST (SGOT) 22 09/29/2021 07:46 PM     Lab Results   Component Value Date/Time    WBC 10.3 09/29/2021 07:46 PM    HGB 11.7 (L) 09/29/2021 07:46 PM    HCT 35.5 09/29/2021 07:46 PM    PLATELET 570 43/98/7950 07:46 PM    MCV 77.3 (L) 09/29/2021 07:46 PM     Lab Results   Component Value Date/Time    Hemoglobin A1c 7.8 (H) 07/05/2019 08:25 AM    Hemoglobin A1c (POC) 9.6 10/05/2021 01:57 PM     Lab Results   Component Value Date/Time    Microalbumin/Creat ratio (mg/g creat) 4,750 (H) 07/05/2019 08:25 AM    Microalbumin,urine random 418.00 (H) 07/05/2019 08:25 AM    Microalbumin urine (POC) 150 12/06/2013 12:30 PM     Wt Readings from Last 3 Encounters:   10/05/21 225 lb (102.1 kg)   09/29/21 230 lb (104.3 kg)   02/25/20 228 lb (103.4 kg)     Last Point of Care HGB A1C  Hemoglobin A1c (POC)   Date Value Ref Range Status   10/05/2021 9.6 % Final      BP Readings from Last 3 Encounters:   10/05/21 (!) 153/91   09/29/21 (!) 165/88   02/25/20 130/78     Results for orders placed or performed in visit on 10/05/21   AMB POC HEMOGLOBIN A1C   Result Value Ref Range    Hemoglobin A1c (POC) 9.6 %     Last Diabetic Foot Exam on:   Diabetic Foot and Eye Exam HM Status   Topic Date Due    Diabetic Foot Care  07/02/2020    Eye Exam  02/27/2021       Objective:  Visit Vitals  BP (!) 153/91 (BP 1 Location: Right arm, BP Patient Position: Sitting, BP Cuff Size: Adult)   Pulse 80   Temp 97.8 °F (36.6 °C) (Temporal)   Resp 20   Ht 5' 4\" (1.626 m)   Wt 225 lb (102.1 kg)   LMP 01/01/2007   SpO2 98%   BMI 38.62 kg/m²     Awake and alert in no acute distress   Neck supple without lymphadenopathy, no carotid artery bruits auscultated bilaterally. No thyromegaly   Lungs clear throughout   S1 S2 RRR without ectopy or murmur auscultated.    Extremities: no clubbing, cyanosis, peripheral edema   Abdomen - soft, nontender, nondistended, no masses or organomegaly  Integumentary: no rashes   Reviewed vital signs     Assessment/Plan:    ICD-10-CM ICD-9-CM    1. Type 2 diabetes mellitus without complication, without long-term current use of insulin ([facility])  E11.9 250.00 AMB POC HEMOGLOBIN A1C   2. Type 2 diabetes mellitus with hyperglycemia, with long-term current use of insulin ([facility])  E11.65 250.00     Z79.4 790.29      V58.67    3. Severe obesity (BMI 35.0-35.9 with comorbidity) ([facility])  E66.01 278.01     Z68.35 V85.35    4. Chest pain, unspecified type  R07.9 786.50 REFERRAL TO CARDIOLOGY     Orders Placed This Encounter    REFERRAL TO CARDIOLOGY    AMB POC HEMOGLOBIN A1C    valsartan (DIOVAN) 320 mg tablet    atenoloL (TENORMIN) 25 mg tablet        Issues reviewed with her: low cholesterol diet, weight control and daily exercise discussed. I have discussed the diagnosis with the patient and the intended plan as seen in the above orders. The patient has received an after-visit summary and questions were answered concerning future plans. I have discussed medication side effects and warnings with the patient as well. Patient agreeable with above plan and verbalizes understanding. Follow-up and Dispositions    · Return in about 4 weeks (around 11/2/2021) for migraine since beginning medication, virtual follow up.

## 2021-10-06 ENCOUNTER — TRANSCRIBE ORDER (OUTPATIENT)
Dept: SCHEDULING | Age: 46
End: 2021-10-06

## 2021-10-06 DIAGNOSIS — E11.22 TYPE 2 DIABETES MELLITUS WITH ESRD (END-STAGE RENAL DISEASE) (HCC): Primary | ICD-10-CM

## 2021-10-06 DIAGNOSIS — N18.6 TYPE 2 DIABETES MELLITUS WITH ESRD (END-STAGE RENAL DISEASE) (HCC): Primary | ICD-10-CM

## 2021-10-07 ENCOUNTER — OFFICE VISIT (OUTPATIENT)
Dept: FAMILY MEDICINE CLINIC | Age: 46
End: 2021-10-07

## 2021-10-07 DIAGNOSIS — E11.65 TYPE 2 DIABETES MELLITUS WITH HYPERGLYCEMIA, WITH LONG-TERM CURRENT USE OF INSULIN (HCC): Primary | ICD-10-CM

## 2021-10-07 DIAGNOSIS — Z79.4 TYPE 2 DIABETES MELLITUS WITH HYPERGLYCEMIA, WITH LONG-TERM CURRENT USE OF INSULIN (HCC): Primary | ICD-10-CM

## 2021-10-07 NOTE — PATIENT INSTRUCTIONS
Your Visit Summary:     Plan:  - Stop glipizide   - Continue Tresiba at 20 units once daily. You may increase dose by 2 units every 3 days if blood sugars greater than 150 in the morning   - Continue Januvia 100 mg once daily  - Plan to start Elver Glory once approved by insurance. If you start this prior to our next visit, call me so that we can review blood sugars first   - Follow up in 3 weeks   - [facility] request via email       Call me with any questions or concerns  Chito Arvizu [phone]    Check and document your blood sugar first thing in the morning (fasting), 1-2 hours after a meal, and/or before bedtime. Bring your meter/log to all future visits. Your blood sugar goals:  - Fasting (first thing in the morning)  blood sugar: 80 - 130   - 1 to 2 hours after a meal: less than 180     When you experience symptoms of low blood sugar (example: less than 70):  - Confirm low reading by checking your blood sugar.   - Then treat with 15 grams of carbohydrates (one-half cup of juice or regular soda, or 4-5 glucose tablets). - Wait 15 minutes to recheck blood sugar.   - Then eat a protein containing meal/snack to prevent another low blood sugar episode. (example: peanut butter + crackers)    Nutrition:  - When reviewing a nutrition label, focus on the serving size, total calories, fat (and type of fats), total carbohydrates, sugar (and amount of added sugar), amount of fiber (good for your digestive), and amount of protein. Refer to your nutrition label guide for more information.  - For a meal : max 45 - 60 grams of carbohydrates  - For a snack: max 15 grams of carbohydrates  - Reduce amount of saturated and trans fat. Consider more unsaturated fat options as they are better for your heart health.    - Have at least 1 serving of lean fat protein with each meal.    - Increase fiber intake slowly to prevent constipation.   - Substitute fruit juices for the whole fruit    Low carb snack ideas (15 grams total carb or less):     String cheese or babybel with 6 crackers   4 peanut butter crackers   3 cups of popcorn   1 cup raw vegetables with hummus or ranch dip (just need to watch how much dip you use)   Nuts   2 rice cakes   Celery with peanut butter or cream cheese   String cheese with 1 serving of fruit   Greek yogurt (look at label to make sure < 15 gram carb)   Plain greek yogurt with fresh berries added   Nature valley protein bar   Whisps parmesan cheese crisps   Hard boiled egg   Cottage cheese   Tuna salad lettuce roll-ups   Deli meat roll-ups with slice of cheese   Sugar Free Jello   Glucerna shake (16 grams)    Glucerna hunger smart shake (16 grams)   Ensure protein max shake   Fruit (1 serving/15 grams)   1/2 banana, elsa, or grapefruit    1/3 melon (small cantaloupe)   1 slice or 1 cup of honeydew melon   1 slice or 1 and 1/4 cups of watermelon    1 small apple, peach, orange or pear   2 small tangerines   1 cup of raspberries   3/4 cup of blackberries, blueberries or pineapples   1/2 cup of fruit juice, pears, applesauce, or mangos   17 small grapes   12 cherries    Be careful with the glucerna products as they differ in the total carbs depending on the product (some are intended as meal replacements not snacks). Make sure you look at the total carbs on the label as products can differ. Physical Activity:  - Aim for 30 minutes of consistent, moderately intensive, physical activity a day for 5 days or an average of 150 minutes per week. - Start slow, increase as tolerated. For example: Walk every day, working up to 30 minutes of brisk walking, 5 days a weekor split the 30 minutes into two 15-minute or three 10-minute walks. - If you sit for a long time, get up and move/stretch every 90 minutes. Other recommendations:  - Schedule an annual eye exam.  - Check your feet daily for any signs of open wounds, cuts, or sores.     - Given your risk factors, the following vaccines are recommended: annual flu shot, age-based pneumococcal vaccines (Pneumovax, Prevnar 13). In addition to taking your medications as directed, improving your blood sugar involves modifying your nutrition and maximizing the amount of physical activity.

## 2021-10-08 NOTE — PROGRESS NOTES
Pharmacy Progress Note - Diabetes Management    Assessment / Plan:   Diabetes Management:  - Per ADA guidelines, Pt's A1c is not at goal of < 7%. - Current SMBG(s)/CGM trend is at goals majority of the time per review of Freestyle Esmer today, with several readings below 70 due to her new change in diet   - Patient is planning to start Coletta Grounds per the recommendation of her nephrologist. Therefore, we decided to stop the glipizide altogether to reduce the risk of hypoglycemia   - Patient will continue on Tresiba 20 units once daily, with instructions to increase dose by 2 units every 3 days if fasting BG is above 150  - She will also continue with Januvia   - Informed patient to expect an increase in readings with stopping glipizide, especially until we start the Coletta Grounds. However, we need to fix the low blood sugars first and then we will address the high readings. She expressed understanding.   - Follow up in office in 3 weeks to review     Nutrition/Lifestyle Modifications:  - Did not discuss in detail today since patient is starting a new medical weight loss program   - We will need to be very careful with her medications given she has significantly  reduced the amount of carbs that she is eating throughout the day and is intermittently fasting  - Will discuss more at later visits as needed         S/O: Ms. Tyrel Monreal is a 40 y.o. female, referred by Dr. Toni Bennett, NP was seen today for diabetes management visit. Patient's last A1c was 9.6% in October 2021. Brief History: Patient has had diabetes for at least 10 years. She states that her control has wavered over the years, and recently she has been unable to get readings stable as she has seen a lot of highs and lows. She just started a new medically managed weight loss program where she is following a strict low carb diet.  Because of this lifestyle change, she has seen some of her blood sugars drop below 70, especially overnight which has been concerning for her. She is planning to start [address] per the recommendation of her nephrologist, so she presents today to develop a plan moving forward to help stabilize her readings given recent medication and diet changes. Current anti-hyperglycemic regimen include(s):    - Tresiba 20 units once dialy  - Januvia 100 mg once daily  - Glipizide 10 mg twice daily     Patient reports adherence to her medication regimen. Of note, she has not been able to tolerate several different GLP-1 agonists in the past.       ROS:  Today, Pt endorses:  - Symptoms of Hyperglycemia: none  - Symptoms of Hypoglycemia: jitteriness and dizziness    Self Monitoring Blood Glucose (SMBG) or CGM:  - Brought in home glucometer/blood glucose log/CGM reader today:  yes  - Patient uses [employer] CGM  - Per review of device today, most readings were below 140, with a TIR of 80%  - She does have several low readings below 70, but none less than 50  - This morning, she states that she woke up and her blood sugar was 66  - Her BG prior to the appointment today was 80  - The highest reading has been 169 recently     Nutrition/Lifestyle Modifications:  - Patient recently started new diet for weight loss this past Monday (1200 calorie diet)  - The plan consists of 1 shake for breakfast and lunch, 2 snacks during the day, and 1 low carb/low calorie meal  - She also intermittent fasting, and her window to eat is between 7 am and 7 pm  - She states that she is already down 5 lbs since Monday       Past Medical History:   Diagnosis Date    ADHD (attention deficit hyperactivity disorder)     Diabetes [facility]     Diabetes mellitus, type II ([facility]     History of echocardiogram 09/02/2015    EF 60-65%. No RWMA. Mild conc LVH. No significant valvular heart disease.  History of uterine fibroid     Holter monitor, normal 09/02/2015    Sinus rhythm, avg HR 99 bpm (range  bpm). Benign Holter study.     Hyperlipidemia     LLE venous duplex 05/05/2015    Left leg:  No DVT.  Polycystic kidney disease     Psychiatric disorder      Allergies   Allergen Reactions    Lisinopril Cough    Nsaids (Non-Steroidal Anti-Inflammatory Drug) Other (comments)     Polycystic kidney disease       Current Outpatient Medications   Medication Sig    valsartan (DIOVAN) 320 mg tablet Take 1 Tablet by mouth daily.  atenoloL (TENORMIN) 25 mg tablet Take 1 Tablet by mouth nightly.  flash glucose sensor (FreeStyle Esmer 14 Day Sensor) kit USE TO CHECK GLUCOSE THREE TIMES DAILY. CHANGE SENSOR EVERY 14 DAYS    ergocalciferol (ERGOCALCIFEROL) 1,250 mcg (50,000 unit) capsule TAKE 1 CAPSULE BY MOUTH EVERY 7 DAYS    butalbital-acetaminophen-caffeine (FIORICET, ESGIC) -40 mg per tablet TAKE 1 TO 2 TABLETS BY MOUTH EVERY 4 TO 6 HOURS AS NEEDED FOR HEADACHE NOT TO EXCEED 6 TABLETS IN 24 HOURS    dextroamphetamine-amphetamine (ADDERALL) 20 mg tablet Take 1 Tablet by mouth two (2) times a day. Max Daily Amount: 40 mg.    SITagliptin (Januvia) 100 mg tablet TAKE 1 TABLET BY MOUTH DAILY    Insulin Needles, Disposable, (Miranda Pen Needle) 32 gauge x 5/32\" ndle USE ONCE DAILY WITH TRESIBA    insulin degludec (Tresiba FlexTouch U-200) 200 unit/mL (3 mL) inpn pen INJECT 20 UNITS BENEATH THE SKIN DAILY    albuterol (PROVENTIL HFA, VENTOLIN HFA, PROAIR HFA) 90 mcg/actuation inhaler Take 2 Puffs by inhalation every four (4) hours as needed for Wheezing or Shortness of Breath.  rosuvastatin (CRESTOR) 20 mg tablet Take 1 Tab by mouth nightly. (Patient not taking: Reported on 10/5/2021)    clotrimazole-betamethasone (LOTRISONE) topical cream Apply to affected area every 12 hrs x2 weeks    flash glucose sensor (FREESTYLE ESMER 14 DAY SENSOR) kit Check glucose 3 times a day    flash glucose scanning reader (FREESTYLE ESMER 14 DAY READER) misc To use with esmer sensor. Check glucose 3 times a day     No current facility-administered medications for this visit. Lab Results   Component Value Date/Time    Sodium 137 09/29/2021 07:46 PM    Potassium 3.4 (L) 09/29/2021 07:46 PM    Chloride 104 09/29/2021 07:46 PM    CO2 25 09/29/2021 07:46 PM    Anion gap 8 09/29/2021 07:46 PM    Glucose 211 (H) 09/29/2021 07:46 PM    BUN 13 09/29/2021 07:46 PM    Creatinine 0.78 09/29/2021 07:46 PM    BUN/Creatinine ratio 17 09/29/2021 07:46 PM    GFR est AA >60 09/29/2021 07:46 PM    GFR est non-AA >60 09/29/2021 07:46 PM    Calcium 8.6 09/29/2021 07:46 PM    Bilirubin, total 0.1 (L) 09/29/2021 07:46 PM    Alk. phosphatase 81 09/29/2021 07:46 PM    Protein, total 6.7 09/29/2021 07:46 PM    Albumin 2.3 (L) 09/29/2021 07:46 PM    Globulin 4.4 (H) 09/29/2021 07:46 PM    A-G Ratio 0.5 (L) 09/29/2021 07:46 PM    ALT (SGPT) 34 09/29/2021 07:46 PM       Lab Results   Component Value Date/Time    Cholesterol, total 165 07/05/2019 08:25 AM    HDL Cholesterol 50 07/05/2019 08:25 AM    LDL, calculated 93.8 07/05/2019 08:25 AM    VLDL, calculated 21.2 07/05/2019 08:25 AM    Triglyceride 106 07/05/2019 08:25 AM    CHOL/HDL Ratio 3.3 07/05/2019 08:25 AM       Lab Results   Component Value Date/Time    WBC 10.3 09/29/2021 07:46 PM    HGB 11.7 (L) 09/29/2021 07:46 PM    HCT 35.5 09/29/2021 07:46 PM    PLATELET 907 77/14/9277 07:46 PM    MCV 77.3 (L) 09/29/2021 07:46 PM       Lab Results   Component Value Date/Time    Microalbumin/Creat ratio (mg/g creat) 4,750 (H) 07/05/2019 08:25 AM    Microalbumin,urine random 418.00 (H) 07/05/2019 08:25 AM    Microalbumin urine (POC) 150 12/06/2013 12:30 PM       HbA1c:  Lab Results   Component Value Date/Time    Hemoglobin A1c 7.8 (H) 07/05/2019 08:25 AM    Hemoglobin A1c (POC) 9.6 10/05/2021 01:57 PM     No components found for: 2     Last Point of Care HGB A1C  Hemoglobin A1c (POC)   Date Value Ref Range Status   10/05/2021 9.6 % Final        CrCl cannot be calculated (Unknown ideal weight. ).       Medication reconciliation was completed during the visit. Medications Discontinued During This Encounter   Medication Reason    glipiZIDE (GLUCOTROL) 10 mg tablet Therapy Completed       Patient verbalized understanding of the information presented and all of the patients questions were answered. AVS was handed to the patient. Patient advised to call the office with any additional questions or concerns. Notifications of recommendations will be sent to Dr. Ankush Moreno, NP for review. Patient will return to clinic in 3 week(s) for follow up. Thank you,  rDu Harris. YAZAN Hemphill            For Pharmacy Admin Tracking Only     CPA in place:  Yes   Recommendation Provided To: Patient/Caregiver: 2 via In person   Intervention Detail: Discontinued Rx: 1, reason: Therapy De-escalation and Dose Adjustment: 1, reason: Therapy Optimization   Gap Closed?: No   Intervention Accepted By: Patient/Caregiver: 2   Time Spent (min): 30

## 2021-10-11 NOTE — PROGRESS NOTES
Jessica Lopez (1975) initiated an asynchronous digital communication through [employer]. HPI: per patient questionnaire     Exam: not applicable    Diagnoses and all orders for this visit:  Diagnoses and all orders for this visit:    1. Other migraine without status migrainosus, intractable  -     REFERRAL TO NEUROLOGY  -     rizatriptan (MAXALT) 10 mg tablet; Take 1 Tablet by mouth once as needed for Migraine (may repeat 1 tab after 2 hours if significant relief is not attained) for up to 1 dose. May repeat in 2 hours if needed           Time: EV1 - 5-10 minutes were spent on the digital evaluation and management of this patient.       Danielle Soriano NP

## 2021-10-15 ENCOUNTER — TELEPHONE (OUTPATIENT)
Dept: FAMILY MEDICINE CLINIC | Age: 46
End: 2021-10-15

## 2021-10-16 ENCOUNTER — HOSPITAL ENCOUNTER (OUTPATIENT)
Dept: ULTRASOUND IMAGING | Age: 46
Discharge: HOME | End: 2021-10-16
Attending: INTERNAL MEDICINE

## 2021-10-16 DIAGNOSIS — N18.6 TYPE 2 DIABETES MELLITUS WITH ESRD (END-STAGE RENAL DISEASE) (HCC): ICD-10-CM

## 2021-10-16 DIAGNOSIS — E11.22 TYPE 2 DIABETES MELLITUS WITH ESRD (END-STAGE RENAL DISEASE) (HCC): ICD-10-CM

## 2021-10-17 NOTE — TELEPHONE ENCOUNTER
Pharmacy Progress Note - Telephone Encounter    S/O: Ms. Annabel Clayton 40 y.o. female contacted office/me via an inbound telephone call to discuss diabetes management today. Verified patients identifiers (name & ) per HIPAA policy.     - Patient states that her blood sugars have been all over the past over the past week  - Since stopping the glipizide, her blood sugars have been increasing into the 200s at night and into the mornings   - She increased her Tresiba to 24 units and she did also decide to take 1 tablet of glipizide one night to help with her blood sugars, but it dropped her to 50 the following morning   - She then tried to cut the dose back to 1/2 tablet, but her blood sugar was still 74 this morning   - She states that she did hear from her nephrologist's office that the Kelly Chaumaker is not covered by her insurance. However, they wanted her to  a sample from their office to get started on the medication anyway while they try to resubmit the PA  - Patient states that her \"Time in Target\" over the past week is 88%    A/P:  - Patient's \"Time in Target\" range is at goal over the past week. However, we are still struggling to find balance with her blood sugars  - Instructed patient to stop glipizide    - Patient to add Kelly Haymaker to her regimen per nephrologist recommendation. She is picking up the medication today. - Instructed patient to ask nephrologist's office about trying [employer], as her insurance company may cover that option. She states that she will do this. - If unable to add SGLT2i long term, would try adding back low dose extended release glipizide to assist with blood sugars control (avoid taking at night)  - Continue with follow up in 2 weeks   - Patient endorses understanding to the provided information. All questions answered at this time. There are no discontinued medications. No orders of the defined types were placed in this encounter. Thank you,  Margareth San. Connie Becker, BCPS      For Pharmacy Admin Tracking Only     CPA in place:  Yes   Recommendation Provided To: Patient/Caregiver: 0 via Telephone   Time Spent (min): 15

## 2021-10-21 ENCOUNTER — TELEPHONE (OUTPATIENT)
Dept: FAMILY MEDICINE CLINIC | Age: 46
End: 2021-10-21

## 2021-10-21 DIAGNOSIS — E11.65 TYPE 2 DIABETES MELLITUS WITH HYPERGLYCEMIA, WITH LONG-TERM CURRENT USE OF INSULIN (HCC): Primary | ICD-10-CM

## 2021-10-21 DIAGNOSIS — Z79.4 TYPE 2 DIABETES MELLITUS WITH HYPERGLYCEMIA, WITH LONG-TERM CURRENT USE OF INSULIN (HCC): Primary | ICD-10-CM

## 2021-10-21 RX ORDER — PEN NEEDLE, DIABETIC 31 GX3/16"
NEEDLE, DISPOSABLE MISCELLANEOUS
Qty: 100 PEN NEEDLE | Refills: 5 | Status: SHIPPED | OUTPATIENT
Start: 2021-10-21

## 2021-10-21 RX ORDER — INSULIN GLARGINE 100 [IU]/ML
16 INJECTION, SOLUTION SUBCUTANEOUS DAILY
Qty: 5 PEN | Refills: 1 | Status: SHIPPED | OUTPATIENT
Start: 2021-10-21 | End: 2022-04-18 | Stop reason: SDUPTHER

## 2021-10-22 NOTE — TELEPHONE ENCOUNTER
Pharmacy Progress Note - Telephone Encounter    S/O: Ms. Robyn Benson 40 y.o. female, referred by Dr. Bernard Salcido, KYLE, was contacted via an outbound telephone call to discuss diabetes management today. Verified patients identifiers (name & ) per HIPAA policy.     - Patient stated that the cost of her Jessie Mess keeps increasing and has now gotten up to $122/pen  - Discussed issue with insurance company and pharmacy and they stated that Jessie Mess is a Tier 3 medication for patient which requires her to pay 60% of the cost. Therefore, as Tresiba's johnson has increased, so has patient's copay. Insurance company prefers Levemir or Basaglar.   - Confirmed that the pharmacy has been running the medication through her insurance and co-pay card, and they stated that it still has not brought down the cost of the medication to a more affordable price     A/P:  - Discussed findings with patient and she is in agreement to switch to [facility] send in Jose Embs 16 units once daily. Patient can increase up to 20 units if needed based on blood sugars. Instructed patient to download co-pay card online if needed. - Keep follow up next week to review CGM  - Patient endorses understanding to the provided information. All questions answered at this time. Medications Discontinued During This Encounter   Medication Reason    insulin degludec Osmin Bean FlexTouch U-200) 200 unit/mL (3 mL) inpn pen Cost of Medication    Insulin Needles, Disposable, (Miranda Pen Needle) 32 gauge x 5\" ndle REORDER     Orders Placed This Encounter    insulin glargine (Basaglar KwikPen U-100 Insulin) 100 unit/mL (3 mL) inpn     Si Units by SubCUTAneous route daily. Dispense:  5 Pen     Refill:  1    Insulin Needles, Disposable, (Miranda Pen Needle) 32 gauge x 532\" ndle     Sig: USE ONCE DAILY WITH BASAGLAR     Dispense:  100 Pen Needle     Refill:  5         Thank you,  Marie Mora, [facility] Tracking Only     CPA in place:  Yes   Recommendation Provided To: Patient/Caregiver: 3 via Telephone   Intervention Detail: Discontinued Rx: 1, reason: Cost/Formulary Change, New Rx: 1, reason: Cost/Formulary Change and Refill(s) Provided   Gap Closed?: No   Intervention Accepted By: Patient/Caregiver: 3   Time Spent (min): 45

## 2021-10-28 ENCOUNTER — TELEPHONE (OUTPATIENT)
Dept: FAMILY MEDICINE CLINIC | Age: 46
End: 2021-10-28

## 2021-10-28 NOTE — TELEPHONE ENCOUNTER
Pharmacy Progress Note - Telephone Encounter    S/O: Ms. Luisito Dow 40 y.o. female, referred by Dr. Amparo Rubio NP, was contacted via an outbound telephone call to discuss diabetes management today. - Patient needed to reschedule appointment today  - However, she states that she was able to  the new insulin and is doing well on her current regimen   - Her CGM \"Time in Target\" is 97% and she hasn't seen any high or low blood sugars     A/P:  - Continue current plan  - Will see patient in office next week to review CGM readings   - Patient endorses understanding to the provided information. All questions answered at this time. Thank you,  Justice Beaver. YAZAN Rodriguez        For Pharmacy Admin Tracking Only     CPA in place:  Yes   Recommendation Provided To: Patient/Caregiver: 1 via Telephone   Intervention Detail: Scheduled Appointment   Gap Closed?: No   Intervention Accepted By: Patient/Caregiver: 1   Time Spent (min): 10

## 2021-10-31 ENCOUNTER — PATIENT MESSAGE (OUTPATIENT)
Dept: FAMILY MEDICINE CLINIC | Age: 46
End: 2021-10-31

## 2021-11-03 DIAGNOSIS — E11.65 TYPE 2 DIABETES MELLITUS WITH HYPERGLYCEMIA, WITH LONG-TERM CURRENT USE OF INSULIN (HCC): Primary | ICD-10-CM

## 2021-11-03 DIAGNOSIS — Z79.4 TYPE 2 DIABETES MELLITUS WITH HYPERGLYCEMIA, WITH LONG-TERM CURRENT USE OF INSULIN (HCC): Primary | ICD-10-CM

## 2021-11-03 RX ORDER — FLASH GLUCOSE SENSOR
KIT MISCELLANEOUS
Qty: 2 KIT | Refills: 5 | Status: SHIPPED | OUTPATIENT
Start: 2021-11-03 | End: 2022-03-15

## 2021-11-04 ENCOUNTER — OFFICE VISIT (OUTPATIENT)
Dept: FAMILY MEDICINE CLINIC | Age: 46
End: 2021-11-04

## 2021-11-04 DIAGNOSIS — E11.65 TYPE 2 DIABETES MELLITUS WITH HYPERGLYCEMIA, WITH LONG-TERM CURRENT USE OF INSULIN (HCC): Primary | ICD-10-CM

## 2021-11-04 DIAGNOSIS — Z79.4 TYPE 2 DIABETES MELLITUS WITH HYPERGLYCEMIA, WITH LONG-TERM CURRENT USE OF INSULIN (HCC): Primary | ICD-10-CM

## 2021-11-04 NOTE — PATIENT INSTRUCTIONS
Your Visit Summary:  
 
Plan: 
- Continue current medications. Keep up the great work! 
- Continue to monitor for any low blood sugars and notify office if this increases again - I will check in on your blood sugars periodically and reach out as needed - I will let you know the plan for Damon Cason once discussed with nephrologist office Call me with any questions or concerns Adarsh Colby [phone] Check and document your blood sugar first thing in the morning (fasting), 1-2 hours after a meal, and/or before bedtime. Bring your meter/log to all future visits. Your blood sugar goals: 
- Fasting (first thing in the morning)  blood sugar: 80 - 130  
- 1 to 2 hours after a meal: less than 180 When you experience symptoms of low blood sugar (example: less than 70): - Confirm low reading by checking your blood sugar.  
- Then treat with 15 grams of carbohydrates (one-half cup of juice or regular soda, or 4-5 glucose tablets). - Wait 15 minutes to recheck blood sugar.  
- Then eat a protein containing meal/snack to prevent another low blood sugar episode. (example: peanut butter + crackers) Nutrition: 
- When reviewing a nutrition label, focus on the serving size, total calories, fat (and type of fats), total carbohydrates, sugar (and amount of added sugar), amount of fiber (good for your digestive), and amount of protein. Refer to your nutrition label guide for more information. 
- For a meal : max 45 - 60 grams of carbohydrates - For a snack: max 15 grams of carbohydrates - Reduce amount of saturated and trans fat. Consider more unsaturated fat options as they are better for your heart health. - Have at least 1 serving of lean fat protein with each meal.   
- Increase fiber intake slowly to prevent constipation.  
- Substitute fruit juices for the whole fruit Low carb snack ideas (15 grams total carb or less): 
 
 String cheese or babybel with 6 crackers  4 peanut butter crackers  3 cups of popcorn  1 cup raw vegetables with hummus or ranch dip (just need to watch how much dip you use)  Nuts  2 rice cakes  Celery with peanut butter or cream cheese  String cheese with 1 serving of fruit  Greek yogurt (look at label to make sure < 15 gram carb)  Plain greek yogurt with fresh berries added [address] protein bar  Whisps parmesan cheese crisps  Hard boiled egg [address]  Tuna salad lettuce roll-ups  Deli meat roll-ups with slice of cheese  Sugar Free Jello  Glucerna shake (16 grams)  Glucerna hunger smart shake (16 grams)  Ensure protein max shake  Fruit (1 serving/15 grams)  1/2 banana, elsa, or grapefruit  1/3 melon (small cantaloupe)  1 slice or 1 cup of honeydew melon  1 slice or 1 and 1/4 cups of watermelon  1 small apple, peach, orange or pear  2 small tangerines  1 cup of raspberries  3/4 cup of blackberries, blueberries or pineapples  1/2 cup of fruit juice, pears, applesauce, or mangos  17 small grapes  12 cherries Be careful with the glucerna products as they differ in the total carbs depending on the product (some are intended as meal replacements not snacks). Make sure you look at the total carbs on the label as products can differ. Physical Activity: - Aim for 30 minutes of consistent, moderately intensive, physical activity a day for 5 days or an average of 150 minutes per week. - Start slow, increase as tolerated. For example: Walk every day, working up to 30 minutes of brisk walking, 5 days a weekor split the 30 minutes into two 15-minute or three 10-minute walks. - If you sit for a long time, get up and move/stretch every 90 minutes. Other recommendations: - Schedule an annual eye exam. 
- Check your feet daily for any signs of open wounds, cuts, or sores.    
- Given your risk factors, the following vaccines are recommended: annual flu shot, age-based pneumococcal vaccines (Pneumovax, Prevnar 13). In addition to taking your medications as directed, improving your blood sugar involves modifying your nutrition and maximizing the amount of physical activity.

## 2021-11-04 NOTE — PROGRESS NOTES
Pharmacy Progress Note - Diabetes Management    Assessment / Plan:   Diabetes Management:  - Per ADA guidelines, Pt's A1c is not at goal of < 7%. - Current SMBG(s)/CGM trend is much improved and at goals consistently per review of CGM today   - Will continue current medications at this time  - Hoping patient will be able to continue with [place]. Will contact nephrologist's office to see if assistance needed with PA or if insurance will cover Jardiance. - Will follow up with patient as needed. Felix Cross is connected to office panel for review. S/O: Ms. Brittany Mills is a 40 y.o. female, referred by Dr. Dino Castorena, NP was seen today for diabetes management follow up. Patient's last A1c was 9.6% in 2021. Current anti-hyperglycemic regimen include(s):    - Basaglar 20 units once daily  - Farxiga once daily (need to clarify dose; getting samples from nephrologist office for now. PA denied. )  - Januvia 100 mg once daily    Patient reports adherence to her medication regimen. She states that Anatoliy Ellison does sting a little when she gives the injection and the device is harder to use than [place], but she is okay to continue with it since it is much cheaper.        ROS:  Today, Pt endorses:  - Symptoms of Hyperglycemia: none  - Symptoms of Hypoglycemia: none    Self Monitoring Blood Glucose (SMBG) or CGM:  - Brought in home glucometer/blood glucose log/CGM reader today:  yes  - Conducts/scans: Using Freestyle Esmer CGM   - 7 day av; TIR: 95%  - 14 day av; TIR: 97%  - 30 day av; TIR: 92%  - Patient does endorse a few reading below 70 overnight     Nutrition/Lifestyle Modifications:  - Patient states that she is still doing well with her medically managed weight loss program  - She is 15 pounds down over the past month     Past Medical History:   Diagnosis Date    ADHD (attention deficit hyperactivity disorder)     Diabetes [facility]     Diabetes mellitus, type II ([facility]  History of echocardiogram 09/02/2015    EF 60-65%. No RWMA. Mild conc LVH. No significant valvular heart disease.  History of uterine fibroid     Holter monitor, normal 09/02/2015    Sinus rhythm, avg HR 99 bpm (range  bpm). Benign Holter study.  Hyperlipidemia     LLE venous duplex 05/05/2015    Left leg:  No DVT.  Polycystic kidney disease     Psychiatric disorder      Allergies   Allergen Reactions    Lisinopril Cough    Nsaids (Non-Steroidal Anti-Inflammatory Drug) Other (comments)     Polycystic kidney disease       Current Outpatient Medications   Medication Sig    flash glucose sensor (FreeStyle Esmer 2 Sensor) kit Check glucose 3 times per day    insulin glargine (Basaglar KwikPen U-100 Insulin) 100 unit/mL (3 mL) inpn 16 Units by SubCUTAneous route daily.  Insulin Needles, Disposable, (Miranda Pen Needle) 32 gauge x 5/32\" ndle USE ONCE DAILY WITH BASAGLAR    valsartan (DIOVAN) 320 mg tablet Take 1 Tablet by mouth daily.  atenoloL (TENORMIN) 25 mg tablet Take 1 Tablet by mouth nightly.  flash glucose sensor (FreeStyle Esmer 14 Day Sensor) kit USE TO CHECK GLUCOSE THREE TIMES DAILY. CHANGE SENSOR EVERY 14 DAYS    ergocalciferol (ERGOCALCIFEROL) 1,250 mcg (50,000 unit) capsule TAKE 1 CAPSULE BY MOUTH EVERY 7 DAYS    butalbital-acetaminophen-caffeine (FIORICET, ESGIC) -40 mg per tablet TAKE 1 TO 2 TABLETS BY MOUTH EVERY 4 TO 6 HOURS AS NEEDED FOR HEADACHE NOT TO EXCEED 6 TABLETS IN 24 HOURS    dextroamphetamine-amphetamine (ADDERALL) 20 mg tablet Take 1 Tablet by mouth two (2) times a day. Max Daily Amount: 40 mg.    SITagliptin (Januvia) 100 mg tablet TAKE 1 TABLET BY MOUTH DAILY    albuterol (PROVENTIL HFA, VENTOLIN HFA, PROAIR HFA) 90 mcg/actuation inhaler Take 2 Puffs by inhalation every four (4) hours as needed for Wheezing or Shortness of Breath.  rosuvastatin (CRESTOR) 20 mg tablet Take 1 Tab by mouth nightly.  (Patient not taking: Reported on 10/5/2021)    clotrimazole-betamethasone (LOTRISONE) topical cream Apply to affected area every 12 hrs x2 weeks    flash glucose sensor (FREESTYLE NOLAN 14 DAY SENSOR) kit Check glucose 3 times a day    flash glucose scanning reader (FREESTYLE NOLAN 14 DAY READER) misc To use with nolan sensor. Check glucose 3 times a day     No current facility-administered medications for this visit. Lab Results   Component Value Date/Time    Sodium 137 09/29/2021 07:46 PM    Potassium 3.4 (L) 09/29/2021 07:46 PM    Chloride 104 09/29/2021 07:46 PM    CO2 25 09/29/2021 07:46 PM    Anion gap 8 09/29/2021 07:46 PM    Glucose 211 (H) 09/29/2021 07:46 PM    BUN 13 09/29/2021 07:46 PM    Creatinine 0.78 09/29/2021 07:46 PM    BUN/Creatinine ratio 17 09/29/2021 07:46 PM    GFR est AA >60 09/29/2021 07:46 PM    GFR est non-AA >60 09/29/2021 07:46 PM    Calcium 8.6 09/29/2021 07:46 PM    Bilirubin, total 0.1 (L) 09/29/2021 07:46 PM    Alk.  phosphatase 81 09/29/2021 07:46 PM    Protein, total 6.7 09/29/2021 07:46 PM    Albumin 2.3 (L) 09/29/2021 07:46 PM    Globulin 4.4 (H) 09/29/2021 07:46 PM    A-G Ratio 0.5 (L) 09/29/2021 07:46 PM    ALT (SGPT) 34 09/29/2021 07:46 PM       Lab Results   Component Value Date/Time    Cholesterol, total 165 07/05/2019 08:25 AM    HDL Cholesterol 50 07/05/2019 08:25 AM    LDL, calculated 93.8 07/05/2019 08:25 AM    VLDL, calculated 21.2 07/05/2019 08:25 AM    Triglyceride 106 07/05/2019 08:25 AM    CHOL/HDL Ratio 3.3 07/05/2019 08:25 AM       Lab Results   Component Value Date/Time    WBC 10.3 09/29/2021 07:46 PM    HGB 11.7 (L) 09/29/2021 07:46 PM    HCT 35.5 09/29/2021 07:46 PM    PLATELET 084 37/82/6073 07:46 PM    MCV 77.3 (L) 09/29/2021 07:46 PM       Lab Results   Component Value Date/Time    Microalbumin/Creat ratio (mg/g creat) 4,750 (H) 07/05/2019 08:25 AM    Microalbumin,urine random 418.00 (H) 07/05/2019 08:25 AM    Microalbumin urine (POC) 150 12/06/2013 12:30 PM       HbA1c:  Lab Results   Component Value Date/Time    Hemoglobin A1c 7.8 (H) 07/05/2019 08:25 AM    Hemoglobin A1c (POC) 9.6 10/05/2021 01:57 PM     No components found for: 2     Last Point of Care HGB A1C  Hemoglobin A1c (POC)   Date Value Ref Range Status   10/05/2021 9.6 % Final        CrCl cannot be calculated (Unknown ideal weight. ). Medication reconciliation was completed during the visit. There are no discontinued medications. Patient verbalized understanding of the information presented and all of the patients questions were answered. AVS was handed to the patient. Patient advised to call the office with any additional questions or concerns. Thank you,  Rogelio Meyer. YAZAN Patel          For Pharmacy Admin Tracking Only     CPA in place:  Yes   Recommendation Provided To: Patient/Caregiver: 0 via In person   Time Spent (min): 45

## 2021-11-18 ENCOUNTER — TELEPHONE (OUTPATIENT)
Dept: FAMILY MEDICINE CLINIC | Age: 46
End: 2021-11-18

## 2021-11-23 NOTE — TELEPHONE ENCOUNTER
Spoke with patient and her nephrologist office. Decision still pending whether she will continue with Farxiga samples or try to see if Theresa Serrato will be covered by her insurance. Plan to assist as needed. Thank you,  Pippa Dee. Dorina Ruffin, [facility]      For Pharmacy Admin Tracking Only     CPA in place:  Yes   Recommendation Provided To: Provider: 0 via Called provider office  and Patient/Caregiver: 0 via Telephone   Time Spent (min): 30

## 2021-11-30 NOTE — PROGRESS NOTES
Patient prescribed farxiga(given samples) by nephrologist however, medication was not covered by insurance. Informed will need to try alternative SGLP2 inhibitor.

## 2021-12-16 RX ORDER — ROSUVASTATIN CALCIUM 20 MG/1
20 TABLET, COATED ORAL
Qty: 90 TABLET | Refills: 3 | Status: SHIPPED | OUTPATIENT
Start: 2021-12-16

## 2021-12-18 DIAGNOSIS — E11.65 TYPE 2 DIABETES MELLITUS WITH HYPERGLYCEMIA, WITH LONG-TERM CURRENT USE OF INSULIN (HCC): Primary | ICD-10-CM

## 2021-12-18 DIAGNOSIS — Z79.4 TYPE 2 DIABETES MELLITUS WITH HYPERGLYCEMIA, WITH LONG-TERM CURRENT USE OF INSULIN (HCC): Primary | ICD-10-CM

## 2022-01-05 ENCOUNTER — VIRTUAL VISIT (OUTPATIENT)
Dept: FAMILY MEDICINE CLINIC | Age: 47
End: 2022-01-05
Payer: COMMERCIAL

## 2022-01-05 DIAGNOSIS — E66.01 SEVERE OBESITY (BMI 35.0-35.9 WITH COMORBIDITY) (HCC): ICD-10-CM

## 2022-01-05 DIAGNOSIS — U07.1 COVID-19: Primary | ICD-10-CM

## 2022-01-05 PROBLEM — E11.21 TYPE 2 DIABETES WITH NEPHROPATHY (HCC): Status: RESOLVED | Noted: 2018-05-02 | Resolved: 2022-01-05

## 2022-01-05 PROCEDURE — 99212 OFFICE O/P EST SF 10 MIN: CPT | Performed by: NURSE PRACTITIONER

## 2022-01-05 RX ORDER — ALBUTEROL SULFATE 90 UG/1
2 AEROSOL, METERED RESPIRATORY (INHALATION)
Qty: 1 EACH | Refills: 0 | Status: SHIPPED | OUTPATIENT
Start: 2022-01-05 | End: 2022-06-14

## 2022-01-05 RX ORDER — LEVOFLOXACIN 500 MG/1
500 TABLET, FILM COATED ORAL DAILY
Qty: 10 TABLET | Refills: 0 | Status: SHIPPED | OUTPATIENT
Start: 2022-01-05 | End: 2022-01-15

## 2022-01-05 RX ORDER — INHALER, ASSIST DEVICES
1 SPACER (EA) MISCELLANEOUS AS NEEDED
Qty: 1 EACH | Refills: 0 | Status: SHIPPED | OUTPATIENT
Start: 2022-01-05

## 2022-01-05 RX ORDER — PROMETHAZINE HYDROCHLORIDE AND CODEINE PHOSPHATE 6.25; 1 MG/5ML; MG/5ML
5-10 SOLUTION ORAL
Qty: 240 ML | Refills: 0 | Status: SHIPPED | OUTPATIENT
Start: 2022-01-05 | End: 2022-01-12

## 2022-01-05 NOTE — PROGRESS NOTES
Yudith Aquino is a 40 y.o. female who was seen by synchronous (real-time) audio-video technology on 1/5/2022 for Positive For Covid-19    Assessment & Plan:     Diagnoses and all orders for this visit:    1. COVID-19  -     promethazine-codeine (PHENERGAN with CODEINE) 6.25-10 mg/5 mL syrup; Take 5-10 mL by mouth four (4) times daily as needed for Cough for up to 7 days. Max Daily Amount: 40 mL. 2. Severe obesity (BMI 35.0-35.9 with comorbidity) ([facility] [address]    Other orders  -     levoFLOXacin (LEVAQUIN) 500 mg tablet; Take 1 Tablet by mouth daily for 10 days. -     albuterol (PROVENTIL HFA, VENTOLIN HFA, PROAIR HFA) 90 mcg/actuation inhaler; Take 2 Puffs by inhalation every four (4) hours as needed for Wheezing or Shortness of Breath. -     inhalational spacing device (Aerochamber MV); 1 Each by Does Not Apply route as needed for Wheezing. Follow-up and Dispositions    · Return if symptoms worsen or fail to improve. I spent at least 15 minutes on this visit with this established patient. 712  Subjective:   Patient states she tested positive for COVID on 12/28/2021 with a home test.  Comments symptoms began on 12/26/2021 with chills, body aches, cough, congestion, headache. Comments she has take multiple otc medication without improvement in cough and chest heaviness. States body aches and headache have improved. Prior to Admission medications    Medication Sig Start Date End Date Taking? Authorizing Provider   atenoloL (TENORMIN) 25 mg tablet Take 1 Tablet by mouth nightly. 12/30/21   Zenia WISE NP   rosuvastatin (CRESTOR) 20 mg tablet Take 1 Tablet by mouth nightly. 12/16/21   Zenia WISE NP   empagliflozin (Jardiance) 10 mg tablet Take 1 Tablet by mouth daily.  11/29/21   Zenia WISE NP   flash glucose sensor (FreeStyle Erica Rings 2 Sensor) kit Check glucose 3 times per day 11/3/21   Zenia WISE NP   insulin glargine (Basaglar KwikPen U-100 Insulin) 100 unit/mL (3 mL) inpn 16 Units by SubCUTAneous route daily. Patient taking differently: 20 Units by SubCUTAneous route daily. 10/21/21   Ronda Cabot T, NP   Insulin Needles, Disposable, (Miranda Pen Needle) 32 gauge x 5/32\" ndle USE ONCE DAILY WITH BASAGLAR 10/21/21   Ronda Cabot T, NP   valsartan (DIOVAN) 320 mg tablet Take 1 Tablet by mouth daily. 10/5/21   Ronda Cabot T, NP   flash glucose sensor (FreeStyle Esmer 14 Day Sensor) kit USE TO CHECK GLUCOSE THREE TIMES DAILY. CHANGE SENSOR EVERY 14 DAYS 9/24/21   Ronda Cabot T, NP   ergocalciferol (ERGOCALCIFEROL) 1,250 mcg (50,000 unit) capsule TAKE 1 CAPSULE BY MOUTH EVERY 7 DAYS 9/14/21   Ronda Cabot T, NP   butalbital-acetaminophen-caffeine (FIORICET, ESGIC) -40 mg per tablet TAKE 1 TO 2 TABLETS BY MOUTH EVERY 4 TO 6 HOURS AS NEEDED FOR HEADACHE NOT TO EXCEED 6 TABLETS IN 24 HOURS 8/26/21   Ronda Cabot T, NP   dextroamphetamine-amphetamine (ADDERALL) 20 mg tablet Take 1 Tablet by mouth two (2) times a day. Max Daily Amount: 40 mg. 8/26/21   Ronda Cabot T, NP   SITagliptin (Januvia) 100 mg tablet TAKE 1 TABLET BY MOUTH DAILY 8/18/21   Ronda Cabot T, NP   albuterol (PROVENTIL HFA, VENTOLIN HFA, PROAIR HFA) 90 mcg/actuation inhaler Take 2 Puffs by inhalation every four (4) hours as needed for Wheezing or Shortness of Breath. 2/7/21   Ronda Cabot T, NP   clotrimazole-betamethasone (LOTRISONE) topical cream Apply to affected area every 12 hrs x2 weeks 12/13/19   Ronda Cabot T, NP   flash glucose sensor (FREESTYLE ESMER 14 DAY SENSOR) kit Check glucose 3 times a day 4/24/19   Ronda Cabot T, NP   flash glucose scanning reader (FREESTYLE ESMER 14 DAY READER) misc To use with esmer sensor.  Check glucose 3 times a day 4/16/19   Carmen Garcia NP     Patient Active Problem List   Diagnosis Code    [facility], [facility] E11.9    Hyperlipidemia E78.5    ADHD (attention deficit hyperactivity disorder) F90.9    Severe obesity ([facility] [address] E66.01     Patient Active Problem List Diagnosis Date Noted    Severe obesity ([facility] [address] 2020    ADHD (attention deficit hyperactivity disorder)     Diabetes mellitus, type II (HCC)     Hyperlipidemia      Allergies   Allergen Reactions    Lisinopril Cough    Nsaids (Non-Steroidal Anti-Inflammatory Drug) Other (comments)     Polycystic kidney disease     Past Medical History:   Diagnosis Date    ADHD (attention deficit hyperactivity disorder)     Diabetes ([facility]     Diabetes mellitus, type II ([facility]     History of echocardiogram 2015    EF 60-65%. No RWMA. Mild conc LVH. No significant valvular heart disease.  History of uterine fibroid     Holter monitor, normal 2015    Sinus rhythm, avg HR 99 bpm (range  bpm). Benign Holter study.  Hyperlipidemia     LLE venous duplex 2015    Left leg:  No DVT.  Polycystic kidney disease     Psychiatric disorder      Past Surgical History:   Procedure Laterality Date    HX BREAST BIOPSY Right     HX GYN      hysterectomy    HX HYSTERECTOMY          HX ORTHOPAEDIC  2014    knee    HX WISDOM TEETH EXTRACTION       Family History   Problem Relation Age of Onset    Diabetes Other     Heart Disease Other     Hypertension Other     Hypertension Mother     Diabetes Mother     High Cholesterol Mother     Diabetes Father     Hypertension Father     High Cholesterol Father      Social History     Tobacco Use    Smoking status: Former Smoker     Quit date: 2000     Years since quittin.0    Smokeless tobacco: Never Used   Substance Use Topics    Alcohol use: Yes     Comment: socially       ROS    Objective:   No flowsheet data found.    General: alert, cooperative, no distress   Mental  status: normal mood, behavior, speech, dress, motor activity, and thought processes, able to follow commands   HENT: NCAT   Neck: no visualized mass   Resp: no respiratory distress   Neuro: no gross deficits   Skin: no discoloration or lesions of concern on visible areas   Psychiatric: normal affect, consistent with stated mood, no evidence of hallucinations     Additional exam findings: We discussed the expected course, resolution and complications of the diagnosis(es) in detail. Medication risks, benefits, costs, interactions, and alternatives were discussed as indicated. I advised her to contact the office if her condition worsens, changes or fails to improve as anticipated. She expressed understanding with the diagnosis(es) and plan. Eliseo Munoz, was evaluated through a synchronous (real-time) audio-video encounter. The patient (or guardian if applicable) is aware that this is a billable service. Verbal consent to proceed has been obtained within the past 12 months. The visit was conducted pursuant to the emergency declaration under the [address] [place] [address], [address] [place] [address] authority and the [employer] and [employer] Act. Patient identification was verified, and a caregiver was present when appropriate. The patient was located in a state where the provider was credentialed to provide care.     Nikolas Rodriguez NP

## 2022-01-16 ENCOUNTER — PATIENT MESSAGE (OUTPATIENT)
Dept: FAMILY MEDICINE CLINIC | Age: 47
End: 2022-01-16

## 2022-01-17 NOTE — TELEPHONE ENCOUNTER
Last Visit: 1/5/22 with KYLE Newman  Next Appointment: none  Previous Refill Encounter(s): 1/28/20 #100 with 11 refills    Requested Prescriptions     Pending Prescriptions Disp Refills    glucose blood VI test strips (OneTouch Verio test strips) strip 300 Strip 3     Sig: USE TO CHECK GLUCOSE THREE TIMES DAILY

## 2022-01-18 RX ORDER — BLOOD SUGAR DIAGNOSTIC
STRIP MISCELLANEOUS
Qty: 300 STRIP | Refills: 3 | Status: SHIPPED | OUTPATIENT
Start: 2022-01-18

## 2022-01-27 ENCOUNTER — OFFICE VISIT (OUTPATIENT)
Dept: ORTHOPEDIC SURGERY | Age: 47
End: 2022-01-27
Payer: COMMERCIAL

## 2022-01-27 ENCOUNTER — TELEPHONE (OUTPATIENT)
Dept: FAMILY MEDICINE CLINIC | Age: 47
End: 2022-01-27

## 2022-01-27 VITALS
HEART RATE: 74 BPM | OXYGEN SATURATION: 96 % | WEIGHT: 225 LBS | HEIGHT: 66 IN | BODY MASS INDEX: 36.16 KG/M2 | TEMPERATURE: 97.7 F

## 2022-01-27 DIAGNOSIS — S83.242A TEAR OF MEDIAL MENISCUS OF LEFT KNEE, UNSPECIFIED TEAR TYPE, UNSPECIFIED WHETHER OLD OR CURRENT TEAR, INITIAL ENCOUNTER: ICD-10-CM

## 2022-01-27 DIAGNOSIS — M25.562 LEFT KNEE PAIN, UNSPECIFIED CHRONICITY: Primary | ICD-10-CM

## 2022-01-27 PROCEDURE — 73560 X-RAY EXAM OF KNEE 1 OR 2: CPT | Performed by: ORTHOPAEDIC SURGERY

## 2022-01-27 PROCEDURE — 99214 OFFICE O/P EST MOD 30 MIN: CPT | Performed by: ORTHOPAEDIC SURGERY

## 2022-01-27 RX ORDER — TRAMADOL HYDROCHLORIDE 50 MG/1
50 TABLET ORAL
Qty: 21 TABLET | Refills: 0 | Status: SHIPPED | OUTPATIENT
Start: 2022-01-27 | End: 2022-02-03

## 2022-01-27 NOTE — PROGRESS NOTES
Yudith Aquino  1975   Chief Complaint   Patient presents with    Knee Pain     Left knee injury on 1/25/22        HISTORY OF PRESENT ILLNESS  Yudith Aquino is a 40 y.o. female who presents today for evaluation of left knee pain. She rates her pain 5/10 today. Pain has been present since 1/25/2022 after stepping off a step. She heard a pop with associated pain. Has swelling and pain around the knee including posteriorly. She has been icing and elevating the knee with minimal relief. She has not been weight bearing since the injury. She has polycystic kidney disease and is unable to take NSAIDs. Presents today in a wheelchair and crutches. Patient describes the pain as aching, burning and sharp that is Constant in nature. Symptoms are worse with Walking; Standing; Bending; Stretching; Straightening and is better with  Elevation. Associated symptoms include Swelling. Since problem started, it: is unchanged. Pain does not wake patient up at night. Has taken NSAIDs for the problem. Has tried following treatments: Injections:NO; Brace:NO; Therapy:NO; Cane/Crutch:YES       Allergies   Allergen Reactions    Lisinopril Cough    Nsaids (Non-Steroidal Anti-Inflammatory Drug) Other (comments)     Polycystic kidney disease        Past Medical History:   Diagnosis Date    ADHD (attention deficit hyperactivity disorder)     Diabetes ([facility]     Diabetes mellitus, type II ([facility]     History of echocardiogram 09/02/2015    EF 60-65%. No RWMA. Mild conc LVH. No significant valvular heart disease.  History of uterine fibroid     Holter monitor, normal 09/02/2015    Sinus rhythm, avg HR 99 bpm (range  bpm). Benign Holter study.  Hyperlipidemia     LLE venous duplex 05/05/2015    Left leg:  No DVT.     Polycystic kidney disease     Psychiatric disorder       Social History     Socioeconomic History    Marital status:      Spouse name: Not on file    Number of children: Not on file  Years of education: Not on file    Highest education level: Not on file   Occupational History    Not on file   Tobacco Use    Smoking status: Former Smoker     Quit date: 2000     Years since quittin.0    Smokeless tobacco: Never Used   Substance and Sexual Activity    Alcohol use: Yes     Comment: socially    Drug use: No    Sexual activity: Yes     Partners: Male     Birth control/protection: None     Comment:    Other Topics Concern     Service Not Asked    Blood Transfusions Not Asked    Caffeine Concern Yes     Comment: 1 cup a day    Occupational Exposure Not Asked    Hobby Hazards Not Asked    Sleep Concern Not Asked    Stress Concern Not Asked    Weight Concern Not Asked    Special Diet Not Asked    Back Care Not Asked    Exercise No    Bike Helmet Not Asked    Seat Belt Yes    Self-Exams Not Asked   Social History Narrative    ** Merged History Encounter **          Social Determinants of Health     Financial Resource Strain:     Difficulty of Paying Living Expenses: Not on file   Food Insecurity:     Worried About Running Out of Food in the Last Year: Not on file    Bertin of Food in the Last Year: Not on file   Transportation Needs:     Lack of Transportation (Medical): Not on file    Lack of Transportation (Non-Medical):  Not on file   Physical Activity:     Days of Exercise per Week: Not on file    Minutes of Exercise per Session: Not on file   Stress:     Feeling of Stress : Not on file   Social Connections:     Frequency of Communication with Friends and Family: Not on file    Frequency of Social Gatherings with Friends and Family: Not on file    Attends [employer] Services: Not on file    Active Member of Clubs or Organizations: Not on file    Attends Club or Organization Meetings: Not on file    Marital Status: Not on file   Intimate Partner Violence:     Fear of Current or Ex-Partner: Not on file    Emotionally Abused: Not on file   Carin Abraham Physically Abused: Not on file    Sexually Abused: Not on file   Housing Stability:     Unable to Pay for Housing in the Last Year: Not on file    Number of Places Lived in the Last Year: Not on file    Unstable Housing in the Last Year: Not on file      Past Surgical History:   Procedure Laterality Date    HX BREAST BIOPSY Right     HX GYN      hysterectomy    HX HYSTERECTOMY      2007    HX ORTHOPAEDIC  12/2014    knee    HX WISDOM TEETH EXTRACTION        Family History   Problem Relation Age of Onset    Diabetes Other     Heart Disease Other     Hypertension Other     Hypertension Mother     Diabetes Mother     High Cholesterol Mother     Diabetes Father     Hypertension Father     High Cholesterol Father       Current Outpatient Medications   Medication Sig    glucose blood VI test strips (OneTouch Verio test strips) strip USE TO CHECK GLUCOSE THREE TIMES DAILY    albuterol (PROVENTIL HFA, VENTOLIN HFA, PROAIR HFA) 90 mcg/actuation inhaler Take 2 Puffs by inhalation every four (4) hours as needed for Wheezing or Shortness of Breath.  inhalational spacing device (Aerochamber MV) 1 Each by Does Not Apply route as needed for Wheezing.  atenoloL (TENORMIN) 25 mg tablet Take 1 Tablet by mouth nightly.  rosuvastatin (CRESTOR) 20 mg tablet Take 1 Tablet by mouth nightly.  empagliflozin (Jardiance) 10 mg tablet Take 1 Tablet by mouth daily.  flash glucose sensor (FreeStyle Esmer 2 Sensor) kit Check glucose 3 times per day    insulin glargine (Basaglar KwikPen U-100 Insulin) 100 unit/mL (3 mL) inpn 16 Units by SubCUTAneous route daily. (Patient taking differently: 20 Units by SubCUTAneous route daily.)    Insulin Needles, Disposable, (Miranda Pen Needle) 32 gauge x 5/32\" ndle USE ONCE DAILY WITH BASAGLAR    valsartan (DIOVAN) 320 mg tablet Take 1 Tablet by mouth daily.  flash glucose sensor (FreeStyle Esmer 14 Day Sensor) kit USE TO CHECK GLUCOSE THREE TIMES DAILY.  CHANGE SENSOR EVERY 14 DAYS    ergocalciferol (ERGOCALCIFEROL) 1,250 mcg (50,000 unit) capsule TAKE 1 CAPSULE BY MOUTH EVERY 7 DAYS    butalbital-acetaminophen-caffeine (FIORICET, ESGIC) -40 mg per tablet TAKE 1 TO 2 TABLETS BY MOUTH EVERY 4 TO 6 HOURS AS NEEDED FOR HEADACHE NOT TO EXCEED 6 TABLETS IN 24 HOURS    dextroamphetamine-amphetamine (ADDERALL) 20 mg tablet Take 1 Tablet by mouth two (2) times a day. Max Daily Amount: 40 mg.    SITagliptin (Januvia) 100 mg tablet TAKE 1 TABLET BY MOUTH DAILY    clotrimazole-betamethasone (LOTRISONE) topical cream Apply to affected area every 12 hrs x2 weeks    flash glucose sensor (FREESTYLE NOLAN 14 DAY SENSOR) kit Check glucose 3 times a day    flash glucose scanning reader (FREESTYLE NOLAN 14 DAY READER) misc To use with nolan sensor. Check glucose 3 times a day     No current facility-administered medications for this visit. REVIEW OF SYSTEM   Patient denies: Weight loss, Fever/Chills, HA, Visual changes, Fatigue, Chest pain, SOB, Abdominal pain, N/V/D/C, Blood in stool or urine, Edema. Pertinent positive as above in HPI. All others were negative    PHYSICAL EXAM:   Visit Vitals  Pulse 74   Temp 97.7 °F (36.5 °C) (Temporal)   Ht 5' 6\" (1.676 m)   Wt 225 lb (102.1 kg)   LMP 01/01/2007   SpO2 96%   BMI 36.32 kg/m²     The patient is a well-developed, well-nourished female   in no acute distress. The patient is alert and oriented times three. The patient is alert and oriented times three. Mood and affect are normal.  LYMPHATIC: lymph nodes are not enlarged and are within normal limits  SKIN: normal in color and non tender to palpation. There are no bruises or abrasions noted. NEUROLOGICAL: Motor sensory exam is within normal limits. Reflexes are equal bilaterally.  There is normal sensation to pinprick and light touch  MUSCULOSKELETAL:  Examination Left knee Right knee   Skin Intact Intact   Range of motion  0-130   Effusion + -   Medial joint line tenderness + -   Lateral joint line tenderness + -   Tenderness Pes Bursa - -   Tenderness insertion MCL - -   Tenderness insertion LCL - -   Olegs - -   Patella crepitus + -   Patella grind - -   Lachman - -   Pivot shift - -   Anterior drawer - -   Posterior drawer - -   Varus stress - -   Valgus stress - -   Neurovascular Intact Intact   Calf Swelling and Tenderness to Palpation - -   Daniella's Test - -   Hamstring Cord Tightness - -        PROCEDURE: none    IMAGING: XR of the left knee with 2 views obtained in the office dated 1/27/2022 was reviewed and read by Dr. Jolynn Song: Mild/moderate degenerative changes in the lateral compartment       IMPRESSION:      ICD-10-CM ICD-9-CM    1. Left knee pain, unspecified chronicity  M25.562 719.46 AMB POC XRAY, KNEE; 1/2 VIEWS   2. Tear of medial meniscus of left knee, unspecified tear type, unspecified whether old or current tear, initial encounter  S83.242A 836.0         PLAN:  1. Patient presents today with left knee pain due to a possible meniscus tear. We will order a MRI of the left knee and use voltaren cream to help with inflammation. Prescribed tramadol to help with pain. Advised her to continue with HEP to maintain mobility. Use ice to help with the pain. Return following MRI. Risk factors include: BMI>35, polycystic kidney disease, dm   2. No ultrasound exam indicated today  3. No cortisone injection indicated today   4. No Physical/Occupational Therapy indicated today  5. Yes diagnostic test indicated today: L KNEE MRI  6. No durable medical equipment indicated today  7. No referral indicated today   8. No medications indicated today:   9. Yes Narcotic indicated today Patient given pain medication for short term acute pain relief. Goal is to treat patient according to above plan and to ultimately have patient off all pain medications once appropriate.  If chronic pain management is required beyond what is expected for current orthopedic problem, will refer patient to pain management.  was reviewed and will be reviewed with every medication refill request.         RTC following MRI       Scribed by Faraz Anne S [facility] [address]) as dictated by Roge Tan MD    I, Dr. Roge Tan, confirm that all documentation is accurate.     Roge Tan M.D.   [phone] [facility]

## 2022-01-28 NOTE — TELEPHONE ENCOUNTER
Pharmacy Progress Note - Telephone Encounter    S/O: Ms. Jorge Riojas 40 y.o. female, referred by Dr. Shara Allan, KYLE, was contacted via an outbound telephone call to discuss diabetes management today. Verified patients identifiers (name & ) per HIPAA policy.     - Patient states that she was able to work out the Dc Quique issue and has continued on that medication   - She is doing well with the [address] and is using 12 - 16 units per day depending upon her blood sugars  - She does report having a faulty sensor that showed her blood sugars spiking into the 200 range and she could not figure out why. Since changing that sensor, her blood sugars have come back down to her normal target range. A/P:  - AGP report shows an overall BG average of 136 over the past 90 days, which estimates an A1c of about 6.6%. Congratulated patient on this!  - Instructed patient to call the [phone] regarding the faulty sensor; they should be able to send her a new one for free  - She is to continue Art uRshing to follow up after next A1c check or sooner if requested by patient   - Patient endorses understanding to the provided information. All questions answered at this time. There are no discontinued medications. No orders of the defined types were placed in this encounter. Thank you,  YAZAN Aguilar        For Pharmacy Admin Tracking Only     CPA in place:  Yes   Recommendation Provided To: Patient/Caregiver: 0 via Telephone   Time Spent (min): 20

## 2022-02-04 ENCOUNTER — HOSPITAL ENCOUNTER (OUTPATIENT)
Age: 47
Discharge: HOME | End: 2022-02-04
Attending: ORTHOPAEDIC SURGERY
Payer: COMMERCIAL

## 2022-02-04 DIAGNOSIS — S83.242A TEAR OF MEDIAL MENISCUS OF LEFT KNEE, UNSPECIFIED TEAR TYPE, UNSPECIFIED WHETHER OLD OR CURRENT TEAR, INITIAL ENCOUNTER: ICD-10-CM

## 2022-02-04 PROCEDURE — 73721 MRI JNT OF LWR EXTRE W/O DYE: CPT

## 2022-02-10 ENCOUNTER — OFFICE VISIT (OUTPATIENT)
Dept: ORTHOPEDIC SURGERY | Age: 47
End: 2022-02-10
Payer: COMMERCIAL

## 2022-02-10 VITALS
OXYGEN SATURATION: 98 % | TEMPERATURE: 97.8 F | HEART RATE: 85 BPM | BODY MASS INDEX: 36.16 KG/M2 | WEIGHT: 225 LBS | HEIGHT: 66 IN

## 2022-02-10 DIAGNOSIS — S83.282A TEAR OF LATERAL MENISCUS OF LEFT KNEE, CURRENT, UNSPECIFIED TEAR TYPE, INITIAL ENCOUNTER: Primary | ICD-10-CM

## 2022-02-10 DIAGNOSIS — M17.12 PRIMARY OSTEOARTHRITIS OF LEFT KNEE: ICD-10-CM

## 2022-02-10 PROCEDURE — 99214 OFFICE O/P EST MOD 30 MIN: CPT | Performed by: ORTHOPAEDIC SURGERY

## 2022-02-10 NOTE — PROGRESS NOTES
Jacobo Valenzuela  1975   Chief Complaint   Patient presents with    Knee Pain     left knee,mri results        HISTORY OF PRESENT ILLNESS  Jacobo Valenzuela is a 40 y.o. female who presents today for evaluation of left knee pain. She rates her pain 5/10 today. Pain has been present since 1/25/2022 after stepping off a step. She heard a pop with associated pain. Has swelling and pain around the knee including posteriorly. She has been icing and elevating the knee with minimal relief. She has polycystic kidney disease and is unable to take NSAIDs. She works from home. Previous left knee surgery in 2014. She has been working on controlling her A1C and keeping it well maintained. Patient denies any fever, chills, chest pain, shortness of breath or calf pain. The remainder of the review of systems is negative. There are no new illness or injuries to report since last seen in the office. There are no changes to medications, allergies, family or social history. PHYSICAL EXAM:   Visit Vitals  Pulse 85   Temp 97.8 °F (36.6 °C) (Temporal)   Ht 5' 6\" (1.676 m)   Wt 225 lb (102.1 kg)   LMP 01/01/2007   SpO2 98%   BMI 36.32 kg/m²     The patient is a well-developed, well-nourished female   in no acute distress. The patient is alert and oriented times three. The patient is alert and oriented times three. Mood and affect are normal.  LYMPHATIC: lymph nodes are not enlarged and are within normal limits  SKIN: normal in color and non tender to palpation. There are no bruises or abrasions noted. NEUROLOGICAL: Motor sensory exam is within normal limits. Reflexes are equal bilaterally.  There is normal sensation to pinprick and light touch  MUSCULOSKELETAL:  Examination Left knee   Skin Intact   Range of motion    Effusion +   Medial joint line tenderness +   Lateral joint line tenderness +   Tenderness Pes Bursa -   Tenderness insertion MCL -   Tenderness insertion LCL -   Olegs -   Patella crepitus + Patella grind -   Lachman -   Pivot shift -   Anterior drawer -   Posterior drawer -   Varus stress -   Valgus stress -   Neurovascular Intact   Calf Swelling and Tenderness to Palpation -   Daniella's Test -   Hamstring Cord Tightness -        PROCEDURE: none    IMAGING: MRI of the left knee dated 2/4/2022 was reviewed and read by Dr. Antonio Tate:   Charlesyvrose Anderson  1. Nondisplaced oblique undersurface tear in the body of the lateral meniscus     2. No traumatic finding in the medial weightbearing compartment      3. Degenerative changes including small area of full-thickness cartilage loss on the medial femoral condyle with subchondral osteophyte.  -Also with some deep cartilage fissuring on the patella      XR of the left knee with 2 views obtained in the office dated 1/27/2022 was reviewed and read by Dr. Antonio Tate: Mild/moderate degenerative changes in the lateral compartment       IMPRESSION:      ICD-10-CM ICD-9-CM    1. Tear of lateral meniscus of left knee, current, unspecified tear type, initial encounter  S83.282A 836.1    2. Primary osteoarthritis of left knee  M17.12 715.16         PLAN:  1. Patient presents today with left knee pain due to a lateral meniscus tear. I discussed treatment options today including surgery and conservative treatment. She would like more time to discuss surgery. We will proceed with authorizing Ellie Jennings and provided Kendra's card if she decides to continue with surgery. Return as needed. Risk factors include: BMI>35, polycystic kidney disease, dm   2. No ultrasound exam indicated today  3. No cortisone injection indicated today   4. No Physical/Occupational Therapy indicated today  5. No diagnostic test indicated today  6. No durable medical equipment indicated today  7. No referral indicated today   8. No medications indicated today:   9.  No Narcotic indicated today      RTC prn      Scribed by [facility]) as dictated by Chava Davenport MD    I, Dr. Antwon Siddiqui Pepito, confirm that all documentation is accurate.     Ervin Andujar M.D.   [facility]

## 2022-02-28 DIAGNOSIS — E55.9 HYPOVITAMINOSIS D: ICD-10-CM

## 2022-03-02 RX ORDER — ERGOCALCIFEROL 1.25 MG/1
CAPSULE ORAL
Qty: 12 CAPSULE | Refills: 0 | Status: SHIPPED | OUTPATIENT
Start: 2022-03-02 | End: 2022-06-03

## 2022-03-08 DIAGNOSIS — Z01.818 PREOP EXAMINATION: Primary | ICD-10-CM

## 2022-03-09 ENCOUNTER — DOCUMENTATION ONLY (OUTPATIENT)
Dept: ORTHOPEDIC SURGERY | Age: 47
End: 2022-03-09

## 2022-03-15 ENCOUNTER — OFFICE VISIT (OUTPATIENT)
Dept: FAMILY MEDICINE CLINIC | Age: 47
End: 2022-03-15
Payer: COMMERCIAL

## 2022-03-15 ENCOUNTER — TELEPHONE (OUTPATIENT)
Dept: ORTHOPEDIC SURGERY | Age: 47
End: 2022-03-15

## 2022-03-15 VITALS
RESPIRATION RATE: 14 BRPM | HEART RATE: 82 BPM | HEIGHT: 66 IN | WEIGHT: 213.8 LBS | SYSTOLIC BLOOD PRESSURE: 120 MMHG | BODY MASS INDEX: 34.36 KG/M2 | TEMPERATURE: 98.2 F | DIASTOLIC BLOOD PRESSURE: 84 MMHG | OXYGEN SATURATION: 100 %

## 2022-03-15 DIAGNOSIS — Z79.4 TYPE 2 DIABETES MELLITUS WITH HYPERGLYCEMIA, WITH LONG-TERM CURRENT USE OF INSULIN (HCC): Primary | ICD-10-CM

## 2022-03-15 DIAGNOSIS — Z01.818 PREOPERATIVE GENERAL PHYSICAL EXAMINATION: ICD-10-CM

## 2022-03-15 DIAGNOSIS — E11.65 TYPE 2 DIABETES MELLITUS WITH HYPERGLYCEMIA, WITH LONG-TERM CURRENT USE OF INSULIN (HCC): Primary | ICD-10-CM

## 2022-03-15 DIAGNOSIS — S83.282D DISLOCATION OF LEFT KNEE WITH LATERAL MENISCUS TEAR, SUBSEQUENT ENCOUNTER: ICD-10-CM

## 2022-03-15 DIAGNOSIS — E78.00 PURE HYPERCHOLESTEROLEMIA: ICD-10-CM

## 2022-03-15 DIAGNOSIS — S83.105D DISLOCATION OF LEFT KNEE WITH LATERAL MENISCUS TEAR, SUBSEQUENT ENCOUNTER: ICD-10-CM

## 2022-03-15 DIAGNOSIS — I10 ESSENTIAL HYPERTENSION: ICD-10-CM

## 2022-03-15 LAB — HBA1C MFR BLD HPLC: 7.8 %

## 2022-03-15 PROCEDURE — 83036 HEMOGLOBIN GLYCOSYLATED A1C: CPT | Performed by: NURSE PRACTITIONER

## 2022-03-15 PROCEDURE — 99213 OFFICE O/P EST LOW 20 MIN: CPT | Performed by: NURSE PRACTITIONER

## 2022-03-15 PROCEDURE — 3051F HG A1C>EQUAL 7.0%<8.0%: CPT | Performed by: NURSE PRACTITIONER

## 2022-03-15 RX ORDER — DAPAGLIFLOZIN 10 MG/1
10 TABLET, FILM COATED ORAL DAILY
COMMUNITY
Start: 2022-03-08

## 2022-03-15 RX ORDER — FLASH GLUCOSE SENSOR
KIT MISCELLANEOUS
Qty: 2 KIT | Refills: 5 | Status: SHIPPED | OUTPATIENT
Start: 2022-03-15 | End: 2022-09-09

## 2022-03-15 NOTE — PROGRESS NOTES
Sherice Navarro (: 1975) is a 40 y.o. female, {established vs new:98006::\"established\"} patient, here for evaluation of the following chief complaint(s):   No chief complaint on file. ASSESSMENT/PLAN:  Below is the assessment and plan developed based on review of pertinent history, labs, studies, and medications. 1. Preoperative general physical examination      No follow-ups on file. SUBJECTIVE/OBJECTIVE:  HPI    Review of Systems     No data recorded     Physical Exam    {Virtual visit PE with detailed exam Optional:68903}    {Time Documentation Optional:77260}    Tresa Loomis, was evaluated through a synchronous (real-time) audio-video encounter. The patient (or guardian if applicable) is aware that this is a billable service, which includes applicable co-pays. Verbal consent to proceed has been obtained. The visit was conducted pursuant to the emergency declaration under the [address] [place] [address] authority and the [employer] Act. Patient identification was verified, and a caregiver was present when appropriate. The patient was located at home in a state where the provider was licensed to provide care. An electronic signature was used to authenticate this note.   -- Lanie Robbins

## 2022-03-15 NOTE — PROGRESS NOTES
Preoperative Evaluation    Date of Exam: 3/15/2022    Jacobo Valenzuela is a 40 y.o. female (:1975) who presents for preoperative evaluation. Procedure/Surgery: Left lateral meniscus repair  Date of Procedure/Surgery: 3/21/2022  Surgeon: Dr. Craven Prior: Leanne Trejo  Primary Physician: Clair Robertson NP  Latex Allergy: no    Patient is scheduled for clearance with orthopedist tomorrow and scheduled for Thursday for stress test ordered by her cardiologist.  She was last seen by cardiology Dr. Dao Bloodgood office on 3/2/2022. Denies worsening for left knee pain    Problem List:     Patient Active Problem List    Diagnosis Date Noted    Severe obesity ([address] 2020    ADHD (attention deficit hyperactivity disorder)     Diabetes mellitus, type II ([address]     Hyperlipidemia      Medical History:     Past Medical History:   Diagnosis Date    ADHD (attention deficit hyperactivity disorder)     Diabetes ([address]     Diabetes mellitus, type II ([facility]     History of echocardiogram 2015    EF 60-65%. No RWMA. Mild conc LVH. No significant valvular heart disease.  History of uterine fibroid     Holter monitor, normal 2015    Sinus rhythm, avg HR 99 bpm (range  bpm). Benign Holter study.  Hyperlipidemia     LLE venous duplex 2015    Left leg:  No DVT.  Polycystic kidney disease     Psychiatric disorder      Allergies:      Allergies   Allergen Reactions    Lisinopril Cough    Nsaids (Non-Steroidal Anti-Inflammatory Drug) Other (comments)     Polycystic kidney disease      Medications:     Current Outpatient Medications   Medication Sig    ergocalciferol (ERGOCALCIFEROL) 1,250 mcg (50,000 unit) capsule TAKE 1 CAPSULE BY MOUTH EVERY 7 DAYS    SITagliptin (Januvia) 100 mg tablet TAKE 1 TABLET BY MOUTH DAILY    glucose blood VI test strips (OneTouch Verio test strips) strip USE TO CHECK GLUCOSE THREE TIMES DAILY    albuterol (PROVENTIL HFA, VENTOLIN HFA, PROAIR HFA) 90 mcg/actuation inhaler Take 2 Puffs by inhalation every four (4) hours as needed for Wheezing or Shortness of Breath.  inhalational spacing device (Aerochamber MV) 1 Each by Does Not Apply route as needed for Wheezing.  atenoloL (TENORMIN) 25 mg tablet Take 1 Tablet by mouth nightly.  rosuvastatin (CRESTOR) 20 mg tablet Take 1 Tablet by mouth nightly.  empagliflozin (Jardiance) 10 mg tablet Take 1 Tablet by mouth daily.  flash glucose sensor (FreeStyle Esmer 2 Sensor) kit Check glucose 3 times per day    insulin glargine (Basaglar KwikPen U-100 Insulin) 100 unit/mL (3 mL) inpn 16 Units by SubCUTAneous route daily. (Patient taking differently: 20 Units by SubCUTAneous route daily.)    Insulin Needles, Disposable, (Miranda Pen Needle) 32 gauge x 5/32\" ndle USE ONCE DAILY WITH BASAGLAR    valsartan (DIOVAN) 320 mg tablet Take 1 Tablet by mouth daily.  flash glucose sensor (FreeStyle Esmer 14 Day Sensor) kit USE TO CHECK GLUCOSE THREE TIMES DAILY. CHANGE SENSOR EVERY 14 DAYS    butalbital-acetaminophen-caffeine (FIORICET, ESGIC) -40 mg per tablet TAKE 1 TO 2 TABLETS BY MOUTH EVERY 4 TO 6 HOURS AS NEEDED FOR HEADACHE NOT TO EXCEED 6 TABLETS IN 24 HOURS    dextroamphetamine-amphetamine (ADDERALL) 20 mg tablet Take 1 Tablet by mouth two (2) times a day. Max Daily Amount: 40 mg.    clotrimazole-betamethasone (LOTRISONE) topical cream Apply to affected area every 12 hrs x2 weeks    flash glucose sensor (FREESTYLE ESMER 14 DAY SENSOR) kit Check glucose 3 times a day    flash glucose scanning reader (FREESTYLE ESMER 14 DAY READER) misc To use with esmer sensor. Check glucose 3 times a day     No current facility-administered medications for this visit.      Surgical History:     Past Surgical History:   Procedure Laterality Date    HX BREAST BIOPSY Right     HX GYN      hysterectomy    HX HYSTERECTOMY      2007    HX ORTHOPAEDIC  12/2014 knee    HX WISDOM TEETH EXTRACTION       Social History:     Social History     Socioeconomic History    Marital status:    Tobacco Use    Smoking status: Former Smoker     Quit date: 2000     Years since quittin.2    Smokeless tobacco: Never Used   Substance and Sexual Activity    Alcohol use: Yes     Comment: socially    Drug use: No    Sexual activity: Yes     Partners: Male     Birth control/protection: None     Comment:    Other Topics Concern    Caffeine Concern Yes     Comment: 1 cup a day    Exercise No    Seat Belt Yes   Social History Narrative    ** Merged History Encounter **          Recent use of: No recent use of aspirin (ASA), NSAIDS or steroids    Tetanus up to date: last tetanus booster 2012    Anesthesia Complications: None  History of abnormal bleeding : None  History of Blood Transfusions: no  Health Care Directive or Living Will: no    REVIEW OF SYSTEMS:  Review of Systems   Constitutional: Negative for chills and fever. Respiratory: Negative for shortness of breath. Cardiovascular: Negative for chest pain and palpitations. Musculoskeletal: Positive for joint pain (left knee). Neurological: Negative for dizziness and headaches. EXAM:   Visit Vitals  LMP 2007     Physical Exam  HENT:      Head: Normocephalic and atraumatic. Cardiovascular:      Rate and Rhythm: Normal rate and regular rhythm. Heart sounds: Normal heart sounds. No murmur heard. No friction rub. No gallop. Pulmonary:      Effort: Pulmonary effort is normal.      Breath sounds: Normal breath sounds. No wheezing, rhonchi or rales. Abdominal:      General: Bowel sounds are normal.      Palpations: Abdomen is soft. Tenderness: There is no abdominal tenderness. Musculoskeletal:      Cervical back: Normal range of motion and neck supple. Right knee: Tenderness present over the lateral joint line. Lymphadenopathy:      Cervical: No cervical adenopathy. Skin:     General: Skin is warm and dry. Neurological:      Mental Status: She is alert and oriented to person, place, and time. DIAGNOSTICS:   Results for orders placed or performed in visit on 03/15/22   AMB POC HEMOGLOBIN A1C   Result Value Ref Range    Hemoglobin A1c (POC) 7.8 %        IMPRESSION:     ICD-10-CM ICD-9-CM    1. Preoperative general physical examination  Z01.818 V72.83    2. Type 2 diabetes mellitus with hyperglycemia, with long-term current use of insulin (HCC)  E11.65 250.00 AMB POC HEMOGLOBIN A1C    Z79.4 790.29      V58.67    3. Essential hypertension  I10 401.9    4.  Pure hypercholesterolemia  E78.00 272.0      Orders Placed This Encounter    AMB POC HEMOGLOBIN A1C    Farxiga 10 mg tab tablet     No contraindications to planned surgery      Lucho Pickard NP   3/15/2022          Beverly Mcclain MD

## 2022-03-15 NOTE — PATIENT INSTRUCTIONS
A1C 7.8     Meniscus Surgery: Before Your Surgery  What is meniscus surgery? Meniscus surgery removes or fixes the cartilage between the bones in the knee. This cartilage is called the meniscus. Each knee has two of these rubbery pads of cartilage, one on either side. When a meniscus tears, your knee may be painful, swell, get stiff, or lock up. Your doctor may use small tools to remove parts of the damaged meniscus and smooth the edges. This is called a partial meniscectomy (say \"men-ih-SEK-armen\"). In some cases, tears in the meniscus can be sewn back together. But if your meniscus can't be repaired, the doctor may remove the damaged part. Meniscus surgery is usually done as arthroscopic surgery. Your doctor uses a lighted tube called an arthroscope, or scope. He or she puts the scope and other surgical tools through small cuts in your knee. These cuts are called incisions. They leave scars that usually fade with time. The surgery will take at least 1 hour. Most people go home the same day of the surgery. You may have to use crutches after surgery. If so, be sure you have a backpack or clothes with a lot of pockets to carry items. You may be able to go back to school in 1 to 2 weeks. If you have a job and you sit at work, you may be able to go back in 1 to 2 weeks. But if you are on your feet at work, it may take 4 to 6 weeks. If you are very physically active in your job, it may take 3 to 6 months. You may need physical rehabilitation (rehab) after surgery. The program may last for several months. At first, your physical therapist will work with you. Later, you will get exercises to do on your own. After surgery and rehab, you are likely to have less pain and more flexibility in your knee. How soon you can return to sports or exercise depends on how well you follow your rehab program and how well your knee heals.  If you had a partial meniscectomy, you might be able to play sports in about 1 to 2 months. If you had meniscus repair, it may be 3 to 6 months before you can play sports. Follow-up care is a key part of your treatment and safety. Be sure to make and go to all appointments, and call your doctor if you are having problems. It's also a good idea to know your test results and keep a list of the medicines you take. How do you prepare for surgery? Surgery can be stressful. This information will help you understand what you can expect. And it will help you safely prepare for surgery. Preparing for surgery    · Be sure you have someone to take you home. Anesthesia and pain medicine will make it unsafe for you to drive or get home on your own.     · Understand exactly what surgery is planned, along with the risks, benefits, and other options.     · If you take aspirin or some other blood thinner, ask your doctor if you should stop taking it before your surgery. Make sure that you understand exactly what your doctor wants you to do. These medicines increase the risk of bleeding.     · Tell your doctor ALL the medicines, vitamins, supplements, and herbal remedies you take. Some may increase the risk of problems during your surgery. Your doctor will tell you if you should stop taking any of them before the surgery and how soon to do it.     · Make sure your doctor and the hospital have a copy of your advance directive. If you don't have one, you may want to prepare one. It lets others know your health care wishes. It's a good thing to have before any type of surgery or procedure. What happens on the day of surgery? · Follow the instructions exactly about when to stop eating and drinking. If you don't, your surgery may be canceled. If your doctor told you to take your medicines on the day of surgery, take them with only a sip of water.     · Take a bath or shower before you come in for your surgery.  Do not apply lotions, perfumes, deodorants, or nail polish.     · Do not shave the surgical site yourself.     · Take off all jewelry and piercings. And take out contact lenses, if you wear them. At the hospital or surgery center   · Bring a picture ID.     · The area for surgery is often marked to make sure there are no errors.     · You will be kept comfortable and safe by your anesthesia provider. The anesthesia may make you sleep. Or it may just numb the area being worked on.     · The surgery will take at least 1 hour.     · Your leg may be in a leg brace to limit motion. You may need to wear a brace for 4 to 6 weeks after surgery.     · You may have a device that applies cold treatment to your knee. When should you call your doctor? · You have questions or concerns.     · You don't understand how to prepare for your surgery.     · You become ill before the surgery (such as fever, flu, or a cold).     · You need to reschedule or have changed your mind about having the surgery. Where can you learn more? Go to http://www.gray.com/  Enter J038 in the search box to learn more about \"Meniscus Surgery: Before Your Surgery. \"  Current as of: July 1, 2021               Content Version: 13.2  © 2174-3251 [employer], [employer]. Care instructions adapted under license by [employer] (which disclaims liability or warranty for this information). If you have questions about a medical condition or this instruction, always ask your healthcare professional. [place] [address] any warranty or liability for your use of this information.

## 2022-03-15 NOTE — TELEPHONE ENCOUNTER
Ted paper work completed and faxed with note keeping patient out of work 3-16-22 in order to completed pre op testing. Also faxed over MRI report with Ted paper work. Patient notified.

## 2022-03-15 NOTE — PROGRESS NOTES
Depression Screening:  3 most recent PHQ Screens 3/15/2022   PHQ Not Done -   Little interest or pleasure in doing things Not at all   Feeling down, depressed, irritable, or hopeless Not at all   Total Score PHQ 2 0       Learning Assessment:  Learning Assessment 2/28/2019   PRIMARY LEARNER Patient   HIGHEST LEVEL OF EDUCATION - PRIMARY LEARNER  SOME COLLEGE   BARRIERS PRIMARY LEARNER NONE   CO-LEARNER CAREGIVER No   PRIMARY LANGUAGE ENGLISH   LEARNER PREFERENCE PRIMARY DEMONSTRATION     -   ANSWERED BY self   RELATIONSHIP SELF       Abuse Screening:  Abuse Screening Questionnaire 8/22/2014   Do you ever feel afraid of your partner? N   Are you in a relationship with someone who physically or mentally threatens you? N   Is it safe for you to go home? Y       Fall Risk  No flowsheet data found. ADL  No flowsheet data found. Travel Screening:    Travel Screening     Question   Response    In the last month, have you been in contact with someone who was confirmed or suspected to have Coronavirus / COVID-19? No / Unsure    Have you had a COVID-19 viral test in the last 14 days? No    Do you have any of the following new or worsening symptoms? None of these    Have you traveled internationally or domestically in the last month? No      Travel History   Travel since 02/15/22    No documented travel since 02/15/22         Health Maintenance reviewed and discussed and ordered per Provider. Health Maintenance Due   Topic Date Due    Hepatitis C Screening  Never done    COVID-19 Vaccine (1) Never done    Pneumococcal 0-64 years (1 of 4 - PCV13) Never done    Foot Exam Q1  07/02/2020    MICROALBUMIN Q1  07/05/2020    Lipid Screen  07/05/2020    Eye Exam Retinal or Dilated  02/27/2021    Colorectal Cancer Screening Combo  Never done    Flu Vaccine (1) 09/01/2021    DTaP/Tdap/Td series (2 - Td or Tdap) 01/01/2022    A1C test (Diabetic or Prediabetic)  01/05/2022   .     Zehrahomero Aquino presents today for   Chief Complaint   Patient presents with    Pre-op Exam       Is someone accompanying this pt? no    Is the patient using any DME equipment during OV? no    Coordination of Care:  1. Have you been to the ER, urgent care clinic since your last visit? Hospitalized since your last visit? no    2. Have you seen or consulted any other health care providers outside of the [address] [place] [address] since your last visit? Include any pap smears or colon screening.  no

## 2022-03-16 ENCOUNTER — OFFICE VISIT (OUTPATIENT)
Dept: ORTHOPEDIC SURGERY | Age: 47
End: 2022-03-16

## 2022-03-16 VITALS
WEIGHT: 216 LBS | OXYGEN SATURATION: 100 % | HEART RATE: 85 BPM | BODY MASS INDEX: 34.72 KG/M2 | TEMPERATURE: 98 F | SYSTOLIC BLOOD PRESSURE: 138 MMHG | HEIGHT: 66 IN | DIASTOLIC BLOOD PRESSURE: 88 MMHG

## 2022-03-16 DIAGNOSIS — M17.12 PRIMARY OSTEOARTHRITIS OF LEFT KNEE: ICD-10-CM

## 2022-03-16 DIAGNOSIS — S83.282A TEAR OF LATERAL MENISCUS OF LEFT KNEE, CURRENT, UNSPECIFIED TEAR TYPE, INITIAL ENCOUNTER: Primary | ICD-10-CM

## 2022-03-16 RX ORDER — HYDROCODONE BITARTRATE AND ACETAMINOPHEN 7.5; 325 MG/1; MG/1
1 TABLET ORAL
Qty: 40 TABLET | Refills: 0 | Status: SHIPPED | OUTPATIENT
Start: 2022-03-16 | End: 2022-03-23

## 2022-03-16 NOTE — PROGRESS NOTES
Pharmacy Progress Note     Spoke with patient after PCP visit. A1c today was 7.8%, which is surprising since CGM data is showing an A1c of 6.8%. Patient does state that when she checks her blood sugars at home using a glucometer, the glucometer readings are  always higher than the CGM. Will send in [facility] 2 sensor to see if this will be more accurate for patient. Informed patient that she will need to download the [facility] [employer] 2 lisa for this. Continue current medications for now: Basaglar 16 units once daily, Farxiga 10 mg daily and Januvia 100 mg daily. Plan to review blood sugars virtually over the next few weeks to see if there is a big difference and make adjustments from there. Thank you,  Kaleigh Jackson. YAZAN Escobar                For Pharmacy Admin Tracking Only     CPA in place:  Yes   Recommendation Provided To: Patient/Caregiver: 1 via In person   Intervention Detail: New Rx: 1, reason: Improve Adherence   Gap Closed?: No   Intervention Accepted By: Patient/Caregiver: 1   Time Spent (min): 30

## 2022-03-16 NOTE — PATIENT INSTRUCTIONS
Dr. Ailin Ramirez Knee Arthroscopy Surgery    What is the surgery? - This is an outpatient procedure at either [place] [address] or [address] [place], [place] [address] will be completely asleep for procedure. Dr. Ailin Ramirez will make 2 small incisions in your knee. He will take a tour of your knee with the camera and then address the meniscal tear(s). We will be able to evaluate if any arthritis in your knee but this surgery is not to treat your arthritis. - Total surgery takes about 25-30 mins     What can you expect after surgery? - You will have a bulky dressing on your knee that you can remove 2 days after surgery. You will be able to shower 2 days after surgery but no soaking in a bath, hot tub, ocean or pool x 2 weeks to allow for full wound healing. No special brace is needed. - You will be on crutches or a walker when you leave the hospital. You can place weight on your leg as tolerated starting immediately. You are usually on crutches or your walker for about 4-5 days.   - Even though you can place weight on your leg, we recommend no walking or standing longer than 10mins for the first week. We will gradually increase your activities after that point.    - Dr. Ailin Ramirez will start physical therapy for you when you return for your 1 week post op apt  - It will take your 6-8 weeks to fully recover from your surgery. When can I return to work? 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 We recommend no prolonged walking or standing, climbing, kneeling, or crawling x 6-8 weeks. Not all knee arthroscopies are the same. The specifics of your individual case will be discussed at length with you by Dr. Ailin Ramirez and his Physician Assistant. Julieta Segura  Surgical Coordinator  [address] [facility] [place]. [place].  [address], [facility] Shi [email or web address]  P: [phone]  F: [phone]

## 2022-03-16 NOTE — PROGRESS NOTES
HISTORY AND PHYSICAL          Patient: Delio Shipman                MRN: [id number]       SSN: [id number]  YOB: 1975          AGE: 40 y.o. SEX: female      Patient scheduled for:  Left knee arthroscopic partial lateral menisectomy    Surgeon: Eudelia Leventhal MD    ANESTHESIA TYPE:  General    HISTORY:     The patient was seen in the office today for a preoperative history and physical for an upcoming above listed surgery. The patient is a pleasant 40 y.o. female who has a history of left knee pain . She rates her pain 5/10 today. Pain has been present since 1/25/2022 after stepping off a step. She heard a pop with associated pain. Has swelling and pain around the knee including posteriorly. She has been icing and elevating the knee with minimal relief. She has polycystic kidney disease and is unable to take NSAIDs. She works from home. Previous left knee surgery in 2014. She has been working on controlling her A1C and keeping it well maintained. Due to the current findings, affected activity of daily living and continued pain and discomfort, surgical intervention is indicated. The alternatives, risks, and complications, including but not limited to infection, blood loss, need for blood transfusion, neurovascular damage, susanne-incisional numbness, subcutaneous hematoma, bone fracture, anesthetic complications, DVT, PE, death, RSD, postoperative stiffness and pain, possible surgical scar, delayed healing and nonhealing, reflexive sympathetic dystrophy, damage to blood vessels and nerves, need for more surgery, MI, and stroke,  failure of hardware, gait disturbances,have been discussed. The patient understands and wishes to proceed with surgery. PAST MEDICAL HISTORY:     Past Medical History:   Diagnosis Date    ADHD (attention deficit hyperactivity disorder)     Diabetes ([facility]     [facility]     History of echocardiogram 09/02/2015    EF 60-65%. No RWMA. Mild conc LVH. No significant valvular heart disease.  History of uterine fibroid     Holter monitor, normal 09/02/2015    Sinus rhythm, avg HR 99 bpm (range  bpm). Benign Holter study.  Hyperlipidemia     LLE venous duplex 05/05/2015    Left leg:  No DVT.  Polycystic kidney disease     Psychiatric disorder        CURRENT MEDICATIONS:     Current Outpatient Medications   Medication Sig Dispense Refill    HYDROcodone-acetaminophen (Norco) 7.5-325 mg per tablet Take 1 Tablet by mouth every four (4) hours as needed for Pain for up to 7 days. Max Daily Amount: 6 Tablets. Do not take until after surgery (for acute post operative pain) 40 Tablet 0    Farxiga 10 mg tab tablet Take 10 mg by mouth daily.  flash glucose sensor (FreeStyle Esmer 2 Sensor) kit Use as directed to check blood sugars at least 3 times daily. 2 Kit 5    ergocalciferol (ERGOCALCIFEROL) 1,250 mcg (50,000 unit) capsule TAKE 1 CAPSULE BY MOUTH EVERY 7 DAYS 12 Capsule 0    SITagliptin (Januvia) 100 mg tablet TAKE 1 TABLET BY MOUTH DAILY 90 Tablet 2    glucose blood VI test strips (OneTouch Verio test strips) strip USE TO CHECK GLUCOSE THREE TIMES DAILY 300 Strip 3    albuterol (PROVENTIL HFA, VENTOLIN HFA, PROAIR HFA) 90 mcg/actuation inhaler Take 2 Puffs by inhalation every four (4) hours as needed for Wheezing or Shortness of Breath. 1 Each 0    inhalational spacing device (Aerochamber MV) 1 Each by Does Not Apply route as needed for Wheezing. 1 Each 0    atenoloL (TENORMIN) 25 mg tablet Take 1 Tablet by mouth nightly. 90 Tablet 2    rosuvastatin (CRESTOR) 20 mg tablet Take 1 Tablet by mouth nightly. 90 Tablet 3    Insulin Needles, Disposable, (Miranda Pen Needle) 32 gauge x 5/32\" ndle USE ONCE DAILY WITH BASAGLAR 100 Pen Needle 5    valsartan (DIOVAN) 320 mg tablet Take 1 Tablet by mouth daily.  90 Tablet 2    butalbital-acetaminophen-caffeine (FIORICET, ESGIC) -40 mg per tablet TAKE 1 TO 2 TABLETS BY MOUTH EVERY 4 TO 6 HOURS AS NEEDED FOR HEADACHE NOT TO EXCEED 6 TABLETS IN 24 HOURS 60 Tablet 2    flash glucose scanning reader (FREESTYLE NOLAN 14 DAY READER) misc To use with nolan sensor. Check glucose 3 times a day 2 Each 5    insulin glargine (Basaglar KwikPen U-100 Insulin) 100 unit/mL (3 mL) inpn 16 Units by SubCUTAneous route daily.  (Patient taking differently: 20 Units by SubCUTAneous route daily.) 5 Pen 1    clotrimazole-betamethasone (LOTRISONE) topical cream Apply to affected area every 12 hrs x2 weeks (Patient not taking: Reported on 3/15/2022) 15 g 1       ALLERGIES:     Allergies   Allergen Reactions    Lisinopril Cough    Nsaids (Non-Steroidal Anti-Inflammatory Drug) Other (comments)     Polycystic kidney disease         SURGICAL HISTORY:     Past Surgical History:   Procedure Laterality Date    HX BREAST BIOPSY Right     HX GYN      hysterectomy    HX HYSTERECTOMY          HX ORTHOPAEDIC  2014    knee    HX WISDOM TEETH EXTRACTION      [place] ANESTH,SURGERY OF SHOULDER Left 2018    ROTATOR CUFF REPAIR       SOCIAL HISTORY:     Social History     Socioeconomic History    Marital status:    Tobacco Use    Smoking status: Former Smoker     Quit date: 2000     Years since quittin.2    Smokeless tobacco: Never Used   Vaping Use    Vaping Use: Never used   Substance and Sexual Activity    Alcohol use: Yes     Comment: 2 drinks a week    Drug use: No    Sexual activity: Yes     Partners: Male     Birth control/protection: None     Comment:    Other Topics Concern    Caffeine Concern Yes     Comment: 1 cup a day    Exercise No    Seat Belt Yes   Social History Narrative    ** Merged History Encounter **            FAMILY HISTORY:     Family History   Problem Relation Age of Onset    Diabetes Other     Heart Disease Other     Hypertension Other     Hypertension Mother     Diabetes Mother     High Cholesterol Mother     Diabetes Father     Hypertension Father     High Cholesterol Father        REVIEW OF SYSTEMS:     Negative for fevers, chills, chest pain, shortness of breath, weight loss, recent illness     General: Negative for fever and chills. No unexpected change in weight. Denies fatigue. No change in appetite. Skin: Negative for rash or itching. HEENT: Negative for congestion, sore throat, neck pain and neck stiffness. No change in vision or hearing. Hasn't noted any enlarged lymph nodes in the neck. Cardiovascular:  Negative for chest pain and palpitations. Has not noted pedal edema. Respiratory: Negative for cough, colds, sinus, hemoptysis, shortness of breath and wheezing. Gastrointestinal: Negative for nausea and vomiting, rectal bleeding, coffee ground emesis, abdominal pain, diarrhea and constipation. Genitourinary: Negative for dysuria, frequency urgency, or burning on micturition. No flank pain, no foul smelling urine, no difficulty with initiating urination. Hematological: Negative for bleeding or easy bruising. Musculoskeletal: Negative  for arthralgias, back pain or neck pain. Neurological: Negative for dizziness, seizures or syncopal episodes. Denies headaches. Endocrine: Denies excessive thirst.  No heat/cold intolerance. Psychiatric: Negative for depression or insomnia. PHYSICAL EXAMINATION:     VITALS:   Visit Vitals  /88   Pulse 85   Temp 98 °F (36.7 °C) (Temporal)   Ht 5' 6\" (1.676 m)   Wt 216 lb (98 kg)   LMP 01/01/2007   SpO2 100%   BMI 34.86 kg/m²     GEN:  Well developed, well nourished 40 y.o. female in no acute distress. HEENT: Normocephalic and atraumatic. Eyes: Conjunctivae and EOM are normal.Pupils are equal, round, and reactive to light. External ear normal appearance, external nose normal appearing. Mouth/Throat: Oropharynx is clear and moist, able to handle oral secretions w/out difficulty, airway patent  NECK: Supple. Normal ROM, No lymphadenopathy. Trachea is midline.  No bruising, swelling or deformity  RESP: Clear to auscultation bilaterally. No wheezes, rales, rhonchi. Normal effort and breath sounds. No respiratory distress  CARDIO:  Normal rate, regular rhythm and normal heart sounds. No MGR. ABDOMEN: Soft, non-tender, non-distended, normoactive bowel sounds in all four quadrants. There is no tenderness. There is no rebound and no guarding. BACK: No CVA or spinal tenderness  BREAST:  Deferred  PELVIC:    Deferred   RECTAL:  Deferred   :           Deferred  EXTREMITIES: EXAMINATION OF: left knee  Examination Left knee   Skin Intact   Range of motion    Effusion +   Medial joint line tenderness +   Lateral joint line tenderness +   Tenderness Pes Bursa -   Tenderness insertion MCL -   Tenderness insertion LCL -   Olegs -   Patella crepitus +   Patella grind -   Lachman -   Pivot shift -   Anterior drawer -   Posterior drawer -   Varus stress -   Valgus stress -   Neurovascular Intact   Calf Swelling and Tenderness to Palpation -   Daniella's Test -   Hamstring Cord Tightness -            NEUROVASCULAR: Sensation intact to light touch and strength grossly intact and symmetrical. No nystagmus. Positive distal pulses and capillary refill. DVT ASSESSMENT:  There is not  calf tenderness. No evidence of DVT seen on physical exam.  MOTOR: In tact  PSYCH: Alert an oriented to person, place and time. Mood, memory, affect, behavior and judgment normal       RADIOGRAPHS & DIAGNOSTIC STUDIES:     MRI/xray reveals : IMAGING: MRI of the left knee dated 2/4/2022 was reviewed and read by Dr. Amee Sandra:   Brenda Ahmadi  1. Nondisplaced oblique undersurface tear in the body of the lateral meniscus     2. No traumatic finding in the medial weightbearing compartment      3.  Degenerative changes including small area of full-thickness cartilage loss on the medial femoral condyle with subchondral osteophyte.  -Also with some deep cartilage fissuring on the patella        XR of the left knee with 2 views obtained in the office dated 1/27/2022 was reviewed and read by Dr. Oren Walsh: Mild/moderate degenerative changes in the lateral compartment           LABS:       @  CBC:   Lab Results   Component Value Date/Time    WBC 10.3 09/29/2021 07:46 PM    RBC 4.59 09/29/2021 07:46 PM    HGB 11.7 (L) 09/29/2021 07:46 PM    HCT 35.5 09/29/2021 07:46 PM    PLATELET 850 16/00/1791 07:46 PM   , BMP:   Lab Results   Component Value Date/Time    Glucose 211 (H) 09/29/2021 07:46 PM    Sodium 137 09/29/2021 07:46 PM    Potassium 3.4 (L) 09/29/2021 07:46 PM    Chloride 104 09/29/2021 07:46 PM    CO2 25 09/29/2021 07:46 PM    BUN 13 09/29/2021 07:46 PM    Creatinine 0.78 09/29/2021 07:46 PM    Calcium 8.6 09/29/2021 07:46 PM    and CMP:   Lab Results   Component Value Date/Time    Glucose 211 (H) 09/29/2021 07:46 PM    Sodium 137 09/29/2021 07:46 PM    Potassium 3.4 (L) 09/29/2021 07:46 PM    Chloride 104 09/29/2021 07:46 PM    CO2 25 09/29/2021 07:46 PM    BUN 13 09/29/2021 07:46 PM    Creatinine 0.78 09/29/2021 07:46 PM    Calcium 8.6 09/29/2021 07:46 PM    Anion gap 8 09/29/2021 07:46 PM    BUN/Creatinine ratio 17 09/29/2021 07:46 PM    Alk. phosphatase 81 09/29/2021 07:46 PM    Protein, total 6.7 09/29/2021 07:46 PM    Albumin 2.3 (L) 09/29/2021 07:46 PM    Globulin 4.4 (H) 09/29/2021 07:46 PM    A-G Ratio 0.5 (L) 09/29/2021 07:46 PM   @    Preoperative labs were reviewed and are substantially within normal limits   EKG: scanned in chart      ASSESSMENT:       Encounter Diagnoses   Name Primary?  Tear of lateral meniscus of left knee, current, unspecified tear type, initial encounter Yes    Primary osteoarthritis of left knee        PLAN:     Again, the alternatives, risks, and complications, as well as expected outcome were discussed. The patient understands and agrees to proceed with left knee arthroscopic partial lateral menisectomy pending cardiac clearance.  The patient was counseled at length about the risks of kevin Covid-19 during their perioperative period and any recovery window from their procedure. The patient was made aware that kevin Covid-19  may worsen their prognosis for recovering from their procedure and lend to a higher morbidity and/or mortality risk. All material risks, benefits, and reasonable alternatives including postponing the procedure were discussed. The patient does  wish to proceed with the procedure at this time.    Patient given orders listed below:    Orders Placed This Encounter    HYDROcodone-acetaminophen (Norco) 7.5-325 mg per tablet         Kadie Molina PA-C  3/16/2022  8:48 AM

## 2022-03-18 PROBLEM — E66.01 SEVERE OBESITY (HCC): Status: ACTIVE | Noted: 2020-02-25

## 2022-04-04 ENCOUNTER — OFFICE VISIT (OUTPATIENT)
Dept: ORTHOPEDIC SURGERY | Age: 47
End: 2022-04-04
Payer: COMMERCIAL

## 2022-04-04 VITALS
BODY MASS INDEX: 33.27 KG/M2 | HEIGHT: 66 IN | TEMPERATURE: 98 F | OXYGEN SATURATION: 98 % | HEART RATE: 87 BPM | WEIGHT: 207 LBS

## 2022-04-04 DIAGNOSIS — S83.282A TEAR OF LATERAL MENISCUS OF LEFT KNEE, CURRENT, UNSPECIFIED TEAR TYPE, INITIAL ENCOUNTER: Primary | ICD-10-CM

## 2022-04-04 DIAGNOSIS — M17.12 PRIMARY OSTEOARTHRITIS OF LEFT KNEE: ICD-10-CM

## 2022-04-04 PROCEDURE — 99024 POSTOP FOLLOW-UP VISIT: CPT | Performed by: PHYSICIAN ASSISTANT

## 2022-04-04 NOTE — PROGRESS NOTES
Patient: Nazario Andersen  YOB: 1975       HISTORY:  The patient presents for reevaluation of her left knee status post arthroscopic partial lateral meniscectomy with grade 4 chondromalacia on 3/21/2022. Patient is improved, states pain is a 5 out of 10.  she has not gone to physical therapy. Notes an occasional catching sensation on the medial side that has improved since surgery. She states most of her pain is with ambulating. Patient denies any fever, chills, chest pain, shortness of breath or calf pain. There are no new illness or injuries to report since last seen in the office. PHYSICAL EXAMINATION:    Visit Vitals  Pulse 87   Temp 98 °F (36.7 °C) (Temporal)   Ht 5' 6\" (1.676 m)   Wt 207 lb (93.9 kg)   LMP 01/01/2007   SpO2 98%   BMI 33.41 kg/m²     The patient is a well-developed, well-nourished female in no acute distress. The patient is alert and oriented times three. The patient appears to be well groomed. Mood and affect are normal.   ORTHOPEDIC EXAM of Left knee: Inspection: Effusion present,  incisions clean, dry intact, sutures in place  TTP: lateral joint line  Range of motion: 0-110 flexion  Stability: Stable  Strength: 5/5  2+ distal pulses    IMPRESSION:  Status post Left knee arthroscopic partial lateral meniscectomy with grade 4 chondromalacia. PLAN: Surgery discussed at length today. Patient is weight bearing as tolerated and is to start with PT. OK to d/c use of crutches/walker. Advised to start with scar massage. Proceeding with Shelton Flatness to help with pain secondary to degenerative arthritis with joint space narrowing. Patient will follow up in 4 weeks.     Scribed by Landry Elliott [address]) as dictated by LUCY Deluna PA-C [phone] [facility]

## 2022-04-04 NOTE — LETTER
NOTIFICATION RETURN TO WORK / SCHOOL    4/4/2022 2:50 PM    Ms. Benny Stout  [address] [facility] [address]      To Whom It May Concern:    Benny Stout is currently under the care of [address] [place] [address] Dwight Berman. She was evaluated in the office today and is to remain on no duty at work until reevaluated in the office in 6 weeks. If there are questions or concerns please have the patient contact our office.         Sincerely,      SHAY Ruby

## 2022-04-06 ENCOUNTER — HOSPITAL ENCOUNTER (OUTPATIENT)
Dept: PHYSICAL THERAPY | Age: 47
Discharge: HOME | End: 2022-04-06
Attending: PHYSICIAN ASSISTANT
Payer: COMMERCIAL

## 2022-04-06 PROCEDURE — 97162 PT EVAL MOD COMPLEX 30 MIN: CPT

## 2022-04-06 PROCEDURE — 97110 THERAPEUTIC EXERCISES: CPT

## 2022-04-06 NOTE — PROGRESS NOTES
[facility]  [address]  [place], [address]  [phone] fax    Plan of Care/ Statement of Necessity for Physical Therapy Services    Patient name: Virgene Dandy Start of Care: 2022   Referral source: Óscar Jerryifeanyi [place] : 1975    Medical Diagnosis: Left knee pain [M25.562]  Payor: BLUE CROSS / [place]  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 Assessment/ key information: 40y.o. year old female presents with CC of left knee pain, edema, and difficulty with ambulation s/p left lateral menisectomy. Impairments noted today; decreased left knee PROM/AROM; decreased strength in left quad, ham, and hip grossly; impaired gait/balance. Patient will benefit from physical therapy to address deficits, and ultimately to return patient to prior level of function. Evaluation Complexity History HIGH Complexity :3+ comorbidities / personal factors will impact the outcome/ POC ; Examination MEDIUM Complexity : 3 Standardized tests and measures addressing body structure, function, activity limitation and / or participation in recreation  ;Presentation MEDIUM Complexity : Evolving with changing characteristics  ; Clinical Decision Making MEDIUM Complexity : FOTO score of 26-74  Overall Complexity Rating: MEDIUM  Problem List: pain affecting function, decrease ROM, decrease strength, impaired gait/ balance, decrease ADL/ functional abilitiies, decrease activity tolerance and decrease flexibility/ joint mobility   Treatment Plan may include any combination of the following: Therapeutic exercise, Neuromuscular re-education, Physical agent/modality, Gait/balance training, Manual therapy and Patient education  Patient / Family readiness to learn indicated by: asking questions, trying to perform skills and interest  Persons(s) to be included in education: patient (P)  Barriers to Learning/Limitations: None  Patient Goal (s): decrease pain, swelling, and improve ability to ambulate  Patient Self Reported Health Status: fair  Rehabilitation Potential: good    Short Term Goals: To be accomplished in 2 weeks:  1. I and compliant with HEP for self management of symptoms. 2. Increase left knee PROM to 0-120 to increase ease with sit<>stand. Long Term Goals: To be accomplished in 4 weeks:  1. Improve FOTO to 68 to indicate improved function with daily activities. 2. Increase left knee AROM to 0-130 to increase ease with negotiating stairs. 3. Increase left hip abd and ext strength to grossly 4/5 to improve stability for community ambulation. 4. Increase left hamstring strength to 5/5 to improve ability to negotiate stairs. 5. Increase left quad strength to 5/5 to increase ease with house cleaning. Frequency / Duration: Patient to be seen 2 times per week for 4 weeks. Patient/ Caregiver education and instruction: Diagnosis, prognosis, exercises   [x]  Plan of care has been reviewed with PTA    Reginorocío Coyne, PT 4/6/2022 1:10 PM    ________________________________________________________________________    I certify that the above Therapy Services are being furnished while the patient is under my care. I agree with the treatment plan and certify that this therapy is necessary.     [de-identified] Signature:____________Date:_________TIME:________     SHAY Cruz  ** Signature, Date and Time must be completed for valid certification **    Please sign and return to [facility]  [address]  [facility], [address]  [phone] fax

## 2022-04-06 NOTE — PROGRESS NOTES
PT DAILY TREATMENT NOTE 10-18    Patient Name: Kanika Dawn  Date:2022  : 1975  [x]  Patient  Verified  Payor: BLUE CROSS / Plan: [facility] / Product Type: PPO /    In time:1238  Out time:106  Total Treatment Time (min): 28  Visit #: 1 of 8    Medicare/BCBS Only   Total Timed Codes (min):  17 1:1 Treatment Time:  38       Treatment Area: Left knee pain [M25.562]    SUBJECTIVE  Pain Level (0-10 scale): 2  Any medication changes, allergies to medications, adverse drug reactions, diagnosis change, or new procedure performed?: [x] No    [] Yes (see summary sheet for update)  Subjective functional status/changes:   [] No changes reported  See eval    OBJECTIVE     11 min [x]Eval                  []Re-Eval       17 min Therapeutic Exercise:  [] See flow sheet :   Rationale: increase ROM and increase strength to improve the patients ability to perform daily activities      With   [] TE   [] TA   [] neuro   [] other: Patient Education: [x] Review HEP    [] Progressed/Changed HEP based on:   [] positioning   [] body mechanics   [] transfers   [] heat/ice application    [] other:      Other Objective/Functional Measures:      Pain Level (0-10 scale) post treatment: 2    ASSESSMENT/Changes in Function: see POC    Patient will continue to benefit from skilled PT services to modify and progress therapeutic interventions, address functional mobility deficits, address ROM deficits, address strength deficits, analyze and address soft tissue restrictions, analyze and cue movement patterns and address imbalance/dizziness to attain remaining goals. [x]  See Plan of Care  []  See progress note/recertification  []  See Discharge Summary         Progress towards goals / Updated goals:  Short Term Goals: To be accomplished in 2 weeks:  1. I and compliant with HEP for self management of symptoms. IE: issued HEP  2. Increase left knee PROM to 0-120 to increase ease with sit<>stand.   IE: 0-113  Long Term Goals: To be accomplished in 4 weeks:  1. Improve FOTO to 68 to indicate improved function with daily activities. IE: 40  2. Increase left knee AROM to 0-130 to increase ease with negotiating stairs. IE: 0-107  3. Increase left hip abd and ext strength to grossly 4/5 to improve stability for community ambulation. IE:3+/5  4. Increase left hamstring strength to 5/5 to improve ability to negotiate stairs. IE: 4/5  5. Increase left quad strength to 5/5 to increase ease with house cleaning.   IE:4+/5  PLAN  []  Upgrade activities as tolerated     [x]  Continue plan of care  []  Update interventions per flow sheet       []  Discharge due to:_  []  Other:_      Melecio Thornton, MPT, CMTPT 4/6/2022  1:17 PM    Future Appointments   Date Time Provider Glo Chavez   4/8/2022  1:00 PM Luis Fernando Aguilar, PT [facility]   4/11/2022  5:30 PM Tha Chapman, PTA [facility]   4/13/2022  6:00 PM Melania Birmingham, PTA [facility]   4/19/2022  6:00 PM Melania Birmingham, PTA [facility]   4/22/2022  6:00 PM Tamy Kimball, PT [facility]   4/25/2022  6:00 PM Soledad Rios, PTA [facility]   4/29/2022  6:00 PM Soledad Rios, PTA [facility]   5/2/2022  2:15 PM Anthony Linares PA [facility]

## 2022-04-08 ENCOUNTER — TELEPHONE (OUTPATIENT)
Dept: PHYSICAL THERAPY | Age: 47
End: 2022-04-08

## 2022-04-08 ENCOUNTER — APPOINTMENT (OUTPATIENT)
Dept: PHYSICAL THERAPY | Age: 47
End: 2022-04-08
Attending: PHYSICIAN ASSISTANT
Payer: COMMERCIAL

## 2022-04-11 ENCOUNTER — HOSPITAL ENCOUNTER (OUTPATIENT)
Dept: PHYSICAL THERAPY | Age: 47
Discharge: HOME | End: 2022-04-11
Attending: PHYSICIAN ASSISTANT
Payer: COMMERCIAL

## 2022-04-11 PROCEDURE — 97110 THERAPEUTIC EXERCISES: CPT

## 2022-04-11 PROCEDURE — 97140 MANUAL THERAPY 1/> REGIONS: CPT

## 2022-04-11 NOTE — PROGRESS NOTES
PT DAILY TREATMENT NOTE     Patient Name: Nazario Andersen  Date:2022  : 1975  [x]  Patient  Verified  Payor: BLUE CROSS [facility] [place] / Product Type: PPO /    In time:5:28  Out time:6:05  Total Treatment Time (min): 37  Visit #: 2 of 8    Medicare/BCBS Only   Total Timed Codes (min):  37 1:1 Treatment Time:  37       Treatment Area: Left knee pain [M25.562]    SUBJECTIVE  Pain Level (0-10 scale): 3/10  Any medication changes, allergies to medications, adverse drug reactions, diagnosis change, or new procedure performed?: [x] No    [] Yes (see summary sheet for update)  Subjective functional status/changes:   [] No changes reported  Pt reports compliance with HEP. OBJECTIVE    29 min Therapeutic Exercise:  [x] See flow sheet :   Rationale: increase ROM and increase strength to improve the patients ability to perform ADLs with increased ease. 8 min Manual Therapy:  Pt in supine: PROM to left knee; patellar mobs inferior/superior grade II-III   The manual therapy interventions were performed at a separate and distinct time from the therapeutic activities interventions. Rationale: decrease pain, increase ROM and increase tissue extensibility to perform ADLs with increased ease. With   [] TE   [] TA   [] neuro   [] other: Patient Education: [x] Review HEP    [] Progressed/Changed HEP based on:   [] positioning   [] body mechanics   [] transfers   [] heat/ice application    [] other:      Other Objective/Functional Measures: initiated exercises as per flow sheet. Pain Level (0-10 scale) post treatment: 4/10    ASSESSMENT/Changes in Function: Pt has slight increase in discomfort when performing strengthening exercises. Pt has increased available A/PROM of left knee.       Patient will continue to benefit from skilled PT services to modify and progress therapeutic interventions, address functional mobility deficits, address ROM deficits, address strength deficits, analyze and address soft tissue restrictions, analyze and cue movement patterns, analyze and modify body mechanics/ergonomics and assess and modify postural abnormalities to attain remaining goals. []  See Plan of Care  []  See progress note/recertification  []  See Discharge Summary         Progress towards goals / Updated goals:  Short Term Goals: To be accomplished in 2 weeks:  1. I and compliant with HEP for self management of symptoms.   IE: issued HEP  2. Increase left knee PROM to 0-120 to increase ease with sit<>stand. IE: 0-113  Long Term Goals: To be accomplished in 4 weeks:  1. Improve FOTO to 68 to indicate improved function with daily activities. IE: 40  2. Increase left knee AROM to 0-130 to increase ease with negotiating stairs. IE: 0-107  3. Increase left hip abd and ext strength to grossly 4/5 to improve stability for community ambulation. IE:3+/5  4. Increase left hamstring strength to 5/5 to improve ability to negotiate stairs. IE: 4/5  5. Increase left quad strength to 5/5 to increase ease with house cleaning.   IE:4+/5    PLAN  []  Upgrade activities as tolerated     [x]  Continue plan of care  []  Update interventions per flow sheet       []  Discharge due to:_  []  Other:_      Jak Johnston PTA 4/11/2022  5:37 PM    Future Appointments   Date Time Provider Glo Chavez   4/13/2022  6:00 PM [facility]   4/19/2022  6:00 PM [facility]   4/22/2022  6:00 PM Carlos Manuel Paiz PT [facility]   4/25/2022  6:00 PM Adelbert Duty, PTA [facility]   4/29/2022  6:00 PM Adelbert Duty, PTA [facility]   5/2/2022  2:15 PM Minnie Linares PA [facility]

## 2022-04-13 ENCOUNTER — PATIENT MESSAGE (OUTPATIENT)
Dept: ORTHOPEDIC SURGERY | Age: 47
End: 2022-04-13

## 2022-04-13 ENCOUNTER — APPOINTMENT (OUTPATIENT)
Dept: PHYSICAL THERAPY | Age: 47
End: 2022-04-13
Attending: PHYSICIAN ASSISTANT
Payer: COMMERCIAL

## 2022-04-13 NOTE — LETTER
NOTIFICATION RETURN TO WORK / SCHOOL    4/15/2022 10:36 AM    Ms. Omar Rangel  [address] [facility] [address]      To Whom It May Concern:    Omar Rangel is currently under the care of [address] [place] [address] Dwight Berman. She will return to work on full duty starting Monday, April 18, 2022. If there are questions or concerns please have the patient contact our office.         Sincerely,      Cheri Helms MD

## 2022-04-14 ENCOUNTER — DOCUMENTATION ONLY (OUTPATIENT)
Dept: ORTHOPEDIC SURGERY | Age: 47
End: 2022-04-14

## 2022-04-14 NOTE — PROGRESS NOTES
[employer] for Duty Certification was received and placed in the forms bin at [facility] on 4/14/22

## 2022-04-15 ENCOUNTER — TELEPHONE (OUTPATIENT)
Dept: FAMILY MEDICINE CLINIC | Age: 47
End: 2022-04-15

## 2022-04-15 ENCOUNTER — HOSPITAL ENCOUNTER (OUTPATIENT)
Dept: PHYSICAL THERAPY | Age: 47
Discharge: HOME | End: 2022-04-15
Attending: PHYSICIAN ASSISTANT
Payer: COMMERCIAL

## 2022-04-15 PROCEDURE — 97140 MANUAL THERAPY 1/> REGIONS: CPT

## 2022-04-15 PROCEDURE — 97110 THERAPEUTIC EXERCISES: CPT

## 2022-04-15 NOTE — TELEPHONE ENCOUNTER
Patient states the forms at [facility] received 04/14/22 need to be completed by today so she can return to work on Monday 04/18/22. Please advise.      Patient [phone]

## 2022-04-15 NOTE — PROGRESS NOTES
PT DAILY TREATMENT NOTE     Patient Name: Ana Maria Floresuse  Date:4/15/2022  : 1975  [x]  Patient  Verified  Payor: BLUE CROSS / Plan: [address] [place] [address] [place] / Product Type: PPO /    In time:2:31  Out time:3:20  Total Treatment Time (min): 49  Visit #: 3 of 8    Medicare/BCBS Only   Total Timed Codes (min):  39 1:1 Treatment Time:  37       Treatment Area: Left knee pain [M25.562]    SUBJECTIVE  Pain Level (0-10 scale): 0/10  Any medication changes, allergies to medications, adverse drug reactions, diagnosis change, or new procedure performed?: [x] No    [] Yes (see summary sheet for update)  Subjective functional status/changes:   [] No changes reported  \"It's my second meniscus tear in this knee. \"    OBJECTIVE    Modality rationale: decrease inflammation and decrease pain to improve the patients ability to perform ADL's.    Min Type Additional Details    [] Estim:  []Unatt       []IFC  []Premod                        []Other:  []w/ice   []w/heat  Position:  Location:    [] Estim: []Att    []TENS instruct  []NMES                    []Other:  []w/US   []w/ice   []w/heat  Position:  Location:    []  Traction: [] Cervical       []Lumbar                       [] Prone          []Supine                       []Intermittent   []Continuous Lbs:  [] before manual  [] after manual    []  Ultrasound: []Continuous   [] Pulsed                           []1MHz   []3MHz W/cm2:  Location:    []  Iontophoresis with dexamethasone         Location: [] Take home patch   [] In clinic   10 [x]  Ice     []  heat  []  Ice massage  []  Laser   []  Anodyne Position: Reclined  Location: Left knee    []  Laser with stim  []  Other:  Position:  Location:    []  Vasopneumatic Device    []  Right     []  Left  Pre-treatment girth:  Post-treatment girth:  Measured at (location):  Pressure:       [] lo [] med [] hi   Temperature: [] lo [] med [] hi   [] Skin assessment post-treatment:  []intact []redness- no adverse reaction []redness  adverse reaction:     31 min Therapeutic Exercise:  [x] See flow sheet :   Rationale: increase ROM and increase strength to improve the patients ability to perform ADL's.    8 min Manual Therapy:  Left patellar mobs, STM posterior knee musculature. Knee PROM flexion. Gentle massage of portal sites. Pt supine. The manual therapy interventions were performed at a separate and distinct time from the therapeutic activities interventions. Rationale: decrease pain, increase ROM, increase tissue extensibility and decrease trigger points to improve ease of performing functional activities. With   [x] TE   [] TA   [] neuro   [] other: Patient Education: [x] Review HEP    [] Progressed/Changed HEP based on:   [] positioning   [] body mechanics   [] transfers   [] heat/ice application    [] other:      Other Objective/Functional Measures: (B) thigh mm soreness while performing hip 3-way series and lateral band-walks. Pain Level (0-10 scale) post treatment: 0/10    ASSESSMENT/Changes in Function: General (B) LE atrophy. Pt's endurance/strength was challenged with resistance exercises. Pt fully participated in treatment and is motivated to improve. PROM Left knee end range flexion visually appears to be greater than ROM at IE however was not formally reassessed. Continue PT to increase LE strength/stability, increase knee ROM to improve ease of performing functional activities. Patient will continue to benefit from skilled PT services to modify and progress therapeutic interventions, address functional mobility deficits, address ROM deficits, address strength deficits, analyze and address soft tissue restrictions, analyze and cue movement patterns and analyze and modify body mechanics/ergonomics to attain remaining goals.      [x]  See Plan of Care  []  See progress note/recertification  []  See Discharge Summary         Progress towards goals / Updated goals:  Short Term Goals: To be accomplished in 2 weeks:  1. I and compliant with HEP for self management of symptoms.   IE: issued HEP  Current: Pt reports compliance. 4/15/2022  2. Increase left knee PROM to 0-120 to increase ease with sit<>stand. IE: 0-113  Long Term Goals: To be accomplished in 4 weeks:  1. Improve FOTO to 68 to indicate improved function with daily activities.   IE: 40  2. Increase left knee AROM to 0-130 to increase ease with negotiating stairs. IE: 0-107  3. Increase left hip abd and ext strength to grossly 4/5 to improve stability for community ambulation. IE:3+/5  4. Increase left hamstring strength to 5/5 to improve ability to negotiate stairs. IE: 4/5  5. Increase left quad strength to 5/5 to increase ease with house cleaning.   IE:4+/5    PLAN  []  Upgrade activities as tolerated     [x]  Continue plan of care  []  Update interventions per flow sheet       []  Discharge due to:_  []  Other:_      Elaine De La Torre PTA 4/15/2022  2:31 PM    Future Appointments   Date Time Provider Glo Chavez   4/19/2022  6:00 PM Giovanna English [place] [facility]   4/22/2022  6:00 PM Janes Smith PT [facility]   4/25/2022  6:00 PM Tresa Timmons PTA [facility]   4/29/2022  6:00 PM Tresa Timmons PTA [facility]   5/2/2022  2:15 PM Jonathan Linares PA [facility]

## 2022-04-17 DIAGNOSIS — E11.65 TYPE 2 DIABETES MELLITUS WITH HYPERGLYCEMIA, WITH LONG-TERM CURRENT USE OF INSULIN (HCC): ICD-10-CM

## 2022-04-17 DIAGNOSIS — Z79.4 TYPE 2 DIABETES MELLITUS WITH HYPERGLYCEMIA, WITH LONG-TERM CURRENT USE OF INSULIN (HCC): ICD-10-CM

## 2022-04-18 DIAGNOSIS — Z79.4 TYPE 2 DIABETES MELLITUS WITH HYPERGLYCEMIA, WITH LONG-TERM CURRENT USE OF INSULIN (HCC): ICD-10-CM

## 2022-04-18 DIAGNOSIS — E11.65 TYPE 2 DIABETES MELLITUS WITH HYPERGLYCEMIA, WITH LONG-TERM CURRENT USE OF INSULIN (HCC): ICD-10-CM

## 2022-04-18 RX ORDER — INSULIN GLARGINE 100 [IU]/ML
16 INJECTION, SOLUTION SUBCUTANEOUS DAILY
Qty: 5 PEN | Refills: 1 | Status: CANCELLED | OUTPATIENT
Start: 2022-04-18

## 2022-04-18 RX ORDER — INSULIN GLARGINE 100 [IU]/ML
20 INJECTION, SOLUTION SUBCUTANEOUS DAILY
Qty: 5 PEN | Refills: 1 | Status: SHIPPED | OUTPATIENT
Start: 2022-04-18 | End: 2022-10-03 | Stop reason: SDUPTHER

## 2022-04-19 ENCOUNTER — HOSPITAL ENCOUNTER (OUTPATIENT)
Dept: PHYSICAL THERAPY | Age: 47
Discharge: HOME | End: 2022-04-19
Attending: PHYSICIAN ASSISTANT
Payer: COMMERCIAL

## 2022-04-19 PROCEDURE — 97110 THERAPEUTIC EXERCISES: CPT

## 2022-04-19 PROCEDURE — 97140 MANUAL THERAPY 1/> REGIONS: CPT

## 2022-04-19 NOTE — PROGRESS NOTES
PT DAILY TREATMENT NOTE     Patient Name: Claudio Escamilla  Date:2022  : 1975  [x]  Patient  Verified  Payor: BLUE CROSS / Plan: [employer] [facility] / Product Type: PPO /    In time:6:00  Out time:6:37  Total Treatment Time (min): 37  Visit #: 4 of 8    Medicare/BCBS Only   Total Timed Codes (min):  37 1:1 Treatment Time:  35       Treatment Area: Left knee pain [M25.562]    SUBJECTIVE  Pain Level (0-10 scale): 0/10  Any medication changes, allergies to medications, adverse drug reactions, diagnosis change, or new procedure performed?: [x] No    [] Yes (see summary sheet for update)  Subjective functional status/changes:   [] No changes reported  \"Just some swelling, no pain. \"    OBJECTIVE    29 min Therapeutic Exercise:  [x] See flow sheet :   Rationale: increase ROM, increase strength and increase proprioception to improve the patients ability to perform ADL's.    8 min Manual Therapy:  Left patellar mobs. Knee flexion mobs. Gentle massage of portal sites. STM distal Left ITB. Pt supine. The manual therapy interventions were performed at a separate and distinct time from the therapeutic activities interventions. Rationale: decrease pain, increase ROM, increase tissue extensibility and decrease trigger points to improve ease of performing functional activities. With   [x] TE   [] TA   [] neuro   [] other: Patient Education: [x] Review HEP    [] Progressed/Changed HEP based on:   [] positioning   [] body mechanics   [] transfers   [] heat/ice application    [] other:      Other Objective/Functional Measures: Increased step ups to 6\". Added eccentric 2\" step downs, denied pain however expressed crepititis. AROM Left knee -1 to 133 degrees. Pain Level (0-10 scale) post treatment: 2/10    ASSESSMENT/Changes in Function: Demonstrated improved strength as pt was able to perform 6\" step ups, 2\" step downs and sincere. Mild dull pain after performing SAQ's with ball squeeze.  Fatigued after performing lateral band-walks and supine sincere. Met STG #1 and #2. Continue PT to increase strength/endurance to improve ease of performing functional activities. Patient will continue to benefit from skilled PT services to modify and progress therapeutic interventions, address functional mobility deficits, address ROM deficits, address strength deficits, analyze and address soft tissue restrictions, analyze and cue movement patterns and analyze and modify body mechanics/ergonomics to attain remaining goals. [x]  See Plan of Care  []  See progress note/recertification  []  See Discharge Summary         Progress towards goals / Updated goals:  Short Term Goals: To be accomplished in 2 weeks:  1. I and compliant with HEP for self management of symptoms.   IE: issued HEP  Current: Pt reports compliance. 4/15/2022  2. Increase left knee PROM to 0-120 to increase ease with sit<>stand. IE: 0-113  Current: Met -1 to 133 degrees. 4/19/2022  Long Term Goals: To be accomplished in 4 weeks:  1. Improve FOTO to 68 to indicate improved function with daily activities.   IE: 40  2. Increase left knee AROM to 0-130 to increase ease with negotiating stairs. IE: 0-107  3. Increase left hip abd and ext strength to grossly 4/5 to improve stability for community ambulation. IE:3+/5  4. Increase left hamstring strength to 5/5 to improve ability to negotiate stairs. IE: 4/5  5. Increase left quad strength to 5/5 to increase ease with house cleaning.   IE:4+/5    PLAN  []  Upgrade activities as tolerated     [x]  Continue plan of care  []  Update interventions per flow sheet       []  Discharge due to:_  []  Other:_      Jarek Nobles, PTA 4/19/2022  6:07 PM    Future Appointments   Date Time Provider Glo Chavez   4/22/2022  6:00 PM [facility]   4/25/2022  6:00 PM [facility]   4/29/2022  6:00 PM [facility]   5/2/2022  2:15 PM SHAY Orozco [facility]

## 2022-04-22 ENCOUNTER — TELEPHONE (OUTPATIENT)
Dept: PHYSICAL THERAPY | Age: 47
End: 2022-04-22

## 2022-04-25 ENCOUNTER — APPOINTMENT (OUTPATIENT)
Dept: PHYSICAL THERAPY | Age: 47
End: 2022-04-25
Attending: PHYSICIAN ASSISTANT
Payer: COMMERCIAL

## 2022-04-29 ENCOUNTER — APPOINTMENT (OUTPATIENT)
Dept: PHYSICAL THERAPY | Age: 47
End: 2022-04-29
Attending: PHYSICIAN ASSISTANT
Payer: COMMERCIAL

## 2022-05-01 NOTE — TELEPHONE ENCOUNTER
Followed up with patient regarding diabetes management. Blood sugars are starting to look a little more accurate now that she is using Validity Sensorsay 2, and she is learning to adjust. There are some spikes into the 200 range on some days; however, patient states that she was celebrating her birthday which was the reason for those spikes. She states that she is starting to get a little concerned because she was diagnosed with diabetic retinopathy, so she really wants to make sure we are getting her blood sugars down. She is still interested in getting back on Tresiba, as she states that medication worked better for her. Will see if we can submit a tier reduction to get [place] at a more affordable price for patient. In the  meantime, patient to continue current medications and write notes in her Doodle 2 lisa so we can better identify areas of improvement in her diet/routine. Thank you,  YAZAN Waite        For Pharmacy Admin Tracking Only     CPA in place:  Yes   Recommendation Provided To: Patient/Caregiver: 0 via Telephone   Intervention Accepted By: Patient/Caregiver: 0   Time Spent (min): 30

## 2022-05-10 RX ORDER — FLUCONAZOLE 150 MG/1
150 TABLET ORAL
Qty: 3 TABLET | Refills: 0 | Status: SHIPPED | OUTPATIENT
Start: 2022-05-10 | End: 2022-09-25

## 2022-05-13 NOTE — PROGRESS NOTES
[facility]  [address]  [place], [address]  [phone] fax    Physical Therapy Discharge Summary  Patient name: Ana Maria Khan Start of Care: 22   Referral source: Allie Gilmore, [address] : 1975   Medical/Treatment Diagnosis: Left knee pain [M25.562]  Payor: BLUE CROSS / Plan: [employer] 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 I and compliant with HEP for self management of symptoms.   IE: issued HEP  Current: Pt reports compliance. 2. Increase left knee PROM to 0-120 to increase ease with sit<>stand. IE: 0-113  Current: Met -1 to 133 degrees. Long Term Goals: To be accomplished in 4 weeks:  1. Improve FOTO to 68 to indicate improved function with daily activities.   IE: 40  Current: unable to reassess   2. Increase left knee AROM to 0-130 to increase ease with negotiating stairs. IE: 0-107  Current: unable to reassess   3. Increase left hip abd and ext strength to grossly 4/5 to improve stability for community ambulation. IE:3+/5  Current: unable to reassess   4. Increase left hamstring strength to 5/5 to improve ability to negotiate stairs. IE: 4/5  Current: unable to reassess   5. Increase left quad strength to 5/5 to increase ease with house cleaning.   IE:4+/5  Current: unable to reassess     Patient missed several appointments and requested to be discharged; unplanned D/C; unable to reassess goals.  ASSESSMENT/RECOMMENDATIONS:  [x]Discontinue therapy: []Patient has reached or is progressing toward set goals      [x]Patient is non-compliant or has abdicated      []Due to lack of appreciable progress towards set Ul. Leonardo Christine, PT 5/13/2022 11:36 AM

## 2022-06-01 DIAGNOSIS — E55.9 HYPOVITAMINOSIS D: ICD-10-CM

## 2022-06-03 ENCOUNTER — PATIENT MESSAGE (OUTPATIENT)
Dept: FAMILY MEDICINE CLINIC | Age: 47
End: 2022-06-03

## 2022-06-03 RX ORDER — ERGOCALCIFEROL 1.25 MG/1
CAPSULE ORAL
Qty: 12 CAPSULE | Refills: 0 | Status: SHIPPED | OUTPATIENT
Start: 2022-06-03 | End: 2022-09-01

## 2022-06-14 RX ORDER — ALBUTEROL SULFATE 90 UG/1
AEROSOL, METERED RESPIRATORY (INHALATION)
Qty: 18 G | Refills: 2 | Status: SHIPPED | OUTPATIENT
Start: 2022-06-14

## 2022-06-15 RX ORDER — ALBUTEROL SULFATE 90 UG/1
2 AEROSOL, METERED RESPIRATORY (INHALATION)
Qty: 1 EACH | Refills: 0 | OUTPATIENT
Start: 2022-06-15

## 2022-06-15 NOTE — TELEPHONE ENCOUNTER
Duplicate      For Pharmacy Admin Tracking Only     CPA in place:    Recommendation Provided To:    Intervention Detail: Discontinued Rx: 1, reason: Duplicate Therapy   Gap Closed?:    Intervention Accepted By:   Andriy Ferguson Time Spent (min): 5

## 2022-06-16 ENCOUNTER — E-VISIT (OUTPATIENT)
Dept: FAMILY MEDICINE CLINIC | Age: 47
End: 2022-06-16
Payer: COMMERCIAL

## 2022-06-16 DIAGNOSIS — J06.9 UPPER RESPIRATORY TRACT INFECTION, UNSPECIFIED TYPE: Primary | ICD-10-CM

## 2022-06-16 PROCEDURE — 99422 OL DIG E/M SVC 11-20 MIN: CPT | Performed by: NURSE PRACTITIONER

## 2022-06-16 RX ORDER — PROMETHAZINE HYDROCHLORIDE AND CODEINE PHOSPHATE 6.25; 1 MG/5ML; MG/5ML
5-10 SOLUTION ORAL
Qty: 200 ML | Refills: 0 | Status: SHIPPED | OUTPATIENT
Start: 2022-06-16 | End: 2022-06-21

## 2022-06-16 RX ORDER — FLUTICASONE FUROATE 100 UG/1
1 POWDER RESPIRATORY (INHALATION) DAILY
Qty: 30 EACH | Refills: 1 | Status: SHIPPED | OUTPATIENT
Start: 2022-06-16 | End: 2022-08-11

## 2022-06-17 NOTE — PROGRESS NOTES
Derian Herzog (1975) initiated an asynchronous digital communication through [employer]. HPI: per patient questionnaire     Exam: not applicable    Diagnoses and all orders for this visit:  Diagnoses and all orders for this visit:    1. Upper respiratory tract infection, unspecified type  -     fluticasone furoate (Arnuity Ellipta) 100 mcg/actuation dsdv inhaler; Take 1 Puff by inhalation daily. -     promethazine-codeine (PHENERGAN with CODEINE) 6.25-10 mg/5 mL syrup; Take 5-10 mL by mouth four (4) times daily as needed for Cough for up to 5 days. Max Daily Amount: 40 mL. Time: EV2 - 11-20 minutes were spent on the digital evaluation and management of this patient.        Bertin Baird NP

## 2022-06-27 NOTE — TELEPHONE ENCOUNTER
Duplicate        For Pharmacy Admin Tracking Only     CPA in place:    Recommendation Provided To:    Intervention Detail: Discontinued Rx: 1, reason: Duplicate Therapy   Gap Closed?:    Intervention Accepted By:   [facility] Time Spent (min): 5

## 2022-06-27 NOTE — TELEPHONE ENCOUNTER
Last Visit: 3/15/22 with NP Abimbola Magallanes  Next Appointment: none  Previous Refill Encounter(s): 10/5/21 #90 with 2 refills    Requested Prescriptions     Pending Prescriptions Disp Refills    valsartan (DIOVAN) 320 mg tablet [Pharmacy Med Name: VALSARTAN 320 MG TABLET] 90 Tablet 1     Sig: TAKE 1 TABLET BY Cailin in place:    Recommendation Provided To:    Intervention Detail: New Rx: 1, reason: Patient Preference   Gap Closed?:    Intervention Accepted By:   [place] Time Spent (min): 5

## 2022-06-28 RX ORDER — VALSARTAN 320 MG/1
320 TABLET ORAL DAILY
Qty: 90 TABLET | Refills: 1 | Status: SHIPPED | OUTPATIENT
Start: 2022-06-28

## 2022-06-29 RX ORDER — VALSARTAN 320 MG/1
320 TABLET ORAL DAILY
Qty: 90 TABLET | Refills: 2 | OUTPATIENT
Start: 2022-06-29

## 2022-08-09 DIAGNOSIS — J06.9 UPPER RESPIRATORY TRACT INFECTION, UNSPECIFIED TYPE: ICD-10-CM

## 2022-08-11 RX ORDER — FLUTICASONE FUROATE 100 UG/1
POWDER RESPIRATORY (INHALATION)
Qty: 30 EACH | Refills: 1 | Status: SHIPPED | OUTPATIENT
Start: 2022-08-11

## 2022-08-26 DIAGNOSIS — E55.9 HYPOVITAMINOSIS D: ICD-10-CM

## 2022-09-01 RX ORDER — ERGOCALCIFEROL 1.25 MG/1
CAPSULE ORAL
Qty: 12 CAPSULE | Refills: 0 | Status: SHIPPED | OUTPATIENT
Start: 2022-09-01

## 2022-09-07 DIAGNOSIS — Z79.4 TYPE 2 DIABETES MELLITUS WITH HYPERGLYCEMIA, WITH LONG-TERM CURRENT USE OF INSULIN (HCC): ICD-10-CM

## 2022-09-07 DIAGNOSIS — E11.65 TYPE 2 DIABETES MELLITUS WITH HYPERGLYCEMIA, WITH LONG-TERM CURRENT USE OF INSULIN (HCC): ICD-10-CM

## 2022-09-07 RX ORDER — FLASH GLUCOSE SENSOR
KIT MISCELLANEOUS
Qty: 2 KIT | Refills: 5 | Status: CANCELLED | OUTPATIENT
Start: 2022-09-07

## 2022-09-08 NOTE — TELEPHONE ENCOUNTER
Duplicate      For Pharmacy [facility] in place:   Recommendation Provided To:    Intervention Detail: Discontinued Rx: 1, reason: Duplicate Therapy  Gap Closed?:   Intervention Accepted By:   Time Spent (min): 5

## 2022-10-03 DIAGNOSIS — E11.65 TYPE 2 DIABETES MELLITUS WITH HYPERGLYCEMIA, WITH LONG-TERM CURRENT USE OF INSULIN (HCC): ICD-10-CM

## 2022-10-03 DIAGNOSIS — Z79.4 TYPE 2 DIABETES MELLITUS WITH HYPERGLYCEMIA, WITH LONG-TERM CURRENT USE OF INSULIN (HCC): ICD-10-CM

## 2022-10-03 NOTE — TELEPHONE ENCOUNTER
Last Visit: 3/15/22 with NP Bev Stovall  Next Appointment: none  Previous Refill Encounter(s): 21 Tenormin #90 with 2 refills, 22 Basaglar #5 with 1 refill    Requested Prescriptions     Pending Prescriptions Disp Refills    atenoloL (TENORMIN) 25 mg tablet 90 Tablet 2     Sig: Take 1 Tablet by mouth nightly. insulin glargine (Basaglar KwikPen U-100 Insulin) 100 unit/mL (3 mL) inpn 5 Pen 1     Si Units by SubCUTAneous route daily. For [facility] in place:   Recommendation Provided To:    Intervention Detail: New Rx: 2, reason: Patient Preference  Gap Closed?:   Intervention Accepted By:   Time Spent (min): 5

## 2022-10-04 RX ORDER — ATENOLOL 25 MG/1
25 TABLET ORAL
Qty: 90 TABLET | Refills: 2 | Status: SHIPPED | OUTPATIENT
Start: 2022-10-04

## 2022-10-04 RX ORDER — INSULIN GLARGINE 100 [IU]/ML
20 INJECTION, SOLUTION SUBCUTANEOUS DAILY
Qty: 5 PEN | Refills: 1 | Status: SHIPPED | OUTPATIENT
Start: 2022-10-04

## 2022-10-10 ENCOUNTER — E-VISIT (OUTPATIENT)
Dept: FAMILY MEDICINE CLINIC | Age: 47
End: 2022-10-10
Payer: COMMERCIAL

## 2022-10-10 DIAGNOSIS — J01.90 ACUTE SINUSITIS, RECURRENCE NOT SPECIFIED, UNSPECIFIED LOCATION: Primary | ICD-10-CM

## 2022-10-10 PROCEDURE — 99422 OL DIG E/M SVC 11-20 MIN: CPT | Performed by: NURSE PRACTITIONER

## 2022-10-19 RX ORDER — LEVOFLOXACIN 750 MG/1
750 TABLET ORAL DAILY
Qty: 5 TABLET | Refills: 0 | Status: SHIPPED | OUTPATIENT
Start: 2022-10-19 | End: 2022-10-24

## 2022-10-19 NOTE — PROGRESS NOTES
Jesus Oleary (1975) initiated an asynchronous digital communication through [employer]. HPI: per patient questionnaire     Exam: not applicable    Diagnoses and all orders for this visit:  Diagnoses and all orders for this visit:    1. Acute sinusitis, recurrence not specified, unspecified location    Other orders  -     levoFLOXacin (LEVAQUIN) 750 mg tablet; Take 1 Tablet by mouth daily for 5 days. Time: EV2 - 11-20 minutes were spent on the digital evaluation and management of this patient.        Jovany Newberry NP

## 2022-10-30 DIAGNOSIS — J00 ACUTE NASOPHARYNGITIS: Primary | ICD-10-CM

## 2022-10-30 RX ORDER — HYDROCODONE POLISTIREX AND CHLORPHENIRAMINE POLISTIREX 10; 8 MG/5ML; MG/5ML
5 SUSPENSION, EXTENDED RELEASE ORAL
Qty: 70 ML | Refills: 0 | Status: SHIPPED | OUTPATIENT
Start: 2022-10-30 | End: 2022-11-06

## 2022-11-03 DIAGNOSIS — R05.3 PERSISTENT COUGH FOR 3 WEEKS OR LONGER: Primary | ICD-10-CM

## 2022-11-03 NOTE — PROGRESS NOTES
Patient has been experiencing a persistent cough with auditory expiratory wheezing per patient for the last 5 weeks. Treated with Levaquin for acute sinusitis and also have been taking Arnuity Ellipita without improvement. Has also been taking otc medication and prescribed cough suppressant to no avail. Patient does have a history of exposure of 2nd hand smoke during childhood until age 17.

## 2022-11-10 ENCOUNTER — DOCUMENTATION ONLY (OUTPATIENT)
Dept: PULMONOLOGY | Age: 47
End: 2022-11-10

## 2022-11-11 DIAGNOSIS — G43.909 MIGRAINE WITHOUT STATUS MIGRAINOSUS, NOT INTRACTABLE, UNSPECIFIED MIGRAINE TYPE: ICD-10-CM

## 2022-11-15 RX ORDER — BUTALBITAL, ACETAMINOPHEN AND CAFFEINE 50; 325; 40 MG/1; MG/1; MG/1
TABLET ORAL
Qty: 60 TABLET | Refills: 2 | Status: SHIPPED | OUTPATIENT
Start: 2022-11-15

## 2022-11-17 ENCOUNTER — VIRTUAL VISIT (OUTPATIENT)
Dept: FAMILY MEDICINE CLINIC | Age: 47
End: 2022-11-17
Payer: COMMERCIAL

## 2022-11-17 DIAGNOSIS — G43.909 MIGRAINE WITHOUT STATUS MIGRAINOSUS, NOT INTRACTABLE, UNSPECIFIED MIGRAINE TYPE: Primary | ICD-10-CM

## 2022-11-17 PROCEDURE — 99213 OFFICE O/P EST LOW 20 MIN: CPT | Performed by: NURSE PRACTITIONER

## 2022-11-17 RX ORDER — PROPRANOLOL HYDROCHLORIDE 60 MG/1
60 CAPSULE, EXTENDED RELEASE ORAL DAILY
Qty: 30 CAPSULE | Refills: 2 | Status: SHIPPED | OUTPATIENT
Start: 2022-11-17

## 2022-11-17 NOTE — PROGRESS NOTES
Luis Fernando Gaxiola is a 56 y.o. female who was seen by synchronous (real-time) audio-video technology on 11/17/2022 for Migraine    Assessment & Plan:   Diagnoses and all orders for this visit:    1. Migraine without status migrainosus, not intractable, unspecified migraine type  -     REFERRAL TO NEUROLOGY    Other orders  -     propranolol LA (INDERAL LA) 60 mg SR capsule; Take 1 Capsule by mouth daily. Begin Inderal as above for migraines    Follow-up and Dispositions    Return in about 4 weeks (around 12/15/2022) for migaines since beginning medication, virtual follow up. Subjective:   Patient states she has had a migraine daily for the last 3 weeks. States yesterday she woke up with a migraine. States headaches are located to frontal/temporal location. Described as throbbing/aching in nature. She does have associated photophobia/phonophobia. Patient is taking fioricet with improvement has been taking 1-2 tabs 3-4 times per week. Prior to Admission medications    Medication Sig Start Date End Date Taking? Authorizing Provider   butalbital-acetaminophen-caffeine (FIORICET, ESGIC) -40 mg per tablet TAKE 1 TO 2 TABLETS BY MOUTH EVERY 4 TO 6 HOURS AS NEEDED FOR HEADACHE NOT TO EXCEED 6 TABLETS IN 24 HOURS 11/15/22  Yes Paul Claudio NP   SITagliptin (Januvia) 100 mg tablet TAKE 1 TABLET BY MOUTH EVERY DAY 11/10/22  Yes Kimberlee WISE NP   atenoloL (TENORMIN) 25 mg tablet Take 1 Tablet by mouth nightly. 10/4/22  Yes Paul Claudio NP   insulin glargine (Basaglar KwikPen U-100 Insulin) 100 unit/mL (3 mL) inpn 20 Units by SubCUTAneous route daily.  10/4/22  Yes Paul Claudio NP   FreeStyle Esmer 2 Sensor kit USE AS DIRECTED TO CHECK BLOOD SUGARS AT LEAST 3 TIMES DAILY. 9/9/22  Yes Paul Claudio NP   ergocalciferol (ERGOCALCIFEROL) 1,250 mcg (50,000 unit) capsule TAKE 1 CAPSULE BY MOUTH ONE TIME PER WEEK 9/1/22  Yes Paul Claudio NP   Arnuity Ellipta 100 mcg/actuation dsdv inhaler INHALE 1 PUFF EVERY DAY 8/11/22  Yes Helen Peguero NP   valsartan (DIOVAN) 320 mg tablet TAKE 1 TABLET BY MOUTH DAILY 6/28/22  Yes Helen Peguero NP   albuterol (PROVENTIL HFA, VENTOLIN HFA, PROAIR HFA) 90 mcg/actuation inhaler TAKE 2 PUFFS BY INHALATION EVERY FOUR HOURS AS NEEDED FOR WHEEZING OR SHORTNESS OF BREATH. 6/14/22  Yes Alex Farias T, NP   Stephon Siegel 10 mg tab tablet Take 10 mg by mouth daily. 3/8/22  Yes Provider, Historical   glucose blood VI test strips (OneTouch Verio test strips) strip USE TO CHECK GLUCOSE THREE TIMES DAILY 1/18/22  Yes Helen Peguero NP   inhalational spacing device (Aerochamber MV) 1 Each by Does Not Apply route as needed for Wheezing. 1/5/22  Yes Helen Peguero NP   rosuvastatin (CRESTOR) 20 mg tablet Take 1 Tablet by mouth nightly. 12/16/21  Yes Helen Peguero NP   Insulin Needles, Disposable, (Miranda Pen Needle) 32 gauge x 5/32\" ndle USE ONCE DAILY WITH BASAGLAR 10/21/21  Yes Helen Peguero NP   flash glucose scanning reader (B[employer] NOEMY 14 DAY READER) misc To use with noemy sensor.  Check glucose 3 times a day 4/16/19  Yes Helen Peguero NP   clotrimazole-betamethasone (LOTRISONE) topical cream Apply to affected area every 12 hrs x2 weeks  Patient not taking: Reported on 3/15/2022 12/13/19   Helen Peguero NP     Patient Active Problem List   Diagnosis Code    Diabetes mellitus, type II ([address] E11.9    Hyperlipidemia E78.5    ADHD (attention deficit hyperactivity disorder) F90.9    Severe obesity ([address] E66.01     Patient Active Problem List    Diagnosis Date Noted    Severe obesity ([address] 02/25/2020    ADHD (attention deficit hyperactivity disorder)     Diabetes mellitus, type II ([address]     Hyperlipidemia      Current Outpatient Medications   Medication Sig Dispense Refill    butalbital-acetaminophen-caffeine (FIORICET, ESGIC) -40 mg per tablet TAKE 1 TO 2 TABLETS BY MOUTH EVERY 4 TO 6 HOURS AS NEEDED FOR HEADACHE NOT TO EXCEED 6 TABLETS IN 24 HOURS 60 Tablet 2 SITagliptin (Januvia) 100 mg tablet TAKE 1 TABLET BY MOUTH EVERY DAY 90 Tablet 2    atenoloL (TENORMIN) 25 mg tablet Take 1 Tablet by mouth nightly. 90 Tablet 2    insulin glargine (Basaglar KwikPen U-100 Insulin) 100 unit/mL (3 mL) inpn 20 Units by SubCUTAneous route daily. 5 Pen 1    FreeStyle Esmer 2 Sensor kit USE AS DIRECTED TO CHECK BLOOD SUGARS AT LEAST 3 TIMES DAILY. 2 Kit 11    ergocalciferol (ERGOCALCIFEROL) 1,250 mcg (50,000 unit) capsule TAKE 1 CAPSULE BY MOUTH ONE TIME PER WEEK 12 Capsule 0    Arnuity Ellipta 100 mcg/actuation dsdv inhaler INHALE 1 PUFF EVERY DAY 30 Each 1    valsartan (DIOVAN) 320 mg tablet TAKE 1 TABLET BY MOUTH DAILY 90 Tablet 1    albuterol (PROVENTIL HFA, VENTOLIN HFA, PROAIR HFA) 90 mcg/actuation inhaler TAKE 2 PUFFS BY INHALATION EVERY FOUR HOURS AS NEEDED FOR WHEEZING OR SHORTNESS OF BREATH. 18 g 2    Farxiga 10 mg tab tablet Take 10 mg by mouth daily. glucose blood VI test strips (OneTouch Verio test strips) strip USE TO CHECK GLUCOSE THREE TIMES DAILY 300 Strip 3    inhalational spacing device (Aerochamber MV) 1 Each by Does Not Apply route as needed for Wheezing. 1 Each 0    rosuvastatin (CRESTOR) 20 mg tablet Take 1 Tablet by mouth nightly. 90 Tablet 3    Insulin Needles, Disposable, (Miranda Pen Needle) 32 gauge x 5/32\" ndle USE ONCE DAILY WITH BASAGLAR 100 Pen Needle 5    flash glucose scanning reader ([employer] Media[employer] ESMER 14 DAY READER) misc To use with esmer sensor.  Check glucose 3 times a day 2 Each 5    clotrimazole-betamethasone (LOTRISONE) topical cream Apply to affected area every 12 hrs x2 weeks (Patient not taking: Reported on 3/15/2022) 15 g 1     Allergies   Allergen Reactions    Lisinopril Cough    Nsaids (Non-Steroidal Anti-Inflammatory Drug) Other (comments)     Polycystic kidney disease     Past Medical History:   Diagnosis Date    ADHD (attention deficit hyperactivity disorder)     Diabetes [address]     Diabetes mellitus, type II ([facility]     History of echocardiogram 2015    EF 60-65%. No RWMA. Mild conc LVH. No significant valvular heart disease. History of uterine fibroid     Holter monitor, normal 2015    Sinus rhythm, avg HR 99 bpm (range  bpm). Benign Holter study. Hyperlipidemia     LLE venous duplex 2015    Left leg:  No DVT. Polycystic kidney disease     Psychiatric disorder      Past Surgical History:   Procedure Laterality Date    HX BREAST BIOPSY Right     HX GYN      hysterectomy    HX HYSTERECTOMY          HX ORTHOPAEDIC  2014    knee    HX WISDOM TEETH EXTRACTION      [place] ANESTH,SURGERY OF SHOULDER Left 2018    ROTATOR CUFF REPAIR     Family History   Problem Relation Age of Onset    Diabetes Other     Heart Disease Other     Hypertension Other     Hypertension Mother     Diabetes Mother     High Cholesterol Mother     Diabetes Father     Hypertension Father     High Cholesterol Father      Social History     Tobacco Use    Smoking status: Former     Types: Cigarettes     Quit date: 2000     Years since quittin.8    Smokeless tobacco: Never   Substance Use Topics    Alcohol use: Yes     Comment: 2 drinks a week       ROS    Objective:   No flowsheet data found. General: alert, cooperative, no distress   Mental  status: normal mood, behavior, speech, dress, motor activity, and thought processes, able to follow commands   HENT: NCAT   Neck: no visualized mass   Resp: no respiratory distress   Neuro: no gross deficits   Skin: no discoloration or lesions of concern on visible areas   Psychiatric: normal affect, consistent with stated mood, no evidence of hallucinations     Additional exam findings: We discussed the expected course, resolution and complications of the diagnosis(es) in detail. Medication risks, benefits, costs, interactions, and alternatives were discussed as indicated. I advised her to contact the office if her condition worsens, changes or fails to improve as anticipated. She expressed understanding with the diagnosis(es) and plan. Jacobo Valenzuela, was evaluated through a synchronous (real-time) audio-video encounter. The patient (or guardian if applicable) is aware that this is a billable service, which includes applicable co-pays. This Virtual Visit was conducted with patient's (and/or legal guardian's) consent. The visit was conducted pursuant to the emergency declaration under the [address] [place] [address] authority and the [employer] General Act. Patient identification was verified, and a caregiver was present when appropriate. The patient was located at: Home: [address] [address]  The provider was located at:  Facility (Appt Department): [address] [place] [address] Amagon KYLE Jama

## 2022-11-17 NOTE — PROGRESS NOTES
Michelle Ocasio (: 1975) is a 56 y.o. female, established patient, here for evaluation of the following chief complaint(s):   Migraine             Teneka D Rosa Maria Rebollar, was evaluated through a synchronous (real-time) audio-video encounter. The patient (or guardian if applicable) is aware that this is a billable service, which includes applicable co-pays. This Virtual Visit was conducted with patient's (and/or legal guardian's) consent. The visit was conducted pursuant to the emergency declaration under the [address] [place] [address], [address] [place] [address] authority and the [employer] General Act. Patient identification was verified, and a caregiver was present when appropriate. The patient was located at: Home: [address]  [facility] [address]  The provider was located at: Facility (Appt Department): [address]  [address] [facility] access hospital was used to authenticate this note.   -- Reanna Patricio

## 2022-11-18 DIAGNOSIS — E55.9 HYPOVITAMINOSIS D: ICD-10-CM

## 2022-11-21 RX ORDER — ERGOCALCIFEROL 1.25 MG/1
CAPSULE ORAL
Qty: 12 CAPSULE | Refills: 0 | Status: SHIPPED | OUTPATIENT
Start: 2022-11-21

## 2022-12-15 ENCOUNTER — VIRTUAL VISIT (OUTPATIENT)
Dept: FAMILY MEDICINE CLINIC | Age: 47
End: 2022-12-15

## 2022-12-15 NOTE — PROGRESS NOTES
This encounter was created in error - please disregard. Patient will call to reschedule appointment.

## 2022-12-23 RX ORDER — GLIPIZIDE 10 MG/1
10 TABLET, FILM COATED, EXTENDED RELEASE ORAL DAILY
Qty: 90 TABLET | Refills: 1 | Status: SHIPPED | OUTPATIENT
Start: 2022-12-23

## 2023-01-12 ENCOUNTER — TRANSCRIBE ORDER (OUTPATIENT)
Dept: SCHEDULING | Age: 48
End: 2023-01-12

## 2023-01-12 DIAGNOSIS — E11.22 TYPE 2 DIABETES MELLITUS WITH DIABETIC CHRONIC KIDNEY DISEASE (HCC): Primary | ICD-10-CM

## 2023-02-25 ENCOUNTER — HOSPITAL ENCOUNTER (OUTPATIENT)
Facility: HOSPITAL | Age: 48
Discharge: HOME | End: 2023-02-28
Payer: COMMERCIAL

## 2023-02-25 LAB
ANION GAP SERPL CALC-SCNC: 6 MMOL/L (ref 3–18)
BUN SERPL-MCNC: 20 MG/DL (ref 7–18)
BUN/CREAT SERPL: 24 (ref 12–20)
CALCIUM SERPL-MCNC: 9.5 MG/DL (ref 8.5–10.1)
CHLORIDE SERPL-SCNC: 107 MMOL/L (ref 100–111)
CHOLEST SERPL-MCNC: 289 MG/DL
CO2 SERPL-SCNC: 24 MMOL/L (ref 21–32)
CREAT SERPL-MCNC: 0.83 MG/DL (ref 0.6–1.3)
CREAT UR-MCNC: 58 MG/DL (ref 30–125)
GLUCOSE SERPL-MCNC: 127 MG/DL (ref 74–99)
HBA1C MFR BLD: 9.2 % (ref 4.2–5.6)
HDLC SERPL-MCNC: 48 MG/DL (ref 40–60)
HDLC SERPL: 6 (ref 0–5)
LDLC SERPL CALC-MCNC: 202.8 MG/DL (ref 0–100)
LIPID PANEL: ABNORMAL
MICROALBUMIN UR-MCNC: 193 MG/DL (ref 0–3)
MICROALBUMIN/CREAT UR-RTO: 3328 MG/G (ref 0–30)
POTASSIUM SERPL-SCNC: 4.2 MMOL/L (ref 3.5–5.5)
SODIUM SERPL-SCNC: 137 MMOL/L (ref 136–145)
TRIGL SERPL-MCNC: 191 MG/DL
TSH SERPL DL<=0.05 MIU/L-ACNC: 2.92 UIU/ML (ref 0.36–3.74)
VLDLC SERPL CALC-MCNC: 38.2 MG/DL

## 2023-02-25 PROCEDURE — 36415 COLL VENOUS BLD VENIPUNCTURE: CPT

## 2023-02-25 PROCEDURE — 83036 HEMOGLOBIN GLYCOSYLATED A1C: CPT

## 2023-02-25 PROCEDURE — 84443 ASSAY THYROID STIM HORMONE: CPT

## 2023-02-25 PROCEDURE — 80048 BASIC METABOLIC PNL TOTAL CA: CPT

## 2023-02-25 PROCEDURE — 82570 ASSAY OF URINE CREATININE: CPT

## 2023-02-25 PROCEDURE — 80061 LIPID PANEL: CPT

## 2023-03-08 DIAGNOSIS — E11.65 TYPE 2 DIABETES MELLITUS WITH HYPERGLYCEMIA (HCC): ICD-10-CM

## 2023-03-08 RX ORDER — INSULIN GLARGINE 100 [IU]/ML
INJECTION, SOLUTION SUBCUTANEOUS
Qty: 5 ADJUSTABLE DOSE PRE-FILLED PEN SYRINGE | Refills: 2 | Status: SHIPPED | OUTPATIENT
Start: 2023-03-08

## 2023-03-08 NOTE — TELEPHONE ENCOUNTER
Last Appointment- 12/15/22    Next Appointment- None    - N/A    Urine Drug Screen- N/A    Controlled Substance Agreement- N/A    Requested Prescriptions     Pending Prescriptions Disp Refills    BASAGLAR KWIKPEN 100 UNIT/ML injection pen [Pharmacy Med Name: BASAGLAR 100 UNIT/ML KWIKPEN]  1     Sig: INJECT 20 UNITS UNDER THE SKIN ONCE DAILY

## 2023-03-13 ENCOUNTER — TRANSCRIBE ORDERS (OUTPATIENT)
Facility: HOSPITAL | Age: 48
End: 2023-03-13

## 2023-03-13 DIAGNOSIS — R92.8 ABNORMALITY OF LEFT BREAST ON SCREENING MAMMOGRAM: ICD-10-CM

## 2023-03-13 DIAGNOSIS — Z12.31 VISIT FOR SCREENING MAMMOGRAM: Primary | ICD-10-CM

## 2023-03-15 ENCOUNTER — HOSPITAL ENCOUNTER (OUTPATIENT)
Facility: HOSPITAL | Age: 48
Discharge: HOME | End: 2023-03-18
Payer: COMMERCIAL

## 2023-03-15 DIAGNOSIS — Z12.31 VISIT FOR SCREENING MAMMOGRAM: ICD-10-CM

## 2023-03-15 DIAGNOSIS — E11.22 TYPE 2 DIABETES MELLITUS WITH DIABETIC CHRONIC KIDNEY DISEASE, UNSPECIFIED CKD STAGE, UNSPECIFIED WHETHER LONG TERM INSULIN USE (HCC): ICD-10-CM

## 2023-03-15 PROCEDURE — 77063 BREAST TOMOSYNTHESIS BI: CPT

## 2023-03-15 PROCEDURE — 76770 US EXAM ABDO BACK WALL COMP: CPT

## 2023-03-16 DIAGNOSIS — E55.9 VITAMIN D DEFICIENCY, UNSPECIFIED: ICD-10-CM

## 2023-03-17 RX ORDER — ERGOCALCIFEROL 1.25 MG/1
CAPSULE ORAL
Qty: 12 CAPSULE | Refills: 1 | Status: SHIPPED | OUTPATIENT
Start: 2023-03-17

## 2023-04-03 PROBLEM — E11.21 TYPE 2 DIABETES WITH NEPHROPATHY (HCC): Status: RESOLVED | Noted: 2018-05-02 | Resolved: 2022-01-05

## 2023-04-06 ENCOUNTER — HOSPITAL ENCOUNTER (OUTPATIENT)
Facility: HOSPITAL | Age: 48
End: 2023-04-06
Payer: COMMERCIAL

## 2023-04-06 ENCOUNTER — HOSPITAL ENCOUNTER (OUTPATIENT)
Facility: HOSPITAL | Age: 48
Discharge: HOME | End: 2023-04-06
Payer: COMMERCIAL

## 2023-04-06 DIAGNOSIS — R92.8 ABNORMALITY OF LEFT BREAST ON SCREENING MAMMOGRAM: ICD-10-CM

## 2023-04-06 PROCEDURE — 76642 ULTRASOUND BREAST LIMITED: CPT

## 2023-04-06 PROCEDURE — G0279 TOMOSYNTHESIS, MAMMO: HCPCS

## 2023-05-15 ENCOUNTER — TELEMEDICINE (OUTPATIENT)
Age: 48
End: 2023-05-15
Payer: COMMERCIAL

## 2023-05-15 DIAGNOSIS — J02.9 ACUTE PHARYNGITIS, UNSPECIFIED ETIOLOGY: Primary | ICD-10-CM

## 2023-05-15 PROCEDURE — 99213 OFFICE O/P EST LOW 20 MIN: CPT | Performed by: NURSE PRACTITIONER

## 2023-05-15 RX ORDER — LIDOCAINE HYDROCHLORIDE 20 MG/ML
15 SOLUTION OROPHARYNGEAL
Qty: 100 ML | Refills: 0 | Status: SHIPPED | OUTPATIENT
Start: 2023-05-15

## 2023-05-15 RX ORDER — AMOXICILLIN 875 MG/1
875 TABLET, COATED ORAL 2 TIMES DAILY
Qty: 20 TABLET | Refills: 0 | Status: SHIPPED | OUTPATIENT
Start: 2023-05-15 | End: 2023-05-25

## 2023-05-15 RX ORDER — FLUCONAZOLE 150 MG/1
150 TABLET ORAL WEEKLY
Qty: 2 TABLET | Refills: 0 | Status: SHIPPED | OUTPATIENT
Start: 2023-05-15 | End: 2023-05-16 | Stop reason: SDUPTHER

## 2023-05-18 RX ORDER — FLUCONAZOLE 150 MG/1
150 TABLET ORAL
Qty: 3 TABLET | Refills: 5 | Status: SHIPPED | OUTPATIENT
Start: 2023-05-18 | End: 2023-05-25

## 2023-05-26 RX ORDER — SEMAGLUTIDE 1.34 MG/ML
0.5 INJECTION, SOLUTION SUBCUTANEOUS
Qty: 1 ADJUSTABLE DOSE PRE-FILLED PEN SYRINGE | Refills: 5 | OUTPATIENT
Start: 2023-05-26

## 2023-05-27 ENCOUNTER — TELEPHONE (OUTPATIENT)
Age: 48
End: 2023-05-27

## 2023-05-27 RX ORDER — SEMAGLUTIDE 1.34 MG/ML
0.5 INJECTION, SOLUTION SUBCUTANEOUS
Qty: 1 ADJUSTABLE DOSE PRE-FILLED PEN SYRINGE | Refills: 5 | Status: SHIPPED | OUTPATIENT
Start: 2023-05-27

## 2023-06-27 ENCOUNTER — PATIENT MESSAGE (OUTPATIENT)
Age: 48
End: 2023-06-27

## 2023-06-27 DIAGNOSIS — E55.9 VITAMIN D DEFICIENCY, UNSPECIFIED: ICD-10-CM

## 2023-06-27 DIAGNOSIS — Z79.4 TYPE 2 DIABETES MELLITUS WITH HYPERGLYCEMIA, WITH LONG-TERM CURRENT USE OF INSULIN (HCC): Primary | ICD-10-CM

## 2023-06-27 DIAGNOSIS — E11.65 TYPE 2 DIABETES MELLITUS WITH HYPERGLYCEMIA, WITH LONG-TERM CURRENT USE OF INSULIN (HCC): Primary | ICD-10-CM

## 2023-06-27 DIAGNOSIS — E11.65 TYPE 2 DIABETES MELLITUS WITH HYPERGLYCEMIA (HCC): ICD-10-CM

## 2023-06-27 RX ORDER — ROSUVASTATIN CALCIUM 20 MG/1
20 TABLET, COATED ORAL DAILY
Qty: 90 TABLET | Refills: 3 | Status: SHIPPED | OUTPATIENT
Start: 2023-06-27

## 2023-06-27 RX ORDER — INSULIN GLARGINE 100 [IU]/ML
30 INJECTION, SOLUTION SUBCUTANEOUS DAILY
Qty: 5 ADJUSTABLE DOSE PRE-FILLED PEN SYRINGE | Refills: 5 | Status: CANCELLED | OUTPATIENT
Start: 2023-06-27

## 2023-06-27 RX ORDER — AMLODIPINE BESYLATE 10 MG/1
10 TABLET ORAL DAILY
Qty: 90 TABLET | Refills: 3 | Status: SHIPPED | OUTPATIENT
Start: 2023-06-27

## 2023-06-27 RX ORDER — VALSARTAN 320 MG/1
320 TABLET ORAL DAILY
Qty: 90 TABLET | Refills: 3 | Status: SHIPPED | OUTPATIENT
Start: 2023-06-27

## 2023-06-27 RX ORDER — ERGOCALCIFEROL 1.25 MG/1
CAPSULE ORAL
Qty: 12 CAPSULE | Refills: 3 | Status: SHIPPED | OUTPATIENT
Start: 2023-06-27

## 2023-06-27 RX ORDER — VALSARTAN 320 MG/1
320 TABLET ORAL DAILY
Qty: 90 TABLET | Refills: 3 | Status: CANCELLED | OUTPATIENT
Start: 2023-06-27

## 2023-06-27 RX ORDER — PROPRANOLOL HCL 60 MG
60 CAPSULE, EXTENDED RELEASE 24HR ORAL DAILY
Qty: 90 CAPSULE | Refills: 3 | Status: CANCELLED | OUTPATIENT
Start: 2023-06-27

## 2023-06-27 RX ORDER — SEMAGLUTIDE 1.34 MG/ML
1 INJECTION, SOLUTION SUBCUTANEOUS
Qty: 3 ML | Refills: 5 | Status: SHIPPED | OUTPATIENT
Start: 2023-06-27

## 2023-06-27 RX ORDER — PROPRANOLOL HCL 60 MG
60 CAPSULE, EXTENDED RELEASE 24HR ORAL DAILY
Qty: 90 CAPSULE | Refills: 3 | Status: SHIPPED | OUTPATIENT
Start: 2023-06-27

## 2023-06-27 RX ORDER — INSULIN GLARGINE 100 [IU]/ML
30 INJECTION, SOLUTION SUBCUTANEOUS DAILY
Qty: 5 ADJUSTABLE DOSE PRE-FILLED PEN SYRINGE | Refills: 5 | Status: SHIPPED | OUTPATIENT
Start: 2023-06-27

## 2023-06-27 RX ORDER — AMLODIPINE BESYLATE 10 MG/1
10 TABLET ORAL DAILY
COMMUNITY
Start: 2023-03-20 | End: 2023-06-27 | Stop reason: SDUPTHER

## 2023-06-27 RX ORDER — ERGOCALCIFEROL 1.25 MG/1
CAPSULE ORAL
Qty: 12 CAPSULE | Refills: 3 | Status: CANCELLED | OUTPATIENT
Start: 2023-06-27

## 2023-08-14 DIAGNOSIS — U07.1 POSITIVE SELF-ADMINISTERED ANTIGEN TEST FOR COVID-19: Primary | ICD-10-CM

## 2023-08-14 RX ORDER — HYDROCODONE POLISTIREX AND CHLORPHENIRAMINE POLISTIREX 10; 8 MG/5ML; MG/5ML
5 SUSPENSION, EXTENDED RELEASE ORAL EVERY 12 HOURS PRN
Qty: 70 ML | Refills: 0 | Status: SHIPPED | OUTPATIENT
Start: 2023-08-14 | End: 2023-08-21

## 2023-10-26 ENCOUNTER — TELEPHONE (OUTPATIENT)
Age: 48
End: 2023-10-26

## 2023-10-26 RX ORDER — GLIPIZIDE 10 MG/1
10 TABLET, FILM COATED, EXTENDED RELEASE ORAL DAILY
Qty: 90 TABLET | Refills: 3 | Status: SHIPPED | OUTPATIENT
Start: 2023-10-26

## 2024-01-07 DIAGNOSIS — E11.65 TYPE 2 DIABETES MELLITUS WITH HYPERGLYCEMIA (HCC): ICD-10-CM

## 2024-01-08 RX ORDER — INSULIN GLARGINE 100 [IU]/ML
INJECTION, SOLUTION SUBCUTANEOUS
Qty: 15 ADJUSTABLE DOSE PRE-FILLED PEN SYRINGE | Refills: 5 | Status: SHIPPED | OUTPATIENT
Start: 2024-01-08

## 2024-01-08 NOTE — TELEPHONE ENCOUNTER
Last Visit: 05- VV   Next Appointment: none  Previous Refill Encounter: 06- #5 adjustable dose [id number] pen syringe with 5 refills

## 2024-02-05 ENCOUNTER — PATIENT MESSAGE (OUTPATIENT)
Age: 49
End: 2024-02-05

## 2024-02-05 NOTE — TELEPHONE ENCOUNTER
Last Appointment- 23    Next Appointment- None    Previous Refill Encounter(s): Date: 10/21/21 #100 Refills 5    Requested Prescriptions     Pending Prescriptions Disp Refills    Insulin Pen Needle 32G X 5 MM MISC 100 each      Si each by Does not apply route daily

## 2024-02-05 NOTE — TELEPHONE ENCOUNTER
From: Malgorzata Kerr  To: Johana Nielson  Sent: 2/5/2024 6:15 AM EST  Subject: Pen needles    Good morning,    I am in need of a refill of pen needles to use with Basaglar. I was unable to locate them on my list of medications.     Thank you

## 2024-02-14 RX ORDER — PROPRANOLOL HCL 60 MG
CAPSULE, EXTENDED RELEASE 24HR ORAL DAILY
Qty: 90 CAPSULE | Refills: 3 | Status: SHIPPED | OUTPATIENT
Start: 2024-02-14

## 2024-02-14 RX ORDER — VALSARTAN 320 MG/1
320 TABLET ORAL DAILY
Qty: 90 TABLET | Refills: 3 | Status: SHIPPED | OUTPATIENT
Start: 2024-02-14

## 2024-02-14 NOTE — TELEPHONE ENCOUNTER
Last Appointment- 5/15/23    Next Appointment- None    Previous Refill Encounter(s) Valsartan: Date: 6/27/23 #90 Refills 3  Previous Refill Encounter(s) Propanolol: Date: 6/27/23 #90 Refills 3    Requested Prescriptions     Pending Prescriptions Disp Refills    valsartan (DIOVAN) 320 MG tablet [Pharmacy Med Name: VALSARTAN 320 MG TABLET] 90 tablet 3     Sig: TAKE 1 TABLET BY MOUTH EVERY DAY    propranolol (INDERAL LA) 60 MG extended release capsule [Pharmacy Med Name: PROPRANOLOL ER 60 MG CAPSULE] 90 capsule 3     Sig: TAKE 1 CAPSULE BY MOUTH EVERY DAY

## 2024-04-09 DIAGNOSIS — E55.9 VITAMIN D DEFICIENCY, UNSPECIFIED: ICD-10-CM

## 2024-04-10 ENCOUNTER — APPOINTMENT (OUTPATIENT)
Facility: HOSPITAL | Age: 49
End: 2024-04-10
Attending: EMERGENCY MEDICINE
Payer: COMMERCIAL

## 2024-04-10 ENCOUNTER — HOSPITAL ENCOUNTER (EMERGENCY)
Facility: HOSPITAL | Age: 49
Discharge: HOME | End: 2024-04-10
Attending: EMERGENCY MEDICINE
Payer: COMMERCIAL

## 2024-04-10 ENCOUNTER — APPOINTMENT (OUTPATIENT)
Facility: HOSPITAL | Age: 49
End: 2024-04-10
Payer: COMMERCIAL

## 2024-04-10 VITALS
TEMPERATURE: 98.2 F | DIASTOLIC BLOOD PRESSURE: 65 MMHG | OXYGEN SATURATION: 100 % | HEIGHT: 66 IN | RESPIRATION RATE: 20 BRPM | HEART RATE: 74 BPM | BODY MASS INDEX: 36.96 KG/M2 | WEIGHT: 230 LBS | SYSTOLIC BLOOD PRESSURE: 128 MMHG

## 2024-04-10 DIAGNOSIS — M79.604 BILATERAL LEG PAIN: ICD-10-CM

## 2024-04-10 DIAGNOSIS — S83.262S: ICD-10-CM

## 2024-04-10 DIAGNOSIS — M79.605 BILATERAL LEG PAIN: ICD-10-CM

## 2024-04-10 DIAGNOSIS — S83.261A: ICD-10-CM

## 2024-04-10 DIAGNOSIS — S83.203A: Primary | ICD-10-CM

## 2024-04-10 PROCEDURE — 73562 X-RAY EXAM OF KNEE 3: CPT

## 2024-04-10 PROCEDURE — 99283 EMERGENCY DEPT VISIT LOW MDM: CPT

## 2024-04-10 PROCEDURE — 6370000000 HC RX 637 (ALT 250 FOR IP): Performed by: EMERGENCY MEDICINE

## 2024-04-10 PROCEDURE — 93971 EXTREMITY STUDY: CPT

## 2024-04-10 RX ORDER — ERGOCALCIFEROL 1.25 MG/1
CAPSULE ORAL
Qty: 12 CAPSULE | Refills: 3 | Status: SHIPPED | OUTPATIENT
Start: 2024-04-10

## 2024-04-10 RX ORDER — OXYCODONE HYDROCHLORIDE AND ACETAMINOPHEN 5; 325 MG/1; MG/1
1 TABLET ORAL EVERY 6 HOURS PRN
Qty: 12 TABLET | Refills: 0 | Status: SHIPPED | OUTPATIENT
Start: 2024-04-10 | End: 2024-04-13

## 2024-04-10 RX ORDER — OXYCODONE HYDROCHLORIDE AND ACETAMINOPHEN 5; 325 MG/1; MG/1
2 TABLET ORAL
Status: COMPLETED | OUTPATIENT
Start: 2024-04-10 | End: 2024-04-10

## 2024-04-10 RX ADMIN — OXYCODONE HYDROCHLORIDE AND ACETAMINOPHEN 2 TABLET: 5; 325 TABLET ORAL at 22:22

## 2024-04-10 ASSESSMENT — ENCOUNTER SYMPTOMS
RESPIRATORY NEGATIVE: 1
GASTROINTESTINAL NEGATIVE: 1

## 2024-04-10 ASSESSMENT — LIFESTYLE VARIABLES
HOW MANY STANDARD DRINKS CONTAINING ALCOHOL DO YOU HAVE ON A TYPICAL DAY: PATIENT DOES NOT DRINK
HOW OFTEN DO YOU HAVE A DRINK CONTAINING ALCOHOL: NEVER

## 2024-04-10 ASSESSMENT — PAIN - FUNCTIONAL ASSESSMENT: PAIN_FUNCTIONAL_ASSESSMENT: 0-10

## 2024-04-10 ASSESSMENT — PAIN SCALES - GENERAL: PAINLEVEL_OUTOF10: 10

## 2024-04-10 NOTE — ED TRIAGE NOTES
Patient arrived to Er with complaints of right knee pain. Patient states pain has been for 2 weeks, today when she stood up heard a pop and felt her knee give out. Patient states she is unable to put any weight on her leg due to pain.

## 2024-04-11 LAB — ECHO BSA: 2.2 M2

## 2024-04-11 NOTE — ED PROVIDER NOTES
CURRENTMEDICATIONS       Previous Medications    ALBUTEROL SULFATE HFA (PROVENTIL;VENTOLIN;PROAIR) 108 (90 BASE) MCG/ACT INHALER    TAKE 2 PUFFS BY INHALATION EVERY FOUR HOURS AS NEEDED FOR WHEEZING OR SHORTNESS OF BREATH.    AMLODIPINE (NORVASC) 10 MG TABLET    Take 1 tablet by mouth daily    BUTALBITAL-ACETAMINOPHEN-CAFFEINE (FIORICET, ESGIC) -40 MG PER TABLET    TAKE 1 TO 2 TABLETS BY MOUTH EVERY 4 TO 6 HOURS AS NEEDED FOR HEADACHE NOT TO EXCEED 6 TABLETS IN 24 HOURS    CLOTRIMAZOLE-BETAMETHASONE (LOTRISONE) 1-0.05 % CREAM    Apply to affected area every 12 hrs x2 weeks    CONTINUOUS BLOOD GLUC SENSOR (FREESTYLE EVA 2 SENSOR) MISC    USE AS DIRECTED TO CHECK BLOOD SUGARS AT LEAST 3 TIMES DAILY.    DAPAGLIFLOZIN (FARXIGA) 10 MG TABLET    Take 1 tablet by mouth daily    FLUTICASONE (ARNUITY ELLIPTA) 100 MCG/ACT AEPB    INHALE 1 PUFF EVERY DAY    GLIPIZIDE (GLUCOTROL XL) 10 MG EXTENDED RELEASE TABLET    Take 1 tablet by mouth daily    INSULIN GLARGINE (BASAGLAR KWIKPEN) 100 UNIT/ML INJECTION PEN    INJECT 20 UNITS SUBCUTANEOUSLY EVERY DAY    INSULIN PEN NEEDLE 32G X 5 MM MISC    1 each by Does not apply route daily    LIDOCAINE VISCOUS HCL (XYLOCAINE) 2 % SOLN SOLUTION    Take 15 mLs by mouth every 3 hours as needed for Pain Swish and swallow, not to exceed 8 doses in 24 hours    NIRMATRELVIR/RITONAVIR 300/100 (PAXLOVID) 20 X 150 MG & 10 X 100MG TBPK    Take 3 tablets (two 150 mg nirmatrelvir and one 100 mg ritonavir tablets) by mouth every 12 hours for 5 days.    PROPRANOLOL (INDERAL LA) 60 MG EXTENDED RELEASE CAPSULE    TAKE 1 CAPSULE BY MOUTH EVERY DAY    ROSUVASTATIN (CRESTOR) 20 MG TABLET    Take 1 tablet by mouth daily    SEMAGLUTIDE, 1 MG/DOSE, (OZEMPIC, 1 MG/DOSE,) 4 MG/3ML SOPN    Inject 1 mg into the skin every 7 days    VALSARTAN (DIOVAN) 320 MG TABLET    TAKE 1 TABLET BY MOUTH EVERY DAY       ALLERGIES     Lisinopril and Nsaids    FAMILY HISTORY       Family History   Problem Relation Age

## 2024-04-11 NOTE — ED NOTES
Discharge teaching provided to pt regarding treatment received, medications prescribed, and follow-up care. Pt verbalized understanding directions and follow up care. Pt left in wheelchair with knee Immobilizer with discharge paperwork in hand.  drove pt home.

## 2024-04-12 ENCOUNTER — OFFICE VISIT (OUTPATIENT)
Age: 49
End: 2024-04-12
Payer: COMMERCIAL

## 2024-04-12 VITALS — HEIGHT: 66 IN | WEIGHT: 230 LBS | BODY MASS INDEX: 36.96 KG/M2

## 2024-04-12 DIAGNOSIS — M25.561 ACUTE PAIN OF RIGHT KNEE: ICD-10-CM

## 2024-04-12 DIAGNOSIS — S83.91XA SPRAIN OF RIGHT KNEE, UNSPECIFIED LIGAMENT, INITIAL ENCOUNTER: ICD-10-CM

## 2024-04-12 DIAGNOSIS — M22.41 CHONDROMALACIA, PATELLA, RIGHT: ICD-10-CM

## 2024-04-12 DIAGNOSIS — M17.11 UNILATERAL PRIMARY OSTEOARTHRITIS, RIGHT KNEE: Primary | ICD-10-CM

## 2024-04-12 PROCEDURE — 99214 OFFICE O/P EST MOD 30 MIN: CPT | Performed by: SPECIALIST

## 2024-04-12 NOTE — PROGRESS NOTES
Patient: Malgorzata Kerr                MRN: [id number]       SSN: [id number]  YOB: 1975        AGE: 49 y.o.        SEX: female      PCP: Johana Nielson APRN - NP  04/12/24    Chief Complaint   Patient presents with    Knee Pain     Right knee / lower leg pain      HISTORY:  Malgorzata Kerr is a 49 y.o. female who is seen for 2-day history right knee pain.  Her knee pain radiates to her right lateral lower leg. She states she felt a pop in her right knee and leg when she stood up from her desk a couple days ago. She was seen at [facility] ED on 4/10/24 where RLE venous vascular study was negative for DVT and xrays revealed no acute findings. She was prescribed Percocet, provided with a knee immobilizer, and referred to orthopedics. She has been ambulating with one crutch.     She was previously seen by Dr. Morales and CAT Petersen for left knee pain. She underwent left knee arthroscopy and partial lateral meniscectomy on 3/21/22 by Dr. Morales.  She also underwent left knee arthroscopy in 2014.  Her left leg is her weaker leg.     Occupation, etc: Ms. 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 Her job is mostly sedentary. She wears compression socks and gets up every 2 hours while working. She recently bought a treadmill but has been unable to use it due to her current knee problem. She lives with her  in [place]. She has 3 adult sons, 1 adult daughter, and a 6 yo grandson. She is needle-phobic. She is an insulin dependent diabetic. She weighs 230 lbs and is 5'6\". She has gained about 10 lbs recently. She was previously taking Ozempic but discontinued it due to negative side affects. She is going on a trip to [place], [place] next month for her grandson's birthday and would like to feel better before her trip.   Hemoglobin A1C   Date Value Ref Range Status   02/25/2023 9.2 (H) 4.2 - 5.6 % Final     Comment:     (NOTE)  HbA1C Interpretive

## 2024-04-23 ENCOUNTER — HOSPITAL ENCOUNTER (OUTPATIENT)
Facility: HOSPITAL | Age: 49
Setting detail: RECURRING SERIES
Discharge: HOME | End: 2024-04-26
Payer: COMMERCIAL

## 2024-04-23 PROCEDURE — 97535 SELF CARE MNGMENT TRAINING: CPT

## 2024-04-23 PROCEDURE — 97162 PT EVAL MOD COMPLEX 30 MIN: CPT

## 2024-04-23 PROCEDURE — 97110 THERAPEUTIC EXERCISES: CPT

## 2024-04-23 NOTE — PROGRESS NOTES
[facility]  [address] [facility] [address] [place] [address] Ph:[phone] Fx: [phone]    PLAN OF CARE/ Statement of Necessity for Physical Therapy Services           Patient name: Malgorzata Kerr Start of Care: 2024   Referral source: Karri Barbour MD : 1975    Medical Diagnosis: Right knee pain [M25.561]       Onset Date: 04/10/2024   Treatment Diagnosis: M25.561  RIGHT KNEE PAIN and M79.604  Pain in right leg                                      Prior Hospitalization: see medical history Provider#: [id number]   Medications: Verified on Patient Summary List     Comorbidities: Diabetes, DM type ll and hyperlipidemia.  Prior Level of Function: Community ambulator without any AD.    The Plan of Care and following information is based on the information from the initial evaluation.    Assessment / key information:  Pt is a 48 y/o female came for the physical therapy evaluation with a CC of  pain in the right lower leg> right knee pain. Per pt, on 04/10/2024, while performing the STS transfers, pt had a fall, pt reported falling on a  couch and also reported of hearing a snap. Additionally, pt reported that she walked longer distances prior to the injury and started feeling some pain at that time as well which got worse with a fall.  Pt's X-ray reports of right knee dated 04/10/2024 showed no fractures or dislocations. Due to this pain, pt reported difficulty waking longer distances without a cane, difficulty standing for a longer duration and ascending/descending stairs. Pt presents with impairments that includes pain, decreased ROM, knee instability, fear of falling,mild extension lag, decreased strength, impaired balance and gait. Due to these impairments, pt is unable to perform  the functional tasks  with ease and is affecting her ability to perform the ADLs. Pt's signs and symptoms are consistent with knee OA and peroneal muscle strain.

## 2024-04-23 NOTE — PROGRESS NOTES
PHYSICAL / OCCUPATIONAL THERAPY - DAILY TREATMENT NOTE     Patient Name: Malgorzata Kerr    Date: 2024    : 1975  Insurance: Payor: RADHA / Plan: KATIE GARCIA FEDERAL / Product Type: *No Product type* /      Patient  verified Yes     Visit #   Current / Total 1 24   Time   In / Out 8:34 9:10   Pain   In / Out 2 2   Subjective Functional Status/Changes: Pt reported 2/10 pain in the right leg today   Changes to:  Allergies, Med Hx, Sx Hx?   no       TREATMENT AREA =  Right knee pain [M25.561]    OBJECTIVE      Therapeutic Procedures:  Tx Min Billable or 1:1 Min (if diff from Tx Min) Procedure, Rationale, Specifics   10  53880 Therapeutic Exercise (timed):  increase ROM, strength, coordination, balance, and proprioception to improve patient's ability to progress to PLOF and address remaining functional goals. (see flow sheet as applicable)    Details if applicable:  HEP reviewed and demonstrated to the pt today, pt advised to perform the exercises in a pain free range only     8  [id number] Self Care/Home Management (timed):  improve patient knowledge and understanding of pain reducing techniques, activity modification, diagnosis/prognosis, and physical therapy expectations, procedures and progression  to improve patient's ability to progress to PLOF and address remaining functional goals.  (see flow sheet as applicable)    Details if applicable:     18  [facility] Totals Reminder: bill using total billable min of TIMED therapeutic procedures (example: do not include dry needle or estim unattended, both untimed codes, in totals to left)  8-22 min = 1 unit; 23-37 min = 2 units; 38-52 min = 3 units; 53-67 min = 4 units; 68-82 min = 5 units   Total Total     TOTAL TREATMENT TIME:        36     [x]  Patient Education billed concurrently with other procedures   [x] Review HEP    [] Progressed/Changed HEP, detail:    [] Other detail:       Objective Information/Functional Measures/Assessment    Pt is a 48 y/o female

## 2024-05-01 ENCOUNTER — HOSPITAL ENCOUNTER (OUTPATIENT)
Facility: HOSPITAL | Age: 49
Setting detail: RECURRING SERIES
Discharge: HOME | End: 2024-05-04
Payer: COMMERCIAL

## 2024-05-01 PROCEDURE — 97530 THERAPEUTIC ACTIVITIES: CPT

## 2024-05-01 PROCEDURE — 97110 THERAPEUTIC EXERCISES: CPT

## 2024-05-01 PROCEDURE — 97112 NEUROMUSCULAR REEDUCATION: CPT

## 2024-05-01 NOTE — PROGRESS NOTES
PHYSICAL / OCCUPATIONAL THERAPY - DAILY TREATMENT NOTE     Patient Name: Malgorzata Kerr    Date: 2024    : 1975  Insurance: Payor: RADHA / Plan: KATIE GARCIA FEDERAL / Product Type: *No Product type* /      Patient  verified Yes     Visit #   Current / Total 2 24   Time   In / Out 7:10 7:58   Pain   In / Out 3-4 0   Subjective Functional Status/Changes: Pt reports minimal pain this morning.  Pt reports no change since IE.Pt states her lower leg is more painful than her knee.     Changes to:  Allergies, Med Hx, Sx Hx?   no       TREATMENT AREA =  Right knee pain [M25.561]    OBJECTIVE    Modalities Rationale:     decrease inflammation and decrease pain to improve patient's ability to progress to PLOF and address remaining functional goals.     min [] Estim Unattended, type/location:                                      []  w/ice    []  w/heat    min [] Estim Attended, type/location:                                     []  w/US     []  w/ice    []  w/heat    []  TENS insruct      min []  Mechanical Traction: type/lbs                   []  pro   []  sup   []  int   []  cont    []  before manual    []  after manual    min []  Ultrasound, settings/location:      min []  Iontophoresis w/ dexamethasone, location:                                               []  take home patch       []  in clinic     10   min  unbilled [x]  Ice     []  Heat    location/position: Right knee and shin/ long sitting    min []  Paraffin,  details:     min []  Vasopneumatic Device, press/temp:     min []  Whirlpool / Fluido:    If using vaso (only need to measure limb vaso being performed on)      pre-treatment girth :       post-treatment girth :       measured at (landmark location) :      min []  Other:    Skin assessment post-treatment (if applicable):    []  intact    []  redness- no adverse reaction                 []redness - adverse reaction:         Therapeutic Procedures:  Tx Min Billable or 1:1 Min (if diff from Tx

## 2024-05-08 ENCOUNTER — APPOINTMENT (OUTPATIENT)
Facility: HOSPITAL | Age: 49
End: 2024-05-08
Payer: COMMERCIAL

## 2024-05-08 ENCOUNTER — TELEPHONE (OUTPATIENT)
Facility: HOSPITAL | Age: 49
End: 2024-05-08

## 2024-05-13 ENCOUNTER — APPOINTMENT (OUTPATIENT)
Facility: HOSPITAL | Age: 49
End: 2024-05-13
Payer: COMMERCIAL

## 2024-05-20 ENCOUNTER — APPOINTMENT (OUTPATIENT)
Facility: HOSPITAL | Age: 49
End: 2024-05-20
Payer: COMMERCIAL

## 2024-05-22 ENCOUNTER — APPOINTMENT (OUTPATIENT)
Facility: HOSPITAL | Age: 49
End: 2024-05-22
Payer: COMMERCIAL

## 2024-05-23 ENCOUNTER — APPOINTMENT (OUTPATIENT)
Facility: HOSPITAL | Age: 49
End: 2024-05-23
Payer: COMMERCIAL

## 2024-05-29 ENCOUNTER — APPOINTMENT (OUTPATIENT)
Facility: HOSPITAL | Age: 49
End: 2024-05-29
Payer: COMMERCIAL

## 2024-05-31 ENCOUNTER — APPOINTMENT (OUTPATIENT)
Facility: HOSPITAL | Age: 49
End: 2024-05-31
Payer: COMMERCIAL

## 2024-06-28 ENCOUNTER — TELEPHONE (OUTPATIENT)
Facility: CLINIC | Age: 49
End: 2024-06-28

## 2024-06-28 NOTE — TELEPHONE ENCOUNTER
Last appointment: 05/15/2023    Next appointment: none    Pharmacy requesting refill for     dapagliflozin (FARXIGA) 10 MG tablet [[id number]]    Pharmacy   [id number] - [place], [place] - [address] [place] [address] - P [phone] - F [phone] (Pharmacy)

## 2024-07-01 NOTE — TELEPHONE ENCOUNTER
Last Appointment- 11/17/22    Next Appointment- None    Previous Refill Encounter(s): Date: 6/27/23 #90 Refills 3     Requested Prescriptions     Pending Prescriptions Disp Refills    dapagliflozin (FARXIGA) 10 MG tablet 90 tablet 3     Sig: Take 1 tablet by mouth daily

## 2024-07-03 RX ORDER — DAPAGLIFLOZIN 10 MG/1
10 TABLET, FILM COATED ORAL DAILY
Qty: 90 TABLET | Refills: 3 | Status: SHIPPED | OUTPATIENT
Start: 2024-07-03

## 2024-07-15 NOTE — TELEPHONE ENCOUNTER
Last Appointment- 5/15/23    Next Appointment- None    Previous Refill Encounter(s): Date: 6/29/23 #2 Refills 11    Requested Prescriptions     Pending Prescriptions Disp Refills    Continuous Glucose Sensor (FREESTYLE EVA 2 SENSOR) MISC 2 each 11     Sig: USE AS DIRECTED TO CHECK BLOOD SUGARS AT LEAST 3 TIMES DAILY.

## 2024-07-22 DIAGNOSIS — E55.9 VITAMIN D DEFICIENCY, UNSPECIFIED: ICD-10-CM

## 2024-07-24 NOTE — TELEPHONE ENCOUNTER
Amlodipine is a duplicate request    Last Appointment- 11/18/22    Next Appointment- None    Previous Refill Encounter(s): Date: 4/10/24 #12 Refills 3    Requested Prescriptions     Pending Prescriptions Disp Refills    amLODIPine (NORVASC) 10 MG tablet 90 tablet 3     Sig: Take 1 tablet by mouth daily    vitamin D (ERGOCALCIFEROL) 1.25 MG (24866 UT) CAPS capsule 12 capsule 3     Sig: TAKE 1 CAPSULE BY MOUTH 1 TIME EVERY WEEK

## 2024-07-29 RX ORDER — ERGOCALCIFEROL 1.25 MG/1
CAPSULE ORAL
Qty: 12 CAPSULE | Refills: 3 | Status: SHIPPED | OUTPATIENT
Start: 2024-07-29

## 2024-07-29 RX ORDER — AMLODIPINE BESYLATE 10 MG/1
10 TABLET ORAL DAILY
Qty: 90 TABLET | Refills: 3 | OUTPATIENT
Start: 2024-07-29

## 2024-07-29 RX ORDER — AMLODIPINE BESYLATE 10 MG/1
10 TABLET ORAL DAILY
Qty: 90 TABLET | Refills: 3 | Status: SHIPPED | OUTPATIENT
Start: 2024-07-29

## 2024-08-01 ENCOUNTER — OFFICE VISIT (OUTPATIENT)
Facility: CLINIC | Age: 49
End: 2024-08-01
Payer: COMMERCIAL

## 2024-08-01 ENCOUNTER — HOSPITAL ENCOUNTER (OUTPATIENT)
Facility: HOSPITAL | Age: 49
Setting detail: SPECIMEN
Discharge: HOME | End: 2024-08-01
Payer: COMMERCIAL

## 2024-08-01 VITALS
HEART RATE: 79 BPM | BODY MASS INDEX: 37.77 KG/M2 | HEIGHT: 66 IN | WEIGHT: 235 LBS | SYSTOLIC BLOOD PRESSURE: 137 MMHG | TEMPERATURE: 98.1 F | DIASTOLIC BLOOD PRESSURE: 70 MMHG | OXYGEN SATURATION: 96 % | RESPIRATION RATE: 18 BRPM

## 2024-08-01 DIAGNOSIS — Z12.11 SCREENING FOR COLORECTAL CANCER: ICD-10-CM

## 2024-08-01 DIAGNOSIS — N95.1 MENOPAUSAL SYMPTOMS: ICD-10-CM

## 2024-08-01 DIAGNOSIS — E11.65 TYPE 2 DIABETES MELLITUS WITH HYPERGLYCEMIA, WITH LONG-TERM CURRENT USE OF INSULIN (HCC): Primary | ICD-10-CM

## 2024-08-01 DIAGNOSIS — E11.65 TYPE 2 DIABETES MELLITUS WITH HYPERGLYCEMIA, WITH LONG-TERM CURRENT USE OF INSULIN (HCC): ICD-10-CM

## 2024-08-01 DIAGNOSIS — Z79.4 TYPE 2 DIABETES MELLITUS WITH HYPERGLYCEMIA, WITH LONG-TERM CURRENT USE OF INSULIN (HCC): ICD-10-CM

## 2024-08-01 DIAGNOSIS — Z79.4 TYPE 2 DIABETES MELLITUS WITH HYPERGLYCEMIA, WITH LONG-TERM CURRENT USE OF INSULIN (HCC): Primary | ICD-10-CM

## 2024-08-01 DIAGNOSIS — Z12.12 SCREENING FOR COLORECTAL CANCER: ICD-10-CM

## 2024-08-01 DIAGNOSIS — L84 CALLUS OF FOOT: ICD-10-CM

## 2024-08-01 LAB
ALBUMIN SERPL-MCNC: 3.4 G/DL (ref 3.4–5)
ALBUMIN/GLOB SERPL: 0.8 (ref 0.8–1.7)
ALP SERPL-CCNC: 59 U/L (ref 45–117)
ALT SERPL-CCNC: 14 U/L (ref 13–56)
ANION GAP SERPL CALC-SCNC: 6 MMOL/L (ref 3–18)
AST SERPL-CCNC: 7 U/L (ref 10–38)
BILIRUB SERPL-MCNC: 0.4 MG/DL (ref 0.2–1)
BUN SERPL-MCNC: 26 MG/DL (ref 7–18)
BUN/CREAT SERPL: 24 (ref 12–20)
CALCIUM SERPL-MCNC: 9.6 MG/DL (ref 8.5–10.1)
CHLORIDE SERPL-SCNC: 109 MMOL/L (ref 100–111)
CHOLEST SERPL-MCNC: 237 MG/DL
CO2 SERPL-SCNC: 24 MMOL/L (ref 21–32)
CREAT SERPL-MCNC: 1.07 MG/DL (ref 0.6–1.3)
CREAT UR-MCNC: 65 MG/DL (ref 30–125)
EST. AVERAGE GLUCOSE BLD GHB EST-MCNC: 217 MG/DL
GLOBULIN SER CALC-MCNC: 4.1 G/DL (ref 2–4)
GLUCOSE SERPL-MCNC: 136 MG/DL (ref 74–99)
HBA1C MFR BLD: 9.2 % (ref 4.2–5.6)
HDLC SERPL-MCNC: 38 MG/DL (ref 40–60)
HDLC SERPL: 6.2 (ref 0–5)
LDLC SERPL CALC-MCNC: 166 MG/DL (ref 0–100)
LIPID PANEL: ABNORMAL
MICROALBUMIN UR-MCNC: 90.2 MG/DL (ref 0–3)
MICROALBUMIN/CREAT UR-RTO: 1388 MG/G (ref 0–30)
POTASSIUM SERPL-SCNC: 3.8 MMOL/L (ref 3.5–5.5)
PROT SERPL-MCNC: 7.5 G/DL (ref 6.4–8.2)
SODIUM SERPL-SCNC: 139 MMOL/L (ref 136–145)
TRIGL SERPL-MCNC: 165 MG/DL
VLDLC SERPL CALC-MCNC: 33 MG/DL

## 2024-08-01 PROCEDURE — 83036 HEMOGLOBIN GLYCOSYLATED A1C: CPT

## 2024-08-01 PROCEDURE — 36415 COLL VENOUS BLD VENIPUNCTURE: CPT

## 2024-08-01 PROCEDURE — 80061 LIPID PANEL: CPT

## 2024-08-01 PROCEDURE — 82570 ASSAY OF URINE CREATININE: CPT

## 2024-08-01 PROCEDURE — 80053 COMPREHEN METABOLIC PANEL: CPT

## 2024-08-01 PROCEDURE — 82043 UR ALBUMIN QUANTITATIVE: CPT

## 2024-08-01 PROCEDURE — 3046F HEMOGLOBIN A1C LEVEL >9.0%: CPT | Performed by: NURSE PRACTITIONER

## 2024-08-01 PROCEDURE — 99214 OFFICE O/P EST MOD 30 MIN: CPT | Performed by: NURSE PRACTITIONER

## 2024-08-01 RX ORDER — BLOOD-GLUCOSE SENSOR
EACH MISCELLANEOUS
Qty: 2 EACH | Refills: 5 | Status: SHIPPED | OUTPATIENT
Start: 2024-08-01

## 2024-08-01 SDOH — ECONOMIC STABILITY: FOOD INSECURITY: WITHIN THE PAST 12 MONTHS, THE FOOD YOU BOUGHT JUST DIDN'T LAST AND YOU DIDN'T HAVE MONEY TO GET MORE.: NEVER TRUE

## 2024-08-01 SDOH — ECONOMIC STABILITY: INCOME INSECURITY: HOW HARD IS IT FOR YOU TO PAY FOR THE VERY BASICS LIKE FOOD, HOUSING, MEDICAL CARE, AND HEATING?: NOT HARD AT ALL

## 2024-08-01 SDOH — ECONOMIC STABILITY: FOOD INSECURITY: WITHIN THE PAST 12 MONTHS, YOU WORRIED THAT YOUR FOOD WOULD RUN OUT BEFORE YOU GOT MONEY TO BUY MORE.: NEVER TRUE

## 2024-08-01 SDOH — ECONOMIC STABILITY: TRANSPORTATION INSECURITY
IN THE PAST 12 MONTHS, HAS LACK OF TRANSPORTATION KEPT YOU FROM MEETINGS, WORK, OR FROM GETTING THINGS NEEDED FOR DAILY LIVING?: NO

## 2024-08-01 ASSESSMENT — PATIENT HEALTH QUESTIONNAIRE - PHQ9
SUM OF ALL RESPONSES TO PHQ QUESTIONS 1-9: 0
1. LITTLE INTEREST OR PLEASURE IN DOING THINGS: NOT AT ALL
SUM OF ALL RESPONSES TO PHQ QUESTIONS 1-9: 0
SUM OF ALL RESPONSES TO PHQ9 QUESTIONS 1 & 2: 0
SUM OF ALL RESPONSES TO PHQ QUESTIONS 1-9: 0
2. FEELING DOWN, DEPRESSED OR HOPELESS: NOT AT ALL
SUM OF ALL RESPONSES TO PHQ QUESTIONS 1-9: 0

## 2024-08-01 NOTE — PROGRESS NOTES
\"Have you been to the ER, urgent care clinic since your last visit?  Hospitalized since your last visit?\"    NO    “Have you seen or consulted any other health care providers outside of [facility] since your last visit?”    NO        “Have you had a colorectal cancer screening such as a colonoscopy/FIT/Cologuard?    NO    No colonoscopy on file  No cologuard on file  No FIT/FOBT on file   No flexible sigmoidoscopy on file         Click Here for Release of Records Request

## 2024-08-01 NOTE — PROGRESS NOTES
0.5 (L) 09/29/2021    GLOB 4.4 (H) 09/29/2021     Lab Results   Component Value Date    CHOL 289 (H) 02/25/2023    TRIG 191 (H) 02/25/2023    HDL 48 02/25/2023    .8 (H) 02/25/2023    VLDL 38.2 02/25/2023    CHOLHDLRATIO 6.0 (H) 02/25/2023     Hemoglobin A1C   Date Value Ref Range Status   02/25/2023 9.2 (H) 4.2 - 5.6 % Final     Comment:     (NOTE)  HbA1C Interpretive Ranges  <5.7              Normal  5.7 - 6.4         Consider Prediabetes  >6.5              Consider Diabetes       Hemoglobin A1C, POC   Date Value Ref Range Status   03/15/2022 7.8 % Final      Lab Results   Component Value Date    MALBCR 3328 (H) 02/25/2023       BP Readings from Last 3 Encounters:   08/01/24 137/70   04/10/24 128/65   03/16/22 138/88       Wt Readings from Last 3 Encounters:   08/01/24 106.6 kg (235 lb)   04/12/24 104.3 kg (230 lb)   04/10/24 104.3 kg (230 lb)       No results found for this visit on 08/01/24.    Last Diabetic Foot/Eye Exam on:   Diabetic Foot Exam HM Status   Topic Date Due    Diabetic Foot Exam  07/02/2020     Diabetes Management   Topic Date Due    Diabetic retinal exam  02/27/2020    Diabetic foot exam  07/02/2020    A1C test (Diabetic or Prediabetic)  02/25/2024    Diabetic Alb to Cr ratio (uACR) test  02/25/2024    Lipids  02/25/2024       Objective:  /70 (Site: Left Lower Arm, Position: Sitting, Cuff Size: Small Adult)   Pulse 79   Temp 98.1 °F (36.7 °C) (Temporal)   Resp 18   Ht 1.676 m (5' 6\")   Wt 106.6 kg (235 lb)   SpO2 96%   BMI 37.93 kg/m²   Awake and alert in no acute distress   Neck supple without lymphadenopathy, no carotid artery bruits auscultated bilaterally.   No thyromegaly   Lungs clear throughout   S1 S2 RRR without ectopy or murmur auscultated.   Extremities: no clubbing, cyanosis, peripheral edema   Abdomen - soft, nontender, nondistended, no masses or organomegaly  Integumentary: no rashes     Reviewed vital signs     Diabetic foot exam: deferred, requesting referral  to podiatry    Disclaimer:    The patient understands our medical plan.  Alternatives have been explained and offered.  The risks, benefits and significant side effects of all medications have been reviewed. Anticipated time course and progression of condition reviewed. All questions have been addressed.  She is encouraged to employ the information provided in the after visit summary, which was reviewed.      Where applicable, she is instructed to call the clinic if she has not been notified either by phone or through MyChart with the results of her tests or with an appointment plan for any referrals within 1 week(s).  No news is not good news; it's no news. The patient  is to call if her condition worsens or fails to improve or if significant side effects are experienced.     Aspects of this note may have been generated using voice recognition software. Despite editing, there may be unrecognized errors.   The patient (or guardian, if applicable) and other individuals in attendance with the patient were advised that Artificial Intelligence will be utilized during this visit to record and process the conversation to generate a clinical note. The patient (or guardian, if applicable) and other individuals in attendance at the appointment consented to the use of AI, including the recording.      JACKIE Toro NP   An electronic signature was used to authenticate this note.

## 2024-08-10 ENCOUNTER — HOSPITAL ENCOUNTER (EMERGENCY)
Facility: HOSPITAL | Age: 49
Discharge: HOME | End: 2024-08-10
Attending: EMERGENCY MEDICINE
Payer: COMMERCIAL

## 2024-08-10 ENCOUNTER — APPOINTMENT (OUTPATIENT)
Facility: HOSPITAL | Age: 49
End: 2024-08-10
Payer: COMMERCIAL

## 2024-08-10 VITALS
TEMPERATURE: 98.2 F | HEIGHT: 66 IN | HEART RATE: 81 BPM | SYSTOLIC BLOOD PRESSURE: 142 MMHG | RESPIRATION RATE: 18 BRPM | OXYGEN SATURATION: 96 % | DIASTOLIC BLOOD PRESSURE: 82 MMHG | BODY MASS INDEX: 36.96 KG/M2 | WEIGHT: 230 LBS

## 2024-08-10 DIAGNOSIS — R07.9 CHEST PAIN, UNSPECIFIED TYPE: Primary | ICD-10-CM

## 2024-08-10 LAB
ALBUMIN SERPL-MCNC: 3.4 G/DL (ref 3.4–5)
ALBUMIN/GLOB SERPL: 0.7 (ref 0.8–1.7)
ALP SERPL-CCNC: 73 U/L (ref 45–117)
ALT SERPL-CCNC: 16 U/L (ref 13–56)
ANION GAP SERPL CALC-SCNC: 8 MMOL/L (ref 3–18)
AST SERPL-CCNC: 11 U/L (ref 10–38)
BASOPHILS # BLD: 0 K/UL (ref 0–0.1)
BASOPHILS NFR BLD: 0 % (ref 0–2)
BILIRUB SERPL-MCNC: 0.4 MG/DL (ref 0.2–1)
BUN SERPL-MCNC: 27 MG/DL (ref 7–18)
BUN/CREAT SERPL: 26 (ref 12–20)
CALCIUM SERPL-MCNC: 9.7 MG/DL (ref 8.5–10.1)
CHLORIDE SERPL-SCNC: 106 MMOL/L (ref 100–111)
CO2 SERPL-SCNC: 24 MMOL/L (ref 21–32)
CREAT SERPL-MCNC: 1.05 MG/DL (ref 0.6–1.3)
DIFFERENTIAL METHOD BLD: ABNORMAL
EKG ATRIAL RATE: 82 BPM
EKG DIAGNOSIS: NORMAL
EKG P AXIS: 37 DEGREES
EKG P-R INTERVAL: 166 MS
EKG Q-T INTERVAL: 384 MS
EKG QRS DURATION: 88 MS
EKG QTC CALCULATION (BAZETT): 448 MS
EKG R AXIS: -15 DEGREES
EKG T AXIS: 3 DEGREES
EKG VENTRICULAR RATE: 82 BPM
EOSINOPHIL # BLD: 0.1 K/UL (ref 0–0.4)
EOSINOPHIL NFR BLD: 1 % (ref 0–5)
ERYTHROCYTE [DISTWIDTH] IN BLOOD BY AUTOMATED COUNT: 14.9 % (ref 11.6–14.5)
GLOBULIN SER CALC-MCNC: 5.1 G/DL (ref 2–4)
GLUCOSE SERPL-MCNC: 168 MG/DL (ref 74–99)
HCT VFR BLD AUTO: 40.7 % (ref 35–45)
HGB BLD-MCNC: 12.7 G/DL (ref 12–16)
IMM GRANULOCYTES # BLD AUTO: 0.1 K/UL (ref 0–0.04)
IMM GRANULOCYTES NFR BLD AUTO: 1 % (ref 0–0.5)
LYMPHOCYTES # BLD: 3 K/UL (ref 0.9–3.6)
LYMPHOCYTES NFR BLD: 28 % (ref 21–52)
MCH RBC QN AUTO: 24.4 PG (ref 24–34)
MCHC RBC AUTO-ENTMCNC: 31.2 G/DL (ref 31–37)
MCV RBC AUTO: 78.1 FL (ref 78–100)
MONOCYTES # BLD: 0.7 K/UL (ref 0.05–1.2)
MONOCYTES NFR BLD: 6 % (ref 3–10)
NEUTS SEG # BLD: 7 K/UL (ref 1.8–8)
NEUTS SEG NFR BLD: 65 % (ref 40–73)
NRBC # BLD: 0 K/UL (ref 0–0.01)
NRBC BLD-RTO: 0 PER 100 WBC
PLATELET # BLD AUTO: 342 K/UL (ref 135–420)
PMV BLD AUTO: 9.2 FL (ref 9.2–11.8)
POTASSIUM SERPL-SCNC: 3.8 MMOL/L (ref 3.5–5.5)
PROT SERPL-MCNC: 8.5 G/DL (ref 6.4–8.2)
RBC # BLD AUTO: 5.21 M/UL (ref 4.2–5.3)
SODIUM SERPL-SCNC: 138 MMOL/L (ref 136–145)
TROPONIN I SERPL HS-MCNC: 7 NG/L (ref 0–54)
TROPONIN I SERPL HS-MCNC: 7 NG/L (ref 0–54)
WBC # BLD AUTO: 10.8 K/UL (ref 4.6–13.2)

## 2024-08-10 PROCEDURE — 93010 ELECTROCARDIOGRAM REPORT: CPT | Performed by: INTERNAL MEDICINE

## 2024-08-10 PROCEDURE — 84484 ASSAY OF TROPONIN QUANT: CPT

## 2024-08-10 PROCEDURE — 93005 ELECTROCARDIOGRAM TRACING: CPT | Performed by: EMERGENCY MEDICINE

## 2024-08-10 PROCEDURE — 99285 EMERGENCY DEPT VISIT HI MDM: CPT

## 2024-08-10 PROCEDURE — 80053 COMPREHEN METABOLIC PANEL: CPT

## 2024-08-10 PROCEDURE — 71045 X-RAY EXAM CHEST 1 VIEW: CPT

## 2024-08-10 PROCEDURE — 85025 COMPLETE CBC W/AUTO DIFF WBC: CPT

## 2024-08-10 RX ORDER — TRAMADOL HYDROCHLORIDE 50 MG/1
50 TABLET ORAL EVERY 6 HOURS PRN
Qty: 12 TABLET | Refills: 0 | Status: SHIPPED | OUTPATIENT
Start: 2024-08-10 | End: 2024-08-13

## 2024-08-10 ASSESSMENT — PAIN - FUNCTIONAL ASSESSMENT: PAIN_FUNCTIONAL_ASSESSMENT: 0-10

## 2024-08-10 ASSESSMENT — PAIN SCALES - GENERAL: PAINLEVEL_OUTOF10: 7

## 2024-08-10 ASSESSMENT — ENCOUNTER SYMPTOMS
RESPIRATORY NEGATIVE: 1
GASTROINTESTINAL NEGATIVE: 1
SHORTNESS OF BREATH: 0

## 2024-08-10 ASSESSMENT — PAIN DESCRIPTION - LOCATION: LOCATION: CHEST

## 2024-08-10 NOTE — ED NOTES
Pt ambulatory to room with steady gait, NAD and A&O x 4. Pt reports intermittent chest pain that began Friday AM. Pt reports that pain has worsened through out day and radiates intermittently to shoulder.  at bedside.      Past Medical History:   Diagnosis Date    ADHD (attention deficit hyperactivity disorder)     Diabetes ([facility])     Diabetes mellitus, type II ([facility])     DVT (deep venous thrombosis) ([facility]) 05/05/2015    Left leg:  No DVT.    History of echocardiogram 09/02/2015    EF 60-65%.  No RWMA.  Mild conc LVH.  No significant valvular heart disease.      History of uterine fibroid     Holter monitor, abnormal 09/02/2015    Sinus rhythm, avg HR 99 bpm (range  bpm).  Benign Holter study.    Hyperlipidemia     Polycystic kidney disease     Psychiatric disorder

## 2024-08-10 NOTE — ED PROVIDER NOTES
Socioeconomic History    Marital status:      Spouse name: None    Number of children: None    Years of education: None    Highest education level: None   Tobacco Use    Smoking status: Former     Current packs/day: 0.00     Types: Cigarettes     Quit date: 2000     Years since quittin.6    Smokeless tobacco: Never   Vaping Use    Vaping status: Never Used   Substance and Sexual Activity    Alcohol use: Yes    Drug use: No   Social History Narrative    ** Merged History Encounter **          Social Determinants of Health     Financial Resource Strain: Low Risk  (2024)    Overall Financial Resource Strain (CARDIA)     Difficulty of Paying Living Expenses: Not hard at all   Food Insecurity: No Food Insecurity (2024)    Hunger Vital Sign     Worried About Running Out of Food in the Last Year: Never true     Ran Out of Food in the Last Year: Never true   Transportation Needs: No Transportation Needs (2024)    PRAPARE - Transportation     Lack of Transportation (Medical): No     Lack of Transportation (Non-Medical): No   Housing Stability: Low Risk  (2024)    Housing Stability Vital Sign     Unable to Pay for Housing in the Last Year: No     Number of Places Lived in the Last Year: 1     Unstable Housing in the Last Year: No         PHYSICAL EXAM       ED Triage Vitals [08/10/24 0049]   BP Systolic BP Percentile Diastolic BP Percentile Temp Temp Source Pulse Respirations SpO2   139/86 -- -- 98.2 °F (36.8 °C) Oral 81 18 99 %      Height Weight - Scale         1.676 m (5' 6\") 104.3 kg (230 lb)             Physical Exam  Constitutional:       Appearance: Normal appearance.   HENT:      Head: Normocephalic and atraumatic.      Nose: Nose normal.   Eyes:      Extraocular Movements: Extraocular movements intact.      Pupils: Pupils are equal, round, and reactive to light.   Cardiovascular:      Rate and Rhythm: Normal rate and regular rhythm.   Pulmonary:      Effort: Pulmonary effort is  to 3 days.  Return to Er prn/        (Please note that portions of this note were completed with a voice recognitionprogram.  Efforts were made to edit the dictations but occasionally words are mis-transcribed.)    Sim Omer MD(electronically signed)  Attending Emergency Physician